# Patient Record
Sex: MALE | Race: WHITE | Employment: FULL TIME
[De-identification: names, ages, dates, MRNs, and addresses within clinical notes are randomized per-mention and may not be internally consistent; named-entity substitution may affect disease eponyms.]

---

## 2022-06-20 ENCOUNTER — NURSE TRIAGE (OUTPATIENT)
Dept: OTHER | Facility: CLINIC | Age: 57
End: 2022-06-20

## 2022-09-20 SDOH — HEALTH STABILITY: PHYSICAL HEALTH: ON AVERAGE, HOW MANY MINUTES DO YOU ENGAGE IN EXERCISE AT THIS LEVEL?: 60 MIN

## 2022-09-20 SDOH — HEALTH STABILITY: PHYSICAL HEALTH: ON AVERAGE, HOW MANY DAYS PER WEEK DO YOU ENGAGE IN MODERATE TO STRENUOUS EXERCISE (LIKE A BRISK WALK)?: 4 DAYS

## 2022-09-21 ENCOUNTER — OFFICE VISIT (OUTPATIENT)
Dept: FAMILY MEDICINE CLINIC | Age: 57
End: 2022-09-21
Payer: COMMERCIAL

## 2022-09-21 VITALS
RESPIRATION RATE: 16 BRPM | TEMPERATURE: 97.9 F | OXYGEN SATURATION: 99 % | HEIGHT: 68 IN | DIASTOLIC BLOOD PRESSURE: 88 MMHG | BODY MASS INDEX: 25.85 KG/M2 | WEIGHT: 170.6 LBS | HEART RATE: 63 BPM | SYSTOLIC BLOOD PRESSURE: 136 MMHG

## 2022-09-21 DIAGNOSIS — Z12.5 SCREENING PSA (PROSTATE SPECIFIC ANTIGEN): ICD-10-CM

## 2022-09-21 DIAGNOSIS — Z13.228 SCREENING FOR ENDOCRINE, METABOLIC AND IMMUNITY DISORDER: ICD-10-CM

## 2022-09-21 DIAGNOSIS — Z13.29 SCREENING FOR ENDOCRINE, METABOLIC AND IMMUNITY DISORDER: ICD-10-CM

## 2022-09-21 DIAGNOSIS — Z13.1 SCREENING FOR DIABETES MELLITUS: ICD-10-CM

## 2022-09-21 DIAGNOSIS — M54.50 ACUTE EXACERBATION OF CHRONIC LOW BACK PAIN: Primary | ICD-10-CM

## 2022-09-21 DIAGNOSIS — Z13.220 ENCOUNTER FOR SCREENING FOR LIPOID DISORDERS: ICD-10-CM

## 2022-09-21 DIAGNOSIS — Z13.0 SCREENING FOR ENDOCRINE, METABOLIC AND IMMUNITY DISORDER: ICD-10-CM

## 2022-09-21 DIAGNOSIS — Z71.82 EXERCISE COUNSELING: ICD-10-CM

## 2022-09-21 DIAGNOSIS — Z76.89 ESTABLISHING CARE WITH NEW DOCTOR, ENCOUNTER FOR: ICD-10-CM

## 2022-09-21 DIAGNOSIS — Z12.11 SCREEN FOR COLON CANCER: ICD-10-CM

## 2022-09-21 DIAGNOSIS — S39.012S STRAIN OF MUSCLE, FASCIA AND TENDON OF LOWER BACK, SEQUELA: ICD-10-CM

## 2022-09-21 DIAGNOSIS — Z71.3 DIETARY COUNSELING: ICD-10-CM

## 2022-09-21 DIAGNOSIS — M62.830 LUMBAR PARASPINAL MUSCLE SPASM: ICD-10-CM

## 2022-09-21 DIAGNOSIS — Z23 NEED FOR SHINGLES VACCINE: ICD-10-CM

## 2022-09-21 DIAGNOSIS — G89.29 ACUTE EXACERBATION OF CHRONIC LOW BACK PAIN: Primary | ICD-10-CM

## 2022-09-21 LAB
ALBUMIN SERPL-MCNC: 4.7 G/DL (ref 3.5–5.2)
ALP BLD-CCNC: 69 U/L (ref 40–129)
ALT SERPL-CCNC: 18 U/L (ref 0–40)
ANION GAP SERPL CALCULATED.3IONS-SCNC: 10 MMOL/L (ref 7–16)
AST SERPL-CCNC: 19 U/L (ref 0–39)
BASOPHILS ABSOLUTE: 0.06 E9/L (ref 0–0.2)
BASOPHILS RELATIVE PERCENT: 1.2 % (ref 0–2)
BILIRUB SERPL-MCNC: 0.5 MG/DL (ref 0–1.2)
BUN BLDV-MCNC: 15 MG/DL (ref 6–20)
CALCIUM SERPL-MCNC: 9.5 MG/DL (ref 8.6–10.2)
CHLORIDE BLD-SCNC: 104 MMOL/L (ref 98–107)
CHOLESTEROL, FASTING: 240 MG/DL (ref 0–199)
CO2: 26 MMOL/L (ref 22–29)
CREAT SERPL-MCNC: 1.1 MG/DL (ref 0.7–1.2)
EOSINOPHILS ABSOLUTE: 0.06 E9/L (ref 0.05–0.5)
EOSINOPHILS RELATIVE PERCENT: 1.2 % (ref 0–6)
GFR AFRICAN AMERICAN: >60
GFR NON-AFRICAN AMERICAN: >60 ML/MIN/1.73
GLUCOSE BLD-MCNC: 95 MG/DL (ref 74–99)
HBA1C MFR BLD: 5.3 % (ref 4–5.6)
HCT VFR BLD CALC: 42 % (ref 37–54)
HDLC SERPL-MCNC: 39 MG/DL
HEMOGLOBIN: 14 G/DL (ref 12.5–16.5)
IMMATURE GRANULOCYTES #: 0.02 E9/L
IMMATURE GRANULOCYTES %: 0.4 % (ref 0–5)
LDL CHOLESTEROL CALCULATED: 176 MG/DL (ref 0–99)
LYMPHOCYTES ABSOLUTE: 1.7 E9/L (ref 1.5–4)
LYMPHOCYTES RELATIVE PERCENT: 33.3 % (ref 20–42)
MCH RBC QN AUTO: 29.1 PG (ref 26–35)
MCHC RBC AUTO-ENTMCNC: 33.3 % (ref 32–34.5)
MCV RBC AUTO: 87.3 FL (ref 80–99.9)
MONOCYTES ABSOLUTE: 0.34 E9/L (ref 0.1–0.95)
MONOCYTES RELATIVE PERCENT: 6.7 % (ref 2–12)
NEUTROPHILS ABSOLUTE: 2.93 E9/L (ref 1.8–7.3)
NEUTROPHILS RELATIVE PERCENT: 57.2 % (ref 43–80)
PDW BLD-RTO: 12.8 FL (ref 11.5–15)
PLATELET # BLD: 190 E9/L (ref 130–450)
PMV BLD AUTO: 12.5 FL (ref 7–12)
POTASSIUM SERPL-SCNC: 4.2 MMOL/L (ref 3.5–5)
PROSTATE SPECIFIC ANTIGEN: 0.71 NG/ML (ref 0–4)
RBC # BLD: 4.81 E12/L (ref 3.8–5.8)
SODIUM BLD-SCNC: 140 MMOL/L (ref 132–146)
TOTAL PROTEIN: 6.9 G/DL (ref 6.4–8.3)
TRIGLYCERIDE, FASTING: 125 MG/DL (ref 0–149)
VLDLC SERPL CALC-MCNC: 25 MG/DL
WBC # BLD: 5.1 E9/L (ref 4.5–11.5)

## 2022-09-21 PROCEDURE — 99204 OFFICE O/P NEW MOD 45 MIN: CPT | Performed by: SURGERY

## 2022-09-21 PROCEDURE — 90715 TDAP VACCINE 7 YRS/> IM: CPT | Performed by: SURGERY

## 2022-09-21 PROCEDURE — 90471 IMMUNIZATION ADMIN: CPT | Performed by: SURGERY

## 2022-09-21 RX ORDER — ZOSTER VACCINE RECOMBINANT, ADJUVANTED 50 MCG/0.5
0.5 KIT INTRAMUSCULAR SEE ADMIN INSTRUCTIONS
Qty: 0.5 ML | Refills: 1 | Status: SHIPPED | OUTPATIENT
Start: 2022-09-21 | End: 2023-03-20

## 2022-09-21 RX ORDER — METHOCARBAMOL 500 MG/1
500 TABLET, FILM COATED ORAL 4 TIMES DAILY
Qty: 40 TABLET | Refills: 0 | Status: SHIPPED | OUTPATIENT
Start: 2022-09-21 | End: 2022-10-01

## 2022-09-21 RX ORDER — FINASTERIDE 1 MG/1
TABLET, FILM COATED ORAL
COMMUNITY
Start: 2022-07-29

## 2022-09-21 RX ORDER — IBUPROFEN 800 MG/1
800 TABLET ORAL EVERY 12 HOURS PRN
Qty: 90 TABLET | Refills: 0 | Status: SHIPPED | OUTPATIENT
Start: 2022-09-21

## 2022-09-21 RX ORDER — ACETAMINOPHEN 500 MG
500 TABLET ORAL 3 TIMES DAILY PRN
Qty: 180 TABLET | Refills: 0 | Status: SHIPPED | OUTPATIENT
Start: 2022-09-21

## 2022-09-21 SDOH — ECONOMIC STABILITY: FOOD INSECURITY: WITHIN THE PAST 12 MONTHS, THE FOOD YOU BOUGHT JUST DIDN'T LAST AND YOU DIDN'T HAVE MONEY TO GET MORE.: NEVER TRUE

## 2022-09-21 SDOH — ECONOMIC STABILITY: FOOD INSECURITY: WITHIN THE PAST 12 MONTHS, YOU WORRIED THAT YOUR FOOD WOULD RUN OUT BEFORE YOU GOT MONEY TO BUY MORE.: NEVER TRUE

## 2022-09-21 ASSESSMENT — PATIENT HEALTH QUESTIONNAIRE - PHQ9
SUM OF ALL RESPONSES TO PHQ QUESTIONS 1-9: 0
SUM OF ALL RESPONSES TO PHQ9 QUESTIONS 1 & 2: 0
SUM OF ALL RESPONSES TO PHQ QUESTIONS 1-9: 0
1. LITTLE INTEREST OR PLEASURE IN DOING THINGS: 0
SUM OF ALL RESPONSES TO PHQ QUESTIONS 1-9: 0
2. FEELING DOWN, DEPRESSED OR HOPELESS: 0
SUM OF ALL RESPONSES TO PHQ QUESTIONS 1-9: 0

## 2022-09-21 ASSESSMENT — SOCIAL DETERMINANTS OF HEALTH (SDOH): HOW HARD IS IT FOR YOU TO PAY FOR THE VERY BASICS LIKE FOOD, HOUSING, MEDICAL CARE, AND HEATING?: NOT HARD AT ALL

## 2022-09-21 NOTE — PATIENT INSTRUCTIONS
-Carbohydrates limit to 40 to 50g per meal (120g to 150g per day)  -Fats limit to 60g per day (total fat intake should be 30% to 35% of your total daily calories, so restrict to whichever number is lower)   -Of the fats you do eat, never eat trans fats, never eat unsaturated fats, limit your saturated fat intake to less than 20g per day (or 7% of your total daily calories, so restrict to whichever number is lower)  -Cholesterol limit to no more than 300mg per day (200mg per day if you have elevated triglycerides or total cholesterol)  -Sodium less than 2000mg per day    Up My GamePal or similar application (or track by journal) to monitor calories and macronutrients. This is the most critical component to a heart healthy, balanced diet: honesty, keeping oneself accountable, ensure you are tracking daily goals and meeting them. Food is medicine! Counseled the patient on importance of cardiovascular and resistance training. Make every attempt to engage in a level of activity, sustained for at least 30 minutes, where you saturate your undergarments with sweat. This should be done, at a minimum, three times per week. Target HR 130bpm 15 min straight 5 times per week at a minimum.

## 2022-09-21 NOTE — PROGRESS NOTES
Nikkie Mares (:  1965) is a 64 y.o. male,Established patient, here for evaluation of the following chief complaint(s):  Establish Care (Former  patient) and Health Maintenance (Due for HIV Screen, Hep C, Tdap, Colonoscopy, Lipids, Shingles, Covid Vaccine, Flu Vaccine)         ASSESSMENT/PLAN:  1. Acute exacerbation of chronic low back pain  -     [facility] - Physical Therapy, [address] (2-3 VIEWS); Future  -     methocarbamol (ROBAXIN) 500 MG tablet; Take 1 tablet by mouth 4 times daily for 10 days, Disp-40 tablet, R-0Normal  -     acetaminophen (TYLENOL) 500 MG tablet; Take 1 tablet by mouth 3 times daily as needed for Pain, Disp-180 tablet, R-0Normal  -     ibuprofen (ADVIL;MOTRIN) 800 MG tablet; Take 1 tablet by mouth every 12 hours as needed for Pain (pain, must be taken with meal), Disp-90 tablet, R-0Normal    OLD XR     Five nonrib-bearing lumbar vertebral bodies are present. Vertebral   bodies are anatomically aligned. Mild probable remote vertebral body   height loss involves T12 and L1. Slight hypertrophy of the posterior   osseous elements involves L5-S1.     2. Strain of muscle, fascia and tendon of lower back, sequela  -     [facility] - Physical Therapy, [address]  -     methocarbamol (ROBAXIN) 500 MG tablet; Take 1 tablet by mouth 4 times daily for 10 days, Disp-40 tablet, R-0Normal  -     acetaminophen (TYLENOL) 500 MG tablet; Take 1 tablet by mouth 3 times daily as needed for Pain, Disp-180 tablet, R-0Normal  -     ibuprofen (ADVIL;MOTRIN) 800 MG tablet; Take 1 tablet by mouth every 12 hours as needed for Pain (pain, must be taken with meal), Disp-90 tablet, R-0Normal  3. Lumbar paraspinal muscle spasm  -     [facility] - Physical Therapy, [address]  -     methocarbamol (ROBAXIN) 500 MG tablet; Take 1 tablet by mouth 4 times daily for 10 days, Disp-40 tablet, R-0Normal  -     acetaminophen (TYLENOL) 500 MG tablet;  Take 1 tablet by mouth 3 times daily as needed for Pain, Disp-180 tablet, R-0Normal  -     ibuprofen (ADVIL;MOTRIN) 800 MG tablet; Take 1 tablet by mouth every 12 hours as needed for Pain (pain, must be taken with meal), Disp-90 tablet, R-0Normal  4. Encounter for screening for lipoid disorders  -     Lipid, Fasting; Future  5. Screening PSA (prostate specific antigen)  -     PSA Screening; Future  - Shared decision making. GRACY is less effective than the PSA blood test in finding prostate cancer, but it can sometimes find cancers in men with normal PSA levels. Patient is understanding of this and risks versus benefits. GRACY deferred. 6. Screening for endocrine, metabolic and immunity disorder  -     CBC with Auto Differential; Future  -     Comprehensive Metabolic Panel; Future  7. Need for shingles vaccine  -     zoster recombinant adjuvanted vaccine [facility]) 50 MCG/0.5ML SUSR injection; Inject 0.5 mLs into the muscle See Admin Instructions 1 dose now and repeat in 2-6 months, Disp-0.5 mL, R-1Print  8. Screen for colon cancer  -     Fecal DNA Colorectal cancer screening (Cologuard)  9. Screening for diabetes mellitus  -     Hemoglobin A1C; Future  10. Dietary counseling  Counseled the patient on diet, continue to make positive choices. It is important to focus on meeting food group needs with nutrient dense foods and stay within caloric limits. Nutrient dense foods will provide you with vitamins, minerals, and other health promoting nutrients. You should avoid added sugars, saturated fats, hydrogenated oils, and limit sodium intake (this isn't just table salt. .. turn the package over, read the label, stay under 2000 mg or 2g of total sodium daily). Track your calories, this will help you get an understanding of what a proper portion size is.       Every day you should eat these:  -Veggies of all types  -Fruits  -Grains (strive for at least half of these to be whole-grain)  -Lean protein (poultry, fish, legumes, nuts)    Stick to the plate diet.    -01% of your dinner plate should be leafy green vegetables, dark green, red and orange vegetables. Do not use high-calorie dressing is a ranch or dressings that are filled with added sugars. 25% of your dinner plate should be whole grains. The remaining 25% of your dinner plate should be a lean protein. Limit your red meat intake to once per week and no more than 4 ounces in any serving.  -No need for second helpings. Limit or avoid these foods:  -Added sugars (less than 10% of your total calories in any given day should be from sugars)  -Saturated fats and hydrogenated oils (less than 10% of your total calories in any given day should be from these)  -Sodium (less than 2000 mg/day)  -Alcoholic beverages (even in moderation, alcohol can cause long-term health issues involving hypertension, electrolyte abnormalities, liver disease and even cancers of the mouth and throat)    Stay hydrated. Unless instructed otherwise by your physician, you should strive to drink at least eight 8 ounce glasses of water daily, some of these being fortified with electrolytes (such as a Pedialyte, or low calorie sports drink). Again, avoid added sugars. 11. Exercise counseling  Counseled the patient on importance of cardiovascular and resistance training. Make every attempt to engage in a level of activity, sustained for at least 30 minutes, where you saturate your undergarments with sweat. This should be done, at a minimum, three times per week. 12. Establishing care with new doctor, encounter for  All personal, family, social, surgical, medical history is reviewed and updated with patient. Allergies, medications updated. List of specialists follows with updated. DM/HM updated. Come back sooner if no improvement. Scheduled multimodal analgesia.    Return in about 1 year (around 9/21/2023) for annual exam .         Subjective   SUBJECTIVE/OBJECTIVE:  HPI:    Engineering Manager  (Electrical)      Androgenic Alopecia:  -finasteride  -no prostate symptoms      Tinnitus  -recently has been taking ibuprofen qod  -no associated dizziness, hearing loss   -Left ear  -like a ringing bell 2 to [facility]      Back pain:  -broke back (4 vertebrae) in 2013 snowboarding  -5 - 6 years later was putting air in tire and had severe back pain  -about once every two years the same pain occurs  -lifting heavy furniture, bent over, out of best lifting practices and felt like something gave  (Original injury in March, 9/10 pain at worst)    -worse with sitting/standing antigravity activity  -severity now 6/10 at worst, 3/10 on average  -has trouble with defecation at times due to the pain         Preventative:  Health Maintenance   Topic Date Due    Depression Screen  Never done    Lipids  Never done    Colorectal Cancer Screen  Never done    Shingles vaccine (1 of 2) Never done    COVID-19 Vaccine (4 - Booster for [facility] series) 04/27/2022    Flu vaccine (1) 09/21/2023 (Originally 9/1/2022)    Diabetes screen  09/21/2025    DTaP/Tdap/Td vaccine (2 - Td or Tdap) 09/21/2032    Hepatitis A vaccine  Aged Out    Hepatitis B vaccine  Aged Out    Hib vaccine  Aged Out    Meningococcal (ACWY) vaccine  Aged Out    Pneumococcal 0-64 years Vaccine  Aged Out    Hepatitis C screen  Discontinued    HIV screen  Discontinued           ROS:    Denies 10pt ROS other than noted in HPI. Objective       PHYSICAL EXAM:    /88   Pulse 63   Temp 97.9 °F (36.6 °C) (Temporal)   Resp 16   Ht 5' 8\" (1.727 m)   Wt 170 lb 9.6 oz (77.4 kg)   SpO2 99%   BMI 25.94 kg/m²     AVSS    GA: Well-groomed, appears well, no acute distress. HEENT: Atraumatic normocephalic. Extraocular muscles are grossly intact. Pupils are equal round reactive to light. Conjunctiva pink and moist.  Hearing is grossly intact. Nares patent without external drainage.   Buccal mucosa pink and moist.  Posterior oropharynx clear without lesion or exudate. NECK: Trachea is midline, supple, nontender, no lymphadenopathy. CARDIO: Regular rate and rhythm without murmur rub or gallop. Cap refill 2+. Radial pulses 2+ bilaterally. RESPIRATORY: Clear to auscultation bilaterally without wheezes rales or rhonchi. Normal inspiratory and expiratory effort. Normoxic on room air    ABD: Rounded, normoactive bowel sounds. Soft, nontender, no organomegaly. MSK: Structurally appropriate for age. Left paravertebral musculature is ropey, fibrotic, boggy, TART. Quadratus lumborum is spasmed and sidebending away is limited to pain. Otherwise AROM is normal at thoracolumbar spine. Straight leg well leg normal.  No midline tenderness. Neurovascular intact in bilateral lower extremities. NEURO: Alert, no gross deficit. PSYCH:  Mood is normal and congruent with affect. No signs of psychomotor retardation or agitation. Thought content seems normal, speech is fluent and non-pressured. SKIN: Generally warm pink and dry. An electronic signature was used to authenticate this note.     --Emmanuel Lights, DO

## 2022-09-22 ENCOUNTER — TELEPHONE (OUTPATIENT)
Dept: FAMILY MEDICINE CLINIC | Age: 57
End: 2022-09-22

## 2022-09-22 NOTE — TELEPHONE ENCOUNTER
----- Message from Anselmo Bennett DO sent at 9/21/2022  6:32 PM EDT -----  We need a 15 min talk on  lipid panel and options moving forward. VV is preferred    _____________________________________      Called patient to schedule appointment to discuss lab results. No answer, left voicemail for patient to call back and schedule.

## 2022-09-26 ENCOUNTER — TELEMEDICINE (OUTPATIENT)
Dept: FAMILY MEDICINE CLINIC | Age: 57
End: 2022-09-26
Payer: COMMERCIAL

## 2022-09-26 DIAGNOSIS — M47.9 SPONDYLOSIS: ICD-10-CM

## 2022-09-26 DIAGNOSIS — M47.819 FACET ARTHROPATHY: ICD-10-CM

## 2022-09-26 DIAGNOSIS — E78.00 PURE HYPERCHOLESTEROLEMIA: Primary | ICD-10-CM

## 2022-09-26 DIAGNOSIS — M47.819 FACET SCLEROSIS: ICD-10-CM

## 2022-09-26 PROCEDURE — 99214 OFFICE O/P EST MOD 30 MIN: CPT | Performed by: SURGERY

## 2022-09-26 RX ORDER — ATORVASTATIN CALCIUM 10 MG/1
10 TABLET, FILM COATED ORAL DAILY
Qty: 30 TABLET | Refills: 3 | Status: SHIPPED | OUTPATIENT
Start: 2022-09-26

## 2022-09-26 RX ORDER — VITAMIN B COMPLEX
100 TABLET ORAL DAILY
Qty: 30 CAPSULE | Refills: 3 | Status: SHIPPED | OUTPATIENT
Start: 2022-09-26

## 2022-09-26 NOTE — PATIENT INSTRUCTIONS
-Carbohydrates limit to 40 to 50g per meal (120g to 150g per day)  -Fats limit to 60g per day (total fat intake should be 30% to 35% of your total daily calories, so restrict to whichever number is lower)   -Of the fats you do eat, never eat trans fats, never eat unsaturated fats, limit your saturated fat intake to less than 20g per day (or 7% of your total daily calories, so restrict to whichever number is lower)  -Cholesterol limit to no more than 300mg per day (200mg per day if you have elevated triglycerides or total cholesterol)  -Sodium less than 2000mg per day    NewstagPal or similar application (or track by journal) to monitor calories and macronutrients. This is the most critical component to a heart healthy, balanced diet: honesty, keeping oneself accountable, ensure you are tracking daily goals and meeting them. Food is medicine! Counseled the patient on importance of cardiovascular and resistance training. Make every attempt to engage in a level of activity, sustained for at least 30 minutes, where you saturate your undergarments with sweat. This should be done, at a minimum, three times per week. Target HR 130bpm sustained for 15 min straight 5 times per week at a minimum (ideally 110bpm to 130bpm sustained 30 min straight 5 times per week). ________________________________________________________________________________    With arthritic findings on back xray, these are likely related to prior injury. Movement is important, get back into the gym for routine exercise and stretching program (Core strengthening exercises (eg, abdominals) and low back stretching are key).   Recommend daily NSAID such as Mobic to help with controlling pain but also as a prophylactic medication that can help slow the progression of the arthritic changes (of course, this is related to the progression of arthritis due to disuse/not moving, so if you ever find yourself avoiding activity due to pain, NSAIDs like Mobic can help keep you mobile, which will help prevent the arthritis from getting worse, Tylenol can work in this fashion as well). There is also an entire specialty dedicated to pain management in individuals with findings on their back xray like you. If efforts to control pain without prescriptive therapy are not working, call back and we can refer you to a specialist that may be able to help with targeted injections, physical therapy to help with pain rather than pills to take.

## 2022-09-26 NOTE — PROGRESS NOTES
Claude Haas (:  1965) is a Established patient, here for evaluation of the following:    Assessment & Plan   Below is the assessment and plan developed based on review of pertinent history, physical exam, labs, studies, and medications. 1. Pure hypercholesterolemia  -     atorvastatin (LIPITOR) 10 MG tablet; Take 1 tablet by mouth daily, Disp-30 tablet, R-3Normal  -     Coenzyme Q10 (COQ10) 100 MG CAPS; Take 100 mg by mouth daily, Disp-30 capsule, R-3Normal  2. Facet arthropathy  3. Facet sclerosis  4. Spondylosis    XR reviewed with patient, arthritic changes likely secondary to prior fracture in this area. The facet sclerosis and hypertrophy could potentially be debilitating in some patients. Lengthy discussion about role of NSAIDs and Tylenol, not that they slow disease progression themselves, but they keep the patient moving (which can slow progression). He will continue with prn OTC Tylenol for now. May consider 200mg Ibuprofen or 250mg naproxen OTC prn with meals as well. Encouraged to reach back out if there is daily or qod use of these as there may be more robust control through Mobic or similar. Pain management is also not unreasonable as facet syndrome is a possibility here and could benefit from interventional pain. Exercise program prescribed. With respect to cholesterol, the patient understands the ASCVD risk calculation and my desire to start a statin drug. We had lengthy risk and benefit discussion of the drugs and he elected to start moderate intensity statin therapy along with preventative COQ10. He will strictly adhere to the dietary recommendations given. At the one year interval we will reassess LDL reduction (>= 30%) and possibly consider removal of statin drug at that time and if patient continues aggressive lifestyle optimization. All other labs reviewed with patient. All questions answered. Follow up for annual exam with labs or sooner if needed.   No follow-ups on file. Subjective   HPI  Review of Systems         HLD:  8.9%  Intermediate  Current 10-Year  ASCVD Risk**  Lifetime ASCVD Risk:    50%Optimal ASCVD Risk:    4.3%      Lumbar pain:  XR: Mild to moderate osteo-degenerative changes are noted, with anterior and lateral spondylitic ridging present throughout the lumbar spine. Multilevel bilateral facet hypertrophy and sclerosis is identified. Improved since last visit   Starting PT 10/3      Objective   Patient-Reported Vitals  No data recorded     Physical Exam         On this date 9/26/2022 I have spent 36 minutes reviewing previous notes, test results and face to face (virtual) with the patient discussing the diagnosis and importance of compliance with the treatment plan as well as documenting on the day of the visit. Tom Alma was evaluated through a synchronous (real-time) audio-video encounter. The patient (or guardian if applicable) is aware that this is a billable service, which includes applicable co-pays. This Virtual Visit was conducted with patient's (and/or legal guardian's) consent. The visit was conducted pursuant to the emergency declaration under the [facility], [address] [place] [address] [facility] authority and the [employer] General Act. Patient identification was verified, and a caregiver was present when appropriate. The patient was located at Home: [facility] [place] [address].    [facility] [address] [place] [address]

## 2022-09-30 ENCOUNTER — TELEPHONE (OUTPATIENT)
Dept: PHYSICAL THERAPY | Age: 57
End: 2022-09-30

## 2022-10-03 ENCOUNTER — EVALUATION (OUTPATIENT)
Dept: PHYSICAL THERAPY | Age: 57
End: 2022-10-03
Payer: COMMERCIAL

## 2022-10-03 DIAGNOSIS — M54.50 ACUTE EXACERBATION OF CHRONIC LOW BACK PAIN: Primary | ICD-10-CM

## 2022-10-03 DIAGNOSIS — M62.830 LUMBAR PARASPINAL MUSCLE SPASM: ICD-10-CM

## 2022-10-03 DIAGNOSIS — S39.012S STRAIN OF MUSCLE, FASCIA AND TENDON OF LOWER BACK, SEQUELA: ICD-10-CM

## 2022-10-03 DIAGNOSIS — G89.29 ACUTE EXACERBATION OF CHRONIC LOW BACK PAIN: Primary | ICD-10-CM

## 2022-10-03 PROCEDURE — 97163 PT EVAL HIGH COMPLEX 45 MIN: CPT | Performed by: PHYSICAL THERAPIST

## 2022-10-03 NOTE — PROGRESS NOTES
[facility] OUTPATIENT REHABILITATION  PHYSICAL THERAPY INITIAL EVALUATION         Date:  10/3/2022   Patient: Ector Davis  : 1965  MRN: [id number]  Referring Provider: Xander Lerner DO  [address] [place] [address]     Medical Diagnosis:      Diagnosis Orders   1. Acute exacerbation of chronic low back pain        2. Strain of muscle, fascia and tendon of lower back, sequela        3. Lumbar paraspinal muscle spasm            Physician Order: Eval and Treat     SUBJECTIVE:     Onset date:     Onset: Sudden     History / Mechanism of Injury: Patient reports waxing and waning back pain problems since injuring his back in . At that time, he fell backwards onto his back while snowboarding and fractured 4 vertebrae. He recovered fine, but 5 years later pain began and persisted for some time. He went to PT and his pain went away. He reports he was doing well until March of this year when he aggravated his back pain when lifting furniture. After that, he continued to aggravate his back with lifting, awkward postures, getting off of low surfaces (toilet). Overall, he has waxing and waning back pain since his original injury. Imaging notes OA as noted below. Chief complaint: pain, decreased mobility, limited management of problem    Pain:   Pain 3-5/10    Symptom Type / Quality: aching  Location[de-identified] Back:  L low back    LB/LE Ratio: does not radiate        Provoking Activities/Positions:  rising to standing (eases after 3-4 minutes), sit ups, recumbent bike (seat position)                 Relieving Activitie/Positions:  sitting, lying down, standing       Imaging results: XR LUMBAR SPINE (2-3 VIEWS)    Result Date: 2022  EXAMINATION: THREE XRAY VIEWS OF THE LUMBAR SPINE 2022 11:38 am COMPARISON: The previous study performed 2013.  HISTORY: ORDERING SYSTEM PROVIDED HISTORY: Acute exacerbation of chronic low back pain FINDINGS: There is preservation of the normal lordotic curve. A mild lumbar scoliosis is seen, with convexity to the left. There is no evidence of fracture or subluxation. Mild to moderate osteo-degenerative changes are noted, with anterior and lateral spondylitic ridging present throughout the lumbar spine. Multilevel bilateral facet hypertrophy and sclerosis is identified. No other significant osseous abnormality is seen. The intervertebral disc spaces are preserved in height. No significant surrounding soft tissue abnormality is seen. No fracture or subluxation. Mild lumbar scoliosis, with convexity to the left. Mild-to-moderate osteo-degenerative changes, as described above. Past Medical History:  No past medical history on file. No past surgical history on file. Medications:   Current Outpatient Medications   Medication Sig Dispense Refill    atorvastatin (LIPITOR) 10 MG tablet Take 1 tablet by mouth daily 30 tablet 3    Coenzyme Q10 (COQ10) 100 MG CAPS Take 100 mg by mouth daily 30 capsule 3    finasteride (PROPECIA) 1 MG tablet TAKE 1 TABLET BY MOUTH ONCE DAILY      acetaminophen (TYLENOL) 500 MG tablet Take 1 tablet by mouth 3 times daily as needed for Pain 180 tablet 0    ibuprofen (ADVIL;MOTRIN) 800 MG tablet Take 1 tablet by mouth every 12 hours as needed for Pain (pain, must be taken with meal) 90 tablet 0    zoster recombinant adjuvanted vaccine (SHINGRIX) 50 MCG/0.5ML SUSR injection Inject 0.5 mLs into the muscle See Admin Instructions 1 dose now and repeat in 2-6 months 0.5 mL 1     No current facility-administered medications for this visit. Occupation: . Both sit and standing desk. Exercise regimen: Push-ups. Has dumbbells at home (up to 30 lbs); not currently using. Also likes to walk -- 4 times a week from 1.4 to 4 miles.       Hobbies: yard work, working around [employer], DIY projects    Patient Goals: pain relief, learn how to manage condition Contraindications/Precautions: none    OBJECTIVE:     Estimated body mass index is 25.94 kg/m² as calculated from the following:    Height as of 9/21/22: 5' 8\" (1.727 m). Weight as of 9/21/22: 170 lb 9.6 oz (77.4 kg). Observations: well nourished male, normal affect     Inspection: normal orthopedic exam         Gait: normal    Functional Strength:   Able to toe walk, heel walk, and squat. Range of Motion:    Trunk:    Flexion:  [] Normal   [x] Limited    Extension:  [] Normal   [x] Limited     Right Rotation: [] Normal   [x] Limited    Left Rotation:  [] Normal   [x] Limited    Right Side Bending: [] Normal   [x] Limited    Left Side Bending: [] Normal   [x] Limited     ROM limited 10-15% in all directions due to general stiffness. Lower Extremity:   Right:   [x] Normal   [] Limited    Left:   [x] Normal   [] Limited     Strength:     Trunk: 4/5   R LE: 5/5   L LE: 5/5    Palpation: Tender to palpation left lateral raphe., Non-tender to palpation axial spine, right lumbar paraspinal muscles and soft tissues, left lumbar paraspinal muscles and soft tissues. Sensation: intact to light touch and temperature. Special Tests:   [x] Nerve Root Compression           Right []+ / [x] -    Left []+ / [x] -  [] Slump           Right []+ / [] -    Left []+ / [] -  [] FADIR          Right []+ / [] -    Left []+ / [] -  [] S-I Distraction          Right []+ / [] -    Left []+ / [] -     [] SLR           Right []+ / [] -    Left []+ / [] -     [] RAGHU          Right []+ / [] -    Left []+ / [] -  [] S-I Compression          Right []+ / [] -    Left []+ / [] -   [] Other: []+ / [] -         ASSESSMENT     Brandon Barney has waxing and waning L back pain over the area of convexity of a mild scoliosis. Muscles soreness / reaction to this change may be contributory. He has good ROM, however movements are stiff. Treatment will begin with ROM and light stretching.   This will be followed up with a strengthening regimen to prevent recurrence. Education will be provided along the way. Problems:   Pain 5/10 waxing / waning  ROM decreased  Strength decreased   Limitations with bending, reaching, lifting, carrying      [x] There are no barriers affecting plan of care or recovery    [] Barriers to this patient's plan of care or recovery include:      Domestic Concerns:  [x] No  [] Yes:    Short Term goals (2 weeks)  Pain 2-3/10    Long Term goals (4-6 weeks)  Pain 0-1/10  ROM WFL  Strength WFL  Able to perform / complete the following functions / tasks: reach / lift / carry medium weighted items in performance of home or work demands  Independent with home exercise program (HEP)    Rehab Potential: [x] Good  [] Fair  [] Poor    PLAN       Treatment Plan:   instruction in home exercise program   therapeutic exercise   therapeutic activity   neuromuscular re-education     The following CPT codes are likely to be used in the care of this patient:   41825 PT Evaluation: High Complexity   87272 PT Re-Evaluation   49113 Therapeutic Exercise   74433 Neuromuscular Re-Education   52213 Therapeutic Activities     Suggested Professional Referral: [x] No  [] Yes:     Patient Education:  [x] Plans / Goals, Risks / Benefits discussed  [x] Home exercise program  Method of Education: [x] Verbal  [x] Demo  [x] Written  Comprehension of Education:  [x] Verbalizes understanding. [x] Demonstrates understanding. [] Needs Review. [] Demonstrates / verbalizes understanding of HEP / She Alvine previously given. Frequency:  1 day per week for 4-6 weeks    Patient understands diagnosis/prognosis and consents to treatment, plan and goals: [x] Yes    [] No     Thank you for the opportunity to work with your patient. If you have questions or comments, please contact me at [phone]; fax: [phone]. Electronically signed by: Radha Ya PT         Please sign Physician's Certification and return to:   [address] [place] [address]  Dept: [phone]  Dept Fax: [phone]: [phone] Certification / Comments     Frequency/Duration 1 day per week for 4-6 weeks. Certification period from 10/3/2022  to 1/3/2023. I have reviewed the Plan of Care established for skilled therapy services and certify that the services are required and that they will be provided while the patient is under my care.     Physician's Comments/Revisions:               Physician's Printed Name:                                           [de-identified] Signature:                                                               Date:

## 2022-10-04 NOTE — PROGRESS NOTES
Physical Therapy Treatment Note    Date: 10/4/2022  Patient Name: Ector Davis  : 1965   MRN: [id number]  DOInjury: 3/2022  DOSx: --  Referring Provider: Xander Lerner DO  [address] [place] [address]     Medical Diagnosis:      Diagnosis Orders   1. Acute exacerbation of chronic low back pain        2. Strain of muscle, fascia and tendon of lower back, sequela        3. Lumbar paraspinal muscle spasm            Cem Stallworth has waxing and waning L back pain over the area of convexity of a mild scoliosis. Muscles soreness / reaction to this change may be contributory. He has good ROM, however movements are stiff. Treatment will begin with ROM and light stretching. This will be followed up with a strengthening regimen to prevent recurrence. Education will be provided along the way. Access Code: [email or web address]  URL: [email or web address]  Date: 10/03/2022  Prepared by: Wilfredo Ramirez    Exercises  Seated Lumbar Flexion Stretch - 3-5 x daily - 10 reps    X = TO BE PERFORMED NEXT VISIT  > = PROGRESS TO THIS    S: See eval  O:   Time 6176-3365     Visit /-6  ~1/wk     Pain Pain 3-5/10     ROM Dec 10-15% (stiffness)     Modalities Use sparingly if at all. Prefer an active program.     MH + ES   MO      MO         Manual         MT   ROM      Hooklying PPT   NR   SKTC   TE   DKTC   TE   Seated flexion fwd, to L, to R All x 10  TE   Standing Trunk Extension    TE         Exercise      PPT Progression (supine > sitting > standing against wall > standing) ? ? NR   Crunches with PPT May explore. He gets pain with sit-ups. NR   Mat marches with PPT   NR                     ROWS: H X  TE   ROWS: M  Reach and Pull X  TE   ROWS: L   Reach and Pull X  TE   Tubing Pushes   TE   Marching   TA   Squats   TE   [place] deadlift 2-leg >  TE   Alternating dumbbell shoulder press  >  TE                     A:  Tolerated well.   Discussed anatomy, physiology, body mechanics, principles of loading, and progressive loading/activity. Reviewed home exercise program extensively; written copy provided.    P: Continue with rehab plan  Ananda Foy, PT    Treatment Charges: Mins Units   Initial Evaluation 40 1   Re-Evaluation     Ther Exercise         TE     Manual Therapy     MT     Ther Activities        TA     Gait Training          GT     Neuro Re-education NR     Modalities     Non-Billable Service Time     Other     Total Time/Units 40 1

## 2022-10-10 ENCOUNTER — TREATMENT (OUTPATIENT)
Dept: PHYSICAL THERAPY | Age: 57
End: 2022-10-10
Payer: COMMERCIAL

## 2022-10-10 DIAGNOSIS — S39.012S STRAIN OF MUSCLE, FASCIA AND TENDON OF LOWER BACK, SEQUELA: ICD-10-CM

## 2022-10-10 DIAGNOSIS — M54.50 ACUTE EXACERBATION OF CHRONIC LOW BACK PAIN: Primary | ICD-10-CM

## 2022-10-10 DIAGNOSIS — G89.29 ACUTE EXACERBATION OF CHRONIC LOW BACK PAIN: Primary | ICD-10-CM

## 2022-10-10 DIAGNOSIS — M62.830 LUMBAR PARASPINAL MUSCLE SPASM: ICD-10-CM

## 2022-10-10 PROCEDURE — 97110 THERAPEUTIC EXERCISES: CPT | Performed by: PHYSICAL THERAPIST

## 2022-10-10 NOTE — PROGRESS NOTES
Physical Therapy Treatment Note    Date: 10/10/2022  Patient Name: Quinn Dacosta  : 1965   MRN: [id number]  DOInjury: 3/2022  DOSx: --  Referring Provider: Alycia Bah DO  [address] [place] [address]     Medical Diagnosis:      Diagnosis Orders   1. Acute exacerbation of chronic low back pain        2. Strain of muscle, fascia and tendon of lower back, sequela        3. Lumbar paraspinal muscle spasm            Radha Jin has waxing and waning L back pain over the area of convexity of a mild scoliosis. Muscles soreness / reaction to this change may be contributory. He has good ROM, however movements are stiff. Treatment will begin with ROM and light stretching. This will be followed up with a strengthening regimen to prevent recurrence. Education will be provided along the way. Access Code: [email or web address]  URL: [email or web address]  Date: 10/10/2022  Prepared by: Gabriel Packer    Exercises  Seated Lumbar Flexion Stretch - 3-5 x daily - 10 reps  Supine Posterior Pelvic Tilt - 2 x daily - 20 reps  Seated Pelvic Tilt - 2 x daily - 20 reps  Standing with Back Flat Against Wall - 2 x daily - 20 reps  Standing Posterior Pelvic Tilt - 2 x daily - 20 reps  High Row - 2-3 x weekly - 3 sets - 15 reps  Split Stance Shoulder Row with Resistance - 2-3 x weekly - 3 sets - 15 reps  Standing Floor Level Row - 3 x weekly - 3 sets - 15 reps      X = TO BE PERFORMED NEXT VISIT  > = PROGRESS TO THIS    S: Feeling better. Has been active in the yard and feeling good with it. O:   Time [id number]     Visit 2/4-6  ~1/wk     Pain Pain 3-5/10     ROM Dec 10-15% (stiffness)     Modalities Use sparingly if at all.   Prefer an active program.     MH + ES   MO      MO         Manual         MT   ROM      Hooklying PPT   NR   SKTC   TE   DKTC   TE   Seated flexion fwd, to L, to R All x 10  TE   Standing Trunk Extension    TE         Exercise      PPT Progression (supine > sitting > standing against wall > standing) 2 x 20 all 4 phases   NR   Crunches with PPT 2 x 10 subthreshold (not full motion)  NR   Mat marches with PPT   NR                     ROWS: H Black 3 x 12  TE   ROWS: M  Reach and Pull Black 3 x 12  TE   ROWS: L   Reach and Pull Black 3 x 12  TE   Tubing Pushes   TE   Marching   TA   Squats X? TE   [place] deadlift 2-leg X? TE   Alternating dumbbell shoulder press  X? TE                     A:  Tolerated well. Written HEP updated.    P: Continue with rehab plan  Sheela Fernando PT    Treatment Charges: Mins Units   Initial Evaluation     Re-Evaluation     Ther Exercise         TE 40 3   Manual Therapy     MT     Ther Activities        TA     Gait Training          GT     Neuro Re-education NR     Modalities     Non-Billable Service Time     Other     Total Time/Units 40 3

## 2022-10-12 LAB — NONINV COLON CA DNA+OCC BLD SCRN STL QL: NEGATIVE

## 2022-11-02 ENCOUNTER — TREATMENT (OUTPATIENT)
Dept: PHYSICAL THERAPY | Age: 57
End: 2022-11-02
Payer: COMMERCIAL

## 2022-11-02 DIAGNOSIS — G89.29 ACUTE EXACERBATION OF CHRONIC LOW BACK PAIN: Primary | ICD-10-CM

## 2022-11-02 DIAGNOSIS — S39.012S STRAIN OF MUSCLE, FASCIA AND TENDON OF LOWER BACK, SEQUELA: ICD-10-CM

## 2022-11-02 DIAGNOSIS — M54.50 ACUTE EXACERBATION OF CHRONIC LOW BACK PAIN: Primary | ICD-10-CM

## 2022-11-02 DIAGNOSIS — M62.830 LUMBAR PARASPINAL MUSCLE SPASM: ICD-10-CM

## 2022-11-02 PROCEDURE — 97110 THERAPEUTIC EXERCISES: CPT | Performed by: PHYSICAL THERAPIST

## 2022-11-02 NOTE — PROGRESS NOTES
Progression (supine > sitting > standing against wall > standing) 2 x 20 all 4 phases   NR   Crunches with PPT 2 x 10   NR   Lower ab 6\" lift 2 x 10  NR                     ROWS: H Gray 3 x 12 [place] tubing given 11/2/22 TE   ROWS: M  Reach and Pull [place] 3 x 12  TE   ROWS: L   Reach and Pull [place] 3 x 12  TE   Tubing Pushes   TE   Marching   TA   Squats 0 lbs 3 sets  TE   [place] deadlift 2-leg 0 lbs 3 sets  TE      TE                     A:  Tolerated well. Written HEP updated. P: Patient will continue with home exercises. He is to call with questions / concerns.    Kari Agudelo, PT    Treatment Charges: Mins Units   Initial Evaluation     Re-Evaluation     Ther Exercise         TE 40 3   Manual Therapy     MT     Ther Activities        TA     Gait Training          GT     Neuro Re-education NR     Modalities     Non-Billable Service Time     Other     Total Time/Units 40 3

## 2022-11-28 DIAGNOSIS — E78.00 PURE HYPERCHOLESTEROLEMIA: ICD-10-CM

## 2022-11-28 RX ORDER — ATORVASTATIN CALCIUM 10 MG/1
10 TABLET, FILM COATED ORAL DAILY
Qty: 30 TABLET | Refills: 5 | Status: SHIPPED | OUTPATIENT
Start: 2022-11-28

## 2022-11-28 RX ORDER — VITAMIN B COMPLEX
100 TABLET ORAL DAILY
Qty: 30 CAPSULE | Refills: 5 | Status: SHIPPED | OUTPATIENT
Start: 2022-11-28

## 2023-10-25 ASSESSMENT — PATIENT HEALTH QUESTIONNAIRE - PHQ9
2. FEELING DOWN, DEPRESSED OR HOPELESS: 0
SUM OF ALL RESPONSES TO PHQ QUESTIONS 1-9: 0
2. FEELING DOWN, DEPRESSED OR HOPELESS: NOT AT ALL
1. LITTLE INTEREST OR PLEASURE IN DOING THINGS: NOT AT ALL
1. LITTLE INTEREST OR PLEASURE IN DOING THINGS: 0
SUM OF ALL RESPONSES TO PHQ9 QUESTIONS 1 & 2: 0
SUM OF ALL RESPONSES TO PHQ QUESTIONS 1-9: 0
SUM OF ALL RESPONSES TO PHQ QUESTIONS 1-9: 0
SUM OF ALL RESPONSES TO PHQ9 QUESTIONS 1 & 2: 0
SUM OF ALL RESPONSES TO PHQ QUESTIONS 1-9: 0

## 2023-10-27 ENCOUNTER — OFFICE VISIT (OUTPATIENT)
Dept: FAMILY MEDICINE CLINIC | Age: 58
End: 2023-10-27

## 2023-10-27 VITALS
SYSTOLIC BLOOD PRESSURE: 126 MMHG | HEIGHT: 68 IN | DIASTOLIC BLOOD PRESSURE: 72 MMHG | WEIGHT: 171.6 LBS | RESPIRATION RATE: 16 BRPM | OXYGEN SATURATION: 99 % | HEART RATE: 51 BPM | BODY MASS INDEX: 26.01 KG/M2 | TEMPERATURE: 97.2 F

## 2023-10-27 DIAGNOSIS — Z00.00 ROUTINE HEALTH MAINTENANCE: ICD-10-CM

## 2023-10-27 DIAGNOSIS — E78.00 PURE HYPERCHOLESTEROLEMIA: ICD-10-CM

## 2023-10-27 DIAGNOSIS — Z00.00 ROUTINE HEALTH MAINTENANCE: Primary | ICD-10-CM

## 2023-10-27 DIAGNOSIS — R59.0 ANTERIOR CERVICAL LYMPHADENOPATHY: ICD-10-CM

## 2023-10-27 LAB
ABSOLUTE IMMATURE GRANULOCYTE: 0.03 K/UL (ref 0–0.58)
ALBUMIN SERPL-MCNC: 4.6 G/DL (ref 3.5–5.2)
ALP BLD-CCNC: 77 U/L (ref 40–129)
ALT SERPL-CCNC: 17 U/L (ref 0–40)
ANION GAP SERPL CALCULATED.3IONS-SCNC: 12 MMOL/L (ref 7–16)
AST SERPL-CCNC: 22 U/L (ref 0–39)
BASOPHILS ABSOLUTE: 0.04 K/UL (ref 0–0.2)
BASOPHILS RELATIVE PERCENT: 1 % (ref 0–2)
BILIRUB SERPL-MCNC: 0.7 MG/DL (ref 0–1.2)
BUN BLDV-MCNC: 16 MG/DL (ref 6–20)
CALCIUM SERPL-MCNC: 9.4 MG/DL (ref 8.6–10.2)
CHLORIDE BLD-SCNC: 105 MMOL/L (ref 98–107)
CHOLESTEROL, FASTING: 155 MG/DL
CO2: 23 MMOL/L (ref 22–29)
CREAT SERPL-MCNC: 1 MG/DL (ref 0.7–1.2)
EOSINOPHILS ABSOLUTE: 0.07 K/UL (ref 0.05–0.5)
EOSINOPHILS RELATIVE PERCENT: 1 % (ref 0–6)
GFR SERPL CREATININE-BSD FRML MDRD: >60 ML/MIN/1.73M2
GLUCOSE BLD-MCNC: 78 MG/DL (ref 74–99)
HCT VFR BLD CALC: 43.4 % (ref 37–54)
HDLC SERPL-MCNC: 38 MG/DL
HEMOGLOBIN: 14.4 G/DL (ref 12.5–16.5)
IMMATURE GRANULOCYTES: 0 % (ref 0–5)
LDL CHOLESTEROL: 96 MG/DL
LYMPHOCYTES ABSOLUTE: 1.78 K/UL (ref 1.5–4)
LYMPHOCYTES RELATIVE PERCENT: 26 % (ref 20–42)
MCH RBC QN AUTO: 29.6 PG (ref 26–35)
MCHC RBC AUTO-ENTMCNC: 33.2 G/DL (ref 32–34.5)
MCV RBC AUTO: 89.3 FL (ref 80–99.9)
MONOCYTES ABSOLUTE: 0.44 K/UL (ref 0.1–0.95)
MONOCYTES RELATIVE PERCENT: 7 % (ref 2–12)
NEUTROPHILS ABSOLUTE: 4.46 K/UL (ref 1.8–7.3)
NEUTROPHILS RELATIVE PERCENT: 65 % (ref 43–80)
PDW BLD-RTO: 12.8 % (ref 11.5–15)
PLATELET # BLD: 185 K/UL (ref 130–450)
PMV BLD AUTO: 12.4 FL (ref 7–12)
POTASSIUM SERPL-SCNC: 4.6 MMOL/L (ref 3.5–5)
RBC # BLD: 4.86 M/UL (ref 3.8–5.8)
SODIUM BLD-SCNC: 140 MMOL/L (ref 132–146)
TOTAL PROTEIN: 7.3 G/DL (ref 6.4–8.3)
TRIGLYCERIDE, FASTING: 104 MG/DL
VLDLC SERPL CALC-MCNC: 21 MG/DL
WBC # BLD: 6.8 K/UL (ref 4.5–11.5)

## 2023-10-27 RX ORDER — MINOXIDIL 2.5 MG/1
TABLET ORAL
COMMUNITY
Start: 2023-09-29

## 2023-10-27 RX ORDER — ZOSTER VACCINE RECOMBINANT, ADJUVANTED 50 MCG/0.5
0.5 KIT INTRAMUSCULAR SEE ADMIN INSTRUCTIONS
Qty: 0.5 ML | Refills: 0 | Status: SHIPPED | OUTPATIENT
Start: 2023-10-27 | End: 2024-04-24

## 2023-10-27 SDOH — ECONOMIC STABILITY: INCOME INSECURITY: HOW HARD IS IT FOR YOU TO PAY FOR THE VERY BASICS LIKE FOOD, HOUSING, MEDICAL CARE, AND HEATING?: NOT HARD AT ALL

## 2023-10-27 SDOH — ECONOMIC STABILITY: HOUSING INSECURITY
IN THE LAST 12 MONTHS, WAS THERE A TIME WHEN YOU DID NOT HAVE A STEADY PLACE TO SLEEP OR SLEPT IN A SHELTER (INCLUDING NOW)?: NO

## 2023-10-27 SDOH — ECONOMIC STABILITY: FOOD INSECURITY: WITHIN THE PAST 12 MONTHS, YOU WORRIED THAT YOUR FOOD WOULD RUN OUT BEFORE YOU GOT MONEY TO BUY MORE.: NEVER TRUE

## 2023-10-27 SDOH — ECONOMIC STABILITY: FOOD INSECURITY: WITHIN THE PAST 12 MONTHS, THE FOOD YOU BOUGHT JUST DIDN'T LAST AND YOU DIDN'T HAVE MONEY TO GET MORE.: NEVER TRUE

## 2023-10-27 NOTE — PROGRESS NOTES
10/27/2023    Jorge Lund (:  1965) is a 62 y.o. male, here for a preventive medicine evaluation. Patient Active Problem List   Diagnosis    Acute exacerbation of chronic low back pain    Strain of muscle, fascia and tendon of lower back, sequela    Lumbar paraspinal muscle spasm       Hyperlipidemia:  Had repeat cholesterol screening months ago at work and his LDL had come down. Patient did self decrease his atorvastatin to 5 mg. Watching diet. Denies any chest pain, shortness of breath, edema, headache or vision changes. Denies fevers, chills, night sweats or weight change. Lymphadenopathy:   Has noticed shortly after getting his last COVID-vaccine about 1 year ago he developed a lymph node in the right side of his neck under his ear. Lymph node has not enlarged but has also not decreased in size. This is not tender. Patient denies any other lymph nodes that have been swollen per his notice. Patient denies any fevers, chills, weight changes or any recent URI symptoms. No change in bowel or bladder habits. Review of Systems Negative unless otherwise stated in HPI. Prior to Visit Medications    Medication Sig Taking?  Authorizing Provider   zoster recombinant adjuvanted vaccine (SHINGRIX) 50 MCG/0.5ML SUSR injection Inject 0.5 mLs into the muscle See Admin Instructions 1 dose now and repeat in 2-6 months Yes Brittany Reddy MD   minoxidil (LONITEN) 2.5 MG tablet TAKE 1/4 (ONE-FOURTH) TABLET BY MOUTH ONCE DAILY Yes Olga Ocampo MD   atorvastatin (LIPITOR) 10 MG tablet Take 1 tablet by mouth daily Yes Derek MONROE DO   finasteride (PROPECIA) 1 MG tablet TAKE 1 TABLET BY MOUTH ONCE DAILY Yes Olga Ocampo MD   Coenzyme Q10 (COQ10) 100 MG CAPS Take 100 mg by mouth daily  Patient not taking: Reported on 10/27/2023  Josephine Espana DO   acetaminophen (TYLENOL) 500 MG tablet Take 1 tablet by mouth 3 times daily as needed for Pain  Josephine Espana DO

## 2023-10-31 DIAGNOSIS — R59.0 ANTERIOR CERVICAL LYMPHADENOPATHY: Primary | ICD-10-CM

## 2023-10-31 DIAGNOSIS — E78.00 PURE HYPERCHOLESTEROLEMIA: ICD-10-CM

## 2023-10-31 RX ORDER — VITAMIN B COMPLEX
100 TABLET ORAL DAILY
Qty: 90 CAPSULE | Refills: 3 | Status: SHIPPED | OUTPATIENT
Start: 2023-10-31

## 2023-10-31 RX ORDER — ATORVASTATIN CALCIUM 10 MG/1
10 TABLET, FILM COATED ORAL DAILY
Qty: 90 TABLET | Refills: 3 | Status: SHIPPED | OUTPATIENT
Start: 2023-10-31 | End: 2024-01-29

## 2024-01-03 DIAGNOSIS — E78.00 PURE HYPERCHOLESTEROLEMIA: ICD-10-CM

## 2024-01-03 RX ORDER — ATORVASTATIN CALCIUM 10 MG/1
10 TABLET, FILM COATED ORAL DAILY
Qty: 90 TABLET | Refills: 3 | Status: SHIPPED | OUTPATIENT
Start: 2024-01-03 | End: 2025-01-03

## 2024-01-03 NOTE — TELEPHONE ENCOUNTER
Atorvastatin 10 mg   [facility]     Last Appointment   10/27/2023  Next Appointment  10/28/2024

## 2024-02-05 DIAGNOSIS — M79.89 SOFT TISSUE MASS: Primary | ICD-10-CM

## 2024-02-06 ENCOUNTER — TELEPHONE (OUTPATIENT)
Dept: FAMILY MEDICINE CLINIC | Age: 59
End: 2024-02-06

## 2024-02-06 DIAGNOSIS — M79.89 SOFT TISSUE MASS: ICD-10-CM

## 2024-02-06 DIAGNOSIS — R59.0 ANTERIOR CERVICAL LYMPHADENOPATHY: Primary | ICD-10-CM

## 2024-02-06 NOTE — TELEPHONE ENCOUNTER
IR called saying they received the order for US fine needle aspiration but there is no location listed on the order. They will need the order addended to specify where the soft tissue mass is located.

## 2024-02-06 NOTE — TELEPHONE ENCOUNTER
RECOMMENDATIONS:  Given the persistence of this lesion and the suspected cystic nature, suggest  ultrasound-guided cyst aspiration to confirm cystic nature.  If the lesion  turns out to be solid, tissue sampling could be considered at that time.     Setting up IR referral.  Please inform patient.  If he has further questions we can attempt to answer them by phone.  If truly a simple cyst, we will get recommendations from the radiologist at that time as to how to proceed (watching with repeat ultrasound, how often, etc).      Left message for patient to call back

## 2024-02-09 NOTE — TELEPHONE ENCOUNTER
Left detailed message to notify patient.   Results and recommendations sent to patient in letter to my chart.

## 2024-03-19 ENCOUNTER — HOSPITAL ENCOUNTER (OUTPATIENT)
Dept: ULTRASOUND IMAGING | Age: 59
Discharge: HOME | End: 2024-03-19
Payer: COMMERCIAL

## 2024-03-19 DIAGNOSIS — M79.89 SOFT TISSUE MASS: ICD-10-CM

## 2024-03-19 DIAGNOSIS — R59.0 ANTERIOR CERVICAL LYMPHADENOPATHY: ICD-10-CM

## 2024-03-19 PROCEDURE — 88305 TISSUE EXAM BY PATHOLOGIST: CPT

## 2024-03-19 PROCEDURE — 88173 CYTOPATH EVAL FNA REPORT: CPT

## 2024-03-19 PROCEDURE — 10005 FNA BX W/US GDN 1ST LES: CPT

## 2024-03-20 LAB — NON-GYN CYTOLOGY REPORT: NORMAL

## 2024-03-21 ENCOUNTER — TELEPHONE (OUTPATIENT)
Dept: FAMILY MEDICINE CLINIC | Age: 59
End: 2024-03-21

## 2024-03-21 DIAGNOSIS — D11.0 PAROTID PLEOMORPHIC ADENOMA: Primary | ICD-10-CM

## 2024-03-21 DIAGNOSIS — D11.0 PLEOMORPHIC ADENOMA OF PAROTID GLAND: Primary | ICD-10-CM

## 2024-03-21 NOTE — TELEPHONE ENCOUNTER
Patient notified.   He is going to look up reviews on both doctors and then give the office a call back with his choice    Referral not ordered. Awaiting call back from patient

## 2024-03-21 NOTE — TELEPHONE ENCOUNTER
Patient notified   He would like to know who your first preferred pick is for ENT?    Depending on who you choose, make the referral out to that location.   He will also look up information on the specialist listed.

## 2024-03-21 NOTE — TELEPHONE ENCOUNTER
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    Pleomorphic adenomas harbor a small risk of malignant transformation. The malignant potential is proportional to the time the lesion is in situ (1.5% in the first five years, 9.5% after 15 years). Therefore, excision is warranted in almost all cases but defer to ENT surgeon regarding this.     See if he would like to come to [facility], or to [facility] in [place]

## 2024-03-21 NOTE — TELEPHONE ENCOUNTER
----- Message from Maricarmen Jaramillo MA sent at 3/21/2024  2:01 PM EDT -----  Subject: Referral Request    Reason for referral request? Patient is calling back to let Dr. Echeverria   know that he prefers Dr. Hayes. Please advise   Provider patient wants to be referred to(if known):     Provider Phone Number(if known):    Additional Information for Provider?   ---------------------------------------------------------------------------  --------------  CALL BACK INFO    [phone]; OK to leave message on voicemail  ---------------------------------------------------------------------------  --------------

## 2024-05-22 ENCOUNTER — OFFICE VISIT (OUTPATIENT)
Dept: ENT CLINIC | Age: 59
End: 2024-05-22
Payer: COMMERCIAL

## 2024-05-22 VITALS — BODY MASS INDEX: 25.01 KG/M2 | WEIGHT: 165 LBS | HEIGHT: 68 IN

## 2024-05-22 DIAGNOSIS — K11.8 PAROTID MASS: Primary | ICD-10-CM

## 2024-05-22 DIAGNOSIS — D11.0 PLEOMORPHIC ADENOMA OF PAROTID GLAND: ICD-10-CM

## 2024-05-22 PROCEDURE — 99204 OFFICE O/P NEW MOD 45 MIN: CPT | Performed by: OTOLARYNGOLOGY

## 2024-05-22 NOTE — PROGRESS NOTES
Subjective:      Patient ID:  Walker Edwards is a 58 y.o. male.    HPI:    Patient presents today for new neck mass.  It is on the right side.  This was 1st noticed 1 year(s) ago by patient after he had COVID.     Previous history of Cancer - No   Type? none  Pain? No  Is mass enlarging? Yes  Recent infections? No    Social History     Tobacco Use   Smoking Status Never   Smokeless Tobacco Never       Patient's medications,allergies, past medical, surgical, social and family histories were reviewed and updated as appropriate.        Review of Systems   Constitutional:  Negative for chills, fever and unexpected weight change.   HENT:  Negative for sore throat and trouble swallowing.    Respiratory:  Negative for shortness of breath.    Musculoskeletal:  Negative for neck pain and neck stiffness.   All other systems reviewed and are negative.              Objective:   Physical Exam  Constitutional:       General: He is not in acute distress.     Appearance: Normal appearance.   HENT:      Head: Normocephalic and atraumatic.      Right Ear: Tympanic membrane and ear canal normal.      Left Ear: Tympanic membrane and ear canal normal.      Nose: Nose normal.      Mouth/Throat:      Mouth: Mucous membranes are moist.      Pharynx: No oropharyngeal exudate.   Eyes:      Extraocular Movements: Extraocular movements intact.      Pupils: Pupils are equal, round, and reactive to light.   Neck:      Comments: ~ 1 cm mobile, right tail of parotid mass  Cardiovascular:      Rate and Rhythm: Normal rate.   Pulmonary:      Effort: Pulmonary effort is normal.   Musculoskeletal:         General: Normal range of motion.      Cervical back: Normal range of motion and neck supple.   Lymphadenopathy:      Cervical: No cervical adenopathy.   Skin:     General: Skin is warm and dry.   Neurological:      General: No focal deficit present.      Mental Status: He is alert and oriented to person, place, and time.   Psychiatric:         Mood

## 2024-05-22 NOTE — PATIENT INSTRUCTIONS
Thank you for choosing our [facility]. We are committed to your medical treatment and  care. If you need to reschedule or cancel your surgery or follow up  appointment, please call the surgery scheduler at [phone].    INSTRUCTIONS FOR SURGERY Right Superficial Parotidectomy    Nothing to eat or drink after midnight the night before surgery unless surgery is at [facility] or otherwise instructed by the hospital.    DO NOT TAKE ANY ASPIRIN PRODUCTS 7 days prior to surgery-unless required by your cardiologist or primary care physician. Tylenol only. No Advil, Motrin, Aleve, or Ibuprofen    Any illegal drugs in your system (including Marijuana even if legally prescribed) will result in your surgery being cancelled. Please be sure to check with our office or the hospital on time frame for the drugs to be out of your system.    Should your insurance change at any time you must contact our office. Failure to do so may result in your surgery being rescheduled.    If you need paperwork filled out for work, you must give the office 2 weeks to complete and submit the forms.      O [facility], [address] [place] [address] will call you a couple days prior to your surgery and give you further instructions, if any questions call them at [phone]

## 2024-05-23 ENCOUNTER — TELEPHONE (OUTPATIENT)
Dept: ENT CLINIC | Age: 59
End: 2024-05-23

## 2024-05-23 ENCOUNTER — PREP FOR PROCEDURE (OUTPATIENT)
Dept: ENT CLINIC | Age: 59
End: 2024-05-23

## 2024-05-23 DIAGNOSIS — K11.8 PAROTID MASS: ICD-10-CM

## 2024-05-23 NOTE — TELEPHONE ENCOUNTER
Prior Authorization Form:      DEMOGRAPHICS:                     Patient Name:  Walker Edwards  Patient :  1965            Insurance:  Payor: [facility] / Plan: [facility] PPO / Product Type: *No Product type* /   Insurance ID Number:    Payer/Plan Subscr  Sex Relation Sub. Ins. ID Effective Group Num   1. [employer] - ANTHEM* WALKER EDWARDS 1965 Male Self [id number]                                   PO          DIAGNOSIS & PROCEDURE:                       Procedure/Operation: Right Superficial Parotidectomy            CPT Code: 23819    Diagnosis:  right parotid mass    ICD10 Code: K11.8    Location:  [facility]    Surgeon:  uri    SCHEDULING INFORMATION:                          Date: 24    Time: n/a              Anesthesia:  General                                                       Status:  Outpatient        Special Comments:  include all chart notes        Electronically signed by Jose Calvo MA on 2024 at 10:11 AM

## 2024-07-09 ENCOUNTER — TELEPHONE (OUTPATIENT)
Dept: FAMILY MEDICINE CLINIC | Age: 59
End: 2024-07-09

## 2024-07-09 ENCOUNTER — OFFICE VISIT (OUTPATIENT)
Dept: FAMILY MEDICINE CLINIC | Age: 59
End: 2024-07-09
Payer: COMMERCIAL

## 2024-07-09 VITALS
DIASTOLIC BLOOD PRESSURE: 72 MMHG | RESPIRATION RATE: 18 BRPM | SYSTOLIC BLOOD PRESSURE: 111 MMHG | TEMPERATURE: 97.4 F | HEIGHT: 68 IN | OXYGEN SATURATION: 98 % | WEIGHT: 165 LBS | HEART RATE: 62 BPM | BODY MASS INDEX: 25.01 KG/M2

## 2024-07-09 DIAGNOSIS — L53.9 ERYTHEMA OF TOE: Primary | ICD-10-CM

## 2024-07-09 PROCEDURE — 99212 OFFICE O/P EST SF 10 MIN: CPT

## 2024-07-09 ASSESSMENT — PATIENT HEALTH QUESTIONNAIRE - PHQ9
SUM OF ALL RESPONSES TO PHQ9 QUESTIONS 1 & 2: 0
SUM OF ALL RESPONSES TO PHQ QUESTIONS 1-9: 0
DEPRESSION UNABLE TO ASSESS: FUNCTIONAL CAPACITY MOTIVATION LIMITS ACCURACY
1. LITTLE INTEREST OR PLEASURE IN DOING THINGS: NOT AT ALL
SUM OF ALL RESPONSES TO PHQ QUESTIONS 1-9: 0
2. FEELING DOWN, DEPRESSED OR HOPELESS: NOT AT ALL

## 2024-07-09 NOTE — PROGRESS NOTES
Chief Complaint   Insect Bite (On right big toe, went to urgent care they thought was a tick put him on doxy, but toe is discolored now)      History of Present Illness   Source of history provided by:  patient.      Walker Edwards is a 58 y.o. old male presenting to the walk in clinic for evaluation of insect bite to the right big toe, which began 4 days ago. Patient is uncertain what bit him but he has spent some time in the woods and has seen ticks. Reports the area is warm to touch, moderately painful, and swollen. Denies lymphangitic streaking. Denies any bleeding or active drainage. Since onset the symptoms have progressed. Patient reports fever and chills yesterday and right groin lymph node swelling. Denies any HA, recent illness, myalgias, nausea, vomiting, or lethargy. Pt has tried Tylenol OTC without relief. Pt has not been stung by insect in the past. Pt denies throat swelling or additional symptoms.     ROS    Unless otherwise stated in this report or unable to obtain because of the patient's clinical or mental status as evidenced by the medical record, this patients's positive and negative responses for Review of Systems, constitutional, psych, eyes, ENT, cardiovascular, respiratory, gastrointestinal, neurological, genitourinary, musculoskeletal, integument systems and systems related to the presenting problem are either stated in the preceding or were not pertinent or were negative for the symptoms and/or complaints related to the medical problem.    Physical Exam         VS:  /72   Pulse 62   Temp 97.4 °F (36.3 °C) (Temporal)   Resp 18   Ht 1.727 m (5' 7.99\")   Wt 74.8 kg (165 lb)   SpO2 98%   BMI 25.09 kg/m²    Oxygen Saturation Interpretation: Normal.    General Appearance/Constitutional:  Alert, development consistent with age.  HEENT:  NC/NT. PERRLA.  Airway patent.  Neck:  Normal ROM.  Supple.  Respiratory:  Clear to auscultation and breath sounds equal.  CV:  Regular rate and

## 2024-07-09 NOTE — TELEPHONE ENCOUNTER
Patient called stating he had Flu like symptoms and fever beginning Friday, 7/5/24 and was taking Tylenol.  He informed me that he went to [facility] on Sunday since he wasn't feeling better.  Patient informed me that the lymph nodes in his Rt groin were enlarged.  He stated a urine lab was done which showed protein in it.  Patient informed me that he was put on Doxycycline 100 mg, 2 times daily and advised to take Tylenol for pain.  Patient informed me that later on Sunday he noticed that his Rt big toe was red and had a bulls eye jesus manuel on it.  He stated he called [facility] to inform them of this.  He stated he began taking abx Monday, since pharmacy was closed on Sunday and has had a total of 3 tablets.  He informed me that he took Tylenol PM last night and was running a fever.  He stated that today, his toe is looking worse and he's concerned.  He stated that he will try to upload photos to show the difference in how it looks from Sunday to today.  I informed patient that I will send a msg to Dr. Echeverria.    Last seen 10/27/2023  Next appt 10/28/2024    Requested Prescriptions      No prescriptions requested or ordered in this encounter      [facility]

## 2024-07-09 NOTE — TELEPHONE ENCOUNTER
Per Dr. Echeverria -    Come to walk in or urgent care  If related to tick bite, the doxycycline will help but could be something unrelated or a paronychia that needs drained.      I called patient and advised him, as noted by Dr. Echeverria.  Patient verbalized understanding and was agreeable.

## 2024-07-22 RX ORDER — VIT B COMP NO.3/FOLIC/C/BIOTIN 1 MG-60 MG
1 TABLET ORAL
COMMUNITY

## 2024-07-22 NOTE — PROGRESS NOTES
[facility] PRE-ADMISSION TESTING INSTRUCTIONS    The Preadmission Testing patient is instructed accordingly using the following criteria (check applicable):    ARRIVAL INSTRUCTIONS:  [x] Parking the day of Surgery is located in the Main Entrance lot.  Upon entering the door, make an immediate right to the surgery reception desk    [x] Bring photo ID and insurance card    [] Bring in a copy of Living will or Durable Power of  papers.    [x] Please be sure to arrange for responsible adult to provide transportation to and from the hospital    [x] Please arrange for responsible adult to be with you for the 24 hour period post procedure due to having anesthesia    [x] If you awake am of surgery not feeling well or have temperature >100 please call [phone]    GENERAL INSTRUCTIONS:    [x] May have clear liquids until 4 hours prior to surgery. Examples include water, fruit juices (no pulp), jello, popsicles, black coffee or tea, beef or chicken broth.         No gum, candy or mints.    [x] You may brush your teeth, but do not swallow any water    [] Take medications as instructed with 1-2 oz of water    [x] Stop herbal supplements and vitamins 5 days prior to procedure    [] Follow preop dosing of blood thinners per physician instructions    [] Take 1/2 dose of evening insulin, but no insulin after midnight    [] No oral diabetic medications after midnight    [] If diabetic and have low blood sugar or feel symptomatic, take 1-2oz apple juice only    [] Bring inhalers day of surgery    [] Bring C-PAP/ Bi-Pap day of surgery    [] Bring urine specimen day of surgery    [x] Shower or bath with soap, lather and rinse well, AM of Surgery, no lotion, powders or creams to surgical site    [] Follow bowel prep as instructed per surgeon    [x] No tobacco products within 24 hours of surgery     [x] No alcohol or illegal drug use within 24 hours of surgery.    [x] Jewelry, body piercing's,

## 2024-07-23 NOTE — H&P
Subjective:      Patient ID:  Walker Edwards is a 58 y.o. male.     HPI:     Patient presents today for new neck mass.  It is on the right side.  This was 1st noticed 1 year(s) ago by patient after he had COVID.      Previous history of Cancer - No               Type? none  Pain? No  Is mass enlarging? Yes  Recent infections? No     Social History          Tobacco Use   Smoking Status Never   Smokeless Tobacco Never         Patient's medications,allergies, past medical, surgical, social and family histories were reviewed and updated as appropriate.           Review of Systems   Constitutional:  Negative for chills, fever and unexpected weight change.   HENT:  Negative for sore throat and trouble swallowing.    Respiratory:  Negative for shortness of breath.    Musculoskeletal:  Negative for neck pain and neck stiffness.   All other systems reviewed and are negative.                    Objective:   Physical Exam  Constitutional:       General: He is not in acute distress.     Appearance: Normal appearance.   HENT:      Head: Normocephalic and atraumatic.      Right Ear: Tympanic membrane and ear canal normal.      Left Ear: Tympanic membrane and ear canal normal.      Nose: Nose normal.      Mouth/Throat:      Mouth: Mucous membranes are moist.      Pharynx: No oropharyngeal exudate.   Eyes:      Extraocular Movements: Extraocular movements intact.      Pupils: Pupils are equal, round, and reactive to light.   Neck:      Comments: ~ 1 cm mobile, right tail of parotid mass  Cardiovascular:      Rate and Rhythm: Normal rate.   Pulmonary:      Effort: Pulmonary effort is normal.   Musculoskeletal:         General: Normal range of motion.      Cervical back: Normal range of motion and neck supple.   Lymphadenopathy:      Cervical: No cervical adenopathy.   Skin:     General: Skin is warm and dry.   Neurological:      General: No focal deficit present.      Mental Status: He is alert and oriented to person, place, and  time.   Psychiatric:         Mood and Affect: Mood normal.         Behavior: Behavior normal.               FNA:   NECK MASS ASPIRATE, RIGHT:   Satisfactory for evaluation.   NEGATIVE FOR MALIGNANCY.   Pleomorphic adenoma.   Cellblock is similar.         Assessment:        Diagnosis Orders   1. Parotid mass          2. Pleomorphic adenoma of parotid gland                                 Plan:      - I recommend right superficial parotidectomy with nerve integrity monitor     Parotid Surgery     Surgical risks include:    -- Facial paralysis. Can cause a dry eye and weakened facial movements. This can be temporary and may take up to a year to resolve.  -- The greater auricular nerve is sacrificed during surgery. This will cause numbness in the lower part of the auricle.  -- A sialocele or a collection of saliva under the skin can form.  This is treated by drainage and anticholinergic agents.       Follow up one week after surgery     Electronically signed by Davi Joe DO on 5/22/2024 at 9:43 AM

## 2024-07-24 ENCOUNTER — ANESTHESIA EVENT (OUTPATIENT)
Dept: OPERATING ROOM | Age: 59
End: 2024-07-24
Payer: COMMERCIAL

## 2024-07-25 ENCOUNTER — HOSPITAL ENCOUNTER (OUTPATIENT)
Age: 59
Setting detail: OUTPATIENT SURGERY
Discharge: HOME | End: 2024-07-25
Attending: OTOLARYNGOLOGY | Admitting: OTOLARYNGOLOGY
Payer: COMMERCIAL

## 2024-07-25 ENCOUNTER — ANESTHESIA (OUTPATIENT)
Dept: OPERATING ROOM | Age: 59
End: 2024-07-25
Payer: COMMERCIAL

## 2024-07-25 VITALS
DIASTOLIC BLOOD PRESSURE: 77 MMHG | BODY MASS INDEX: 24.86 KG/M2 | HEART RATE: 61 BPM | SYSTOLIC BLOOD PRESSURE: 129 MMHG | RESPIRATION RATE: 17 BRPM | TEMPERATURE: 97 F | HEIGHT: 68 IN | OXYGEN SATURATION: 96 % | WEIGHT: 164 LBS

## 2024-07-25 DIAGNOSIS — G89.18 POST-OP PAIN: Primary | ICD-10-CM

## 2024-07-25 DIAGNOSIS — K11.8 PAROTID MASS: ICD-10-CM

## 2024-07-25 LAB
EKG ATRIAL RATE: 56 BPM
EKG P AXIS: 41 DEGREES
EKG P-R INTERVAL: 146 MS
EKG Q-T INTERVAL: 444 MS
EKG QRS DURATION: 82 MS
EKG QTC CALCULATION (BAZETT): 428 MS
EKG R AXIS: 53 DEGREES
EKG T AXIS: 39 DEGREES
EKG VENTRICULAR RATE: 56 BPM

## 2024-07-25 PROCEDURE — 6360000002 HC RX W HCPCS: Performed by: ANESTHESIOLOGY

## 2024-07-25 PROCEDURE — 2580000003 HC RX 258

## 2024-07-25 PROCEDURE — 3700000000 HC ANESTHESIA ATTENDED CARE: Performed by: OTOLARYNGOLOGY

## 2024-07-25 PROCEDURE — 2500000003 HC RX 250 WO HCPCS: Performed by: OTOLARYNGOLOGY

## 2024-07-25 PROCEDURE — 2720000010 HC SURG SUPPLY STERILE: Performed by: OTOLARYNGOLOGY

## 2024-07-25 PROCEDURE — L0150 CERV SEMI-RIG ADJ MOLDED CHN: HCPCS | Performed by: OTOLARYNGOLOGY

## 2024-07-25 PROCEDURE — 7100000010 HC PHASE II RECOVERY - FIRST 15 MIN: Performed by: OTOLARYNGOLOGY

## 2024-07-25 PROCEDURE — 6370000000 HC RX 637 (ALT 250 FOR IP): Performed by: OTOLARYNGOLOGY

## 2024-07-25 PROCEDURE — 6360000002 HC RX W HCPCS

## 2024-07-25 PROCEDURE — 2580000003 HC RX 258: Performed by: ANESTHESIOLOGY

## 2024-07-25 PROCEDURE — 7100000011 HC PHASE II RECOVERY - ADDTL 15 MIN: Performed by: OTOLARYNGOLOGY

## 2024-07-25 PROCEDURE — 2709999900 HC NON-CHARGEABLE SUPPLY: Performed by: OTOLARYNGOLOGY

## 2024-07-25 PROCEDURE — 93005 ELECTROCARDIOGRAM TRACING: CPT

## 2024-07-25 PROCEDURE — 2500000003 HC RX 250 WO HCPCS

## 2024-07-25 PROCEDURE — 3700000001 HC ADD 15 MINUTES (ANESTHESIA): Performed by: OTOLARYNGOLOGY

## 2024-07-25 PROCEDURE — 7100000001 HC PACU RECOVERY - ADDTL 15 MIN: Performed by: OTOLARYNGOLOGY

## 2024-07-25 PROCEDURE — 2500000003 HC RX 250 WO HCPCS: Performed by: ANESTHESIOLOGY

## 2024-07-25 PROCEDURE — 88307 TISSUE EXAM BY PATHOLOGIST: CPT

## 2024-07-25 PROCEDURE — 3600000013 HC SURGERY LEVEL 3 ADDTL 15MIN: Performed by: OTOLARYNGOLOGY

## 2024-07-25 PROCEDURE — 42410 EXCISE PAROTID GLAND/LESION: CPT | Performed by: OTOLARYNGOLOGY

## 2024-07-25 PROCEDURE — 6370000000 HC RX 637 (ALT 250 FOR IP)

## 2024-07-25 PROCEDURE — 7100000000 HC PACU RECOVERY - FIRST 15 MIN: Performed by: OTOLARYNGOLOGY

## 2024-07-25 PROCEDURE — 3600000003 HC SURGERY LEVEL 3 BASE: Performed by: OTOLARYNGOLOGY

## 2024-07-25 PROCEDURE — 6370000000 HC RX 637 (ALT 250 FOR IP): Performed by: ANESTHESIOLOGY

## 2024-07-25 RX ORDER — DIPHENHYDRAMINE HYDROCHLORIDE 50 MG/ML
12.5 INJECTION INTRAMUSCULAR; INTRAVENOUS
Status: DISCONTINUED | OUTPATIENT
Start: 2024-07-25 | End: 2024-07-25 | Stop reason: HOSPADM

## 2024-07-25 RX ORDER — MEPERIDINE HYDROCHLORIDE 25 MG/ML
12.5 INJECTION INTRAMUSCULAR; INTRAVENOUS; SUBCUTANEOUS ONCE
Status: DISCONTINUED | OUTPATIENT
Start: 2024-07-25 | End: 2024-07-25 | Stop reason: HOSPADM

## 2024-07-25 RX ORDER — SODIUM CHLORIDE 0.9 % (FLUSH) 0.9 %
5-40 SYRINGE (ML) INJECTION PRN
Status: DISCONTINUED | OUTPATIENT
Start: 2024-07-25 | End: 2024-07-25 | Stop reason: HOSPADM

## 2024-07-25 RX ORDER — FENTANYL CITRATE 50 UG/ML
INJECTION, SOLUTION INTRAMUSCULAR; INTRAVENOUS PRN
Status: DISCONTINUED | OUTPATIENT
Start: 2024-07-25 | End: 2024-07-25 | Stop reason: SDUPTHER

## 2024-07-25 RX ORDER — ROCURONIUM BROMIDE 10 MG/ML
INJECTION, SOLUTION INTRAVENOUS PRN
Status: DISCONTINUED | OUTPATIENT
Start: 2024-07-25 | End: 2024-07-25 | Stop reason: SDUPTHER

## 2024-07-25 RX ORDER — NALOXONE HYDROCHLORIDE 0.4 MG/ML
INJECTION, SOLUTION INTRAMUSCULAR; INTRAVENOUS; SUBCUTANEOUS PRN
Status: DISCONTINUED | OUTPATIENT
Start: 2024-07-25 | End: 2024-07-25 | Stop reason: HOSPADM

## 2024-07-25 RX ORDER — SODIUM CHLORIDE 9 MG/ML
INJECTION, SOLUTION INTRAVENOUS PRN
Status: DISCONTINUED | OUTPATIENT
Start: 2024-07-25 | End: 2024-07-25 | Stop reason: HOSPADM

## 2024-07-25 RX ORDER — ONDANSETRON 2 MG/ML
INJECTION INTRAMUSCULAR; INTRAVENOUS PRN
Status: DISCONTINUED | OUTPATIENT
Start: 2024-07-25 | End: 2024-07-25 | Stop reason: SDUPTHER

## 2024-07-25 RX ORDER — KETAMINE HYDROCHLORIDE 10 MG/ML
INJECTION, SOLUTION INTRAMUSCULAR; INTRAVENOUS PRN
Status: DISCONTINUED | OUTPATIENT
Start: 2024-07-25 | End: 2024-07-25 | Stop reason: SDUPTHER

## 2024-07-25 RX ORDER — SODIUM CHLORIDE 0.9 % (FLUSH) 0.9 %
5-40 SYRINGE (ML) INJECTION EVERY 12 HOURS SCHEDULED
Status: DISCONTINUED | OUTPATIENT
Start: 2024-07-25 | End: 2024-07-25 | Stop reason: HOSPADM

## 2024-07-25 RX ORDER — METOCLOPRAMIDE HYDROCHLORIDE 5 MG/ML
10 INJECTION INTRAMUSCULAR; INTRAVENOUS ONCE
Status: COMPLETED | OUTPATIENT
Start: 2024-07-25 | End: 2024-07-25

## 2024-07-25 RX ORDER — CEPHALEXIN 500 MG/1
500 CAPSULE ORAL 2 TIMES DAILY
Qty: 10 CAPSULE | Refills: 0 | Status: SHIPPED | OUTPATIENT
Start: 2024-07-25 | End: 2024-07-30

## 2024-07-25 RX ORDER — FENTANYL CITRATE 50 UG/ML
25 INJECTION, SOLUTION INTRAMUSCULAR; INTRAVENOUS EVERY 5 MIN PRN
Status: DISCONTINUED | OUTPATIENT
Start: 2024-07-25 | End: 2024-07-25 | Stop reason: HOSPADM

## 2024-07-25 RX ORDER — MIDAZOLAM HYDROCHLORIDE 1 MG/ML
INJECTION INTRAMUSCULAR; INTRAVENOUS PRN
Status: DISCONTINUED | OUTPATIENT
Start: 2024-07-25 | End: 2024-07-25 | Stop reason: SDUPTHER

## 2024-07-25 RX ORDER — PHENYLEPHRINE HCL IN 0.9% NACL 1 MG/10 ML
SYRINGE (ML) INTRAVENOUS PRN
Status: DISCONTINUED | OUTPATIENT
Start: 2024-07-25 | End: 2024-07-25 | Stop reason: SDUPTHER

## 2024-07-25 RX ORDER — LIDOCAINE HYDROCHLORIDE 20 MG/ML
INJECTION, SOLUTION EPIDURAL; INFILTRATION; INTRACAUDAL; PERINEURAL PRN
Status: DISCONTINUED | OUTPATIENT
Start: 2024-07-25 | End: 2024-07-25 | Stop reason: SDUPTHER

## 2024-07-25 RX ORDER — HYDROCODONE BITARTRATE AND ACETAMINOPHEN 5; 325 MG/1; MG/1
1 TABLET ORAL EVERY 6 HOURS PRN
Qty: 12 TABLET | Refills: 0 | Status: SHIPPED | OUTPATIENT
Start: 2024-07-25 | End: 2024-07-28

## 2024-07-25 RX ORDER — ONDANSETRON 4 MG/1
4 TABLET, FILM COATED ORAL EVERY 8 HOURS PRN
Qty: 6 TABLET | Refills: 0 | Status: SHIPPED | OUTPATIENT
Start: 2024-07-25

## 2024-07-25 RX ORDER — LABETALOL HYDROCHLORIDE 5 MG/ML
5 INJECTION, SOLUTION INTRAVENOUS
Status: DISCONTINUED | OUTPATIENT
Start: 2024-07-25 | End: 2024-07-25 | Stop reason: HOSPADM

## 2024-07-25 RX ORDER — SUCCINYLCHOLINE/SOD CL,ISO/PF 200MG/10ML
SYRINGE (ML) INTRAVENOUS PRN
Status: DISCONTINUED | OUTPATIENT
Start: 2024-07-25 | End: 2024-07-25 | Stop reason: SDUPTHER

## 2024-07-25 RX ORDER — HYDRALAZINE HYDROCHLORIDE 20 MG/ML
5 INJECTION INTRAMUSCULAR; INTRAVENOUS
Status: DISCONTINUED | OUTPATIENT
Start: 2024-07-25 | End: 2024-07-25 | Stop reason: HOSPADM

## 2024-07-25 RX ORDER — ACETAMINOPHEN 500 MG
1000 TABLET ORAL ONCE
Status: COMPLETED | OUTPATIENT
Start: 2024-07-25 | End: 2024-07-25

## 2024-07-25 RX ORDER — LIDOCAINE HYDROCHLORIDE AND EPINEPHRINE 10; 10 MG/ML; UG/ML
INJECTION, SOLUTION INFILTRATION; PERINEURAL PRN
Status: DISCONTINUED | OUTPATIENT
Start: 2024-07-25 | End: 2024-07-25 | Stop reason: ALTCHOICE

## 2024-07-25 RX ORDER — DEXAMETHASONE SODIUM PHOSPHATE 4 MG/ML
INJECTION, SOLUTION INTRA-ARTICULAR; INTRALESIONAL; INTRAMUSCULAR; INTRAVENOUS; SOFT TISSUE PRN
Status: DISCONTINUED | OUTPATIENT
Start: 2024-07-25 | End: 2024-07-25 | Stop reason: SDUPTHER

## 2024-07-25 RX ORDER — PROCHLORPERAZINE EDISYLATE 5 MG/ML
5 INJECTION INTRAMUSCULAR; INTRAVENOUS
Status: DISCONTINUED | OUTPATIENT
Start: 2024-07-25 | End: 2024-07-25 | Stop reason: HOSPADM

## 2024-07-25 RX ORDER — SODIUM CHLORIDE, SODIUM LACTATE, POTASSIUM CHLORIDE, CALCIUM CHLORIDE 600; 310; 30; 20 MG/100ML; MG/100ML; MG/100ML; MG/100ML
INJECTION, SOLUTION INTRAVENOUS CONTINUOUS PRN
Status: DISCONTINUED | OUTPATIENT
Start: 2024-07-25 | End: 2024-07-25 | Stop reason: SDUPTHER

## 2024-07-25 RX ORDER — PROPOFOL 10 MG/ML
INJECTION, EMULSION INTRAVENOUS PRN
Status: DISCONTINUED | OUTPATIENT
Start: 2024-07-25 | End: 2024-07-25 | Stop reason: SDUPTHER

## 2024-07-25 RX ADMIN — ROCURONIUM BROMIDE 5 MG: 10 INJECTION, SOLUTION INTRAVENOUS at 12:54

## 2024-07-25 RX ADMIN — SODIUM CHLORIDE: 9 INJECTION, SOLUTION INTRAVENOUS at 12:50

## 2024-07-25 RX ADMIN — FAMOTIDINE 20 MG: 10 INJECTION INTRAVENOUS at 11:16

## 2024-07-25 RX ADMIN — SODIUM CHLORIDE, POTASSIUM CHLORIDE, SODIUM LACTATE AND CALCIUM CHLORIDE: 600; 310; 30; 20 INJECTION, SOLUTION INTRAVENOUS at 13:06

## 2024-07-25 RX ADMIN — ONDANSETRON 4 MG: 2 INJECTION INTRAMUSCULAR; INTRAVENOUS at 14:38

## 2024-07-25 RX ADMIN — DEXAMETHASONE SODIUM PHOSPHATE 10 MG: 4 INJECTION, SOLUTION INTRAMUSCULAR; INTRAVENOUS at 12:58

## 2024-07-25 RX ADMIN — FENTANYL CITRATE 50 MCG: 50 INJECTION, SOLUTION INTRAMUSCULAR; INTRAVENOUS at 14:05

## 2024-07-25 RX ADMIN — Medication 100 MCG: at 13:09

## 2024-07-25 RX ADMIN — Medication 120 MG: at 12:54

## 2024-07-25 RX ADMIN — LIDOCAINE HYDROCHLORIDE 100 MG: 20 INJECTION, SOLUTION EPIDURAL; INFILTRATION; INTRACAUDAL; PERINEURAL at 12:54

## 2024-07-25 RX ADMIN — FENTANYL CITRATE 100 MCG: 50 INJECTION, SOLUTION INTRAMUSCULAR; INTRAVENOUS at 12:54

## 2024-07-25 RX ADMIN — METOCLOPRAMIDE 10 MG: 5 INJECTION, SOLUTION INTRAMUSCULAR; INTRAVENOUS at 11:16

## 2024-07-25 RX ADMIN — WATER 2000 MG: 1 INJECTION INTRAMUSCULAR; INTRAVENOUS; SUBCUTANEOUS at 12:58

## 2024-07-25 RX ADMIN — KETAMINE HYDROCHLORIDE 20 MG: 10 INJECTION INTRAMUSCULAR; INTRAVENOUS at 14:00

## 2024-07-25 RX ADMIN — ACETAMINOPHEN 1000 MG: 500 TABLET ORAL at 11:16

## 2024-07-25 RX ADMIN — KETAMINE HYDROCHLORIDE 20 MG: 10 INJECTION INTRAMUSCULAR; INTRAVENOUS at 12:54

## 2024-07-25 RX ADMIN — PROPOFOL 180 MG: 10 INJECTION, EMULSION INTRAVENOUS at 12:54

## 2024-07-25 RX ADMIN — Medication 100 MCG: at 13:35

## 2024-07-25 RX ADMIN — MIDAZOLAM 2 MG: 1 INJECTION INTRAMUSCULAR; INTRAVENOUS at 12:52

## 2024-07-25 RX ADMIN — FENTANYL CITRATE 50 MCG: 50 INJECTION, SOLUTION INTRAMUSCULAR; INTRAVENOUS at 13:46

## 2024-07-25 ASSESSMENT — PAIN - FUNCTIONAL ASSESSMENT: PAIN_FUNCTIONAL_ASSESSMENT: 0-10

## 2024-07-25 ASSESSMENT — PAIN SCALES - GENERAL
PAINLEVEL_OUTOF10: 0

## 2024-07-25 ASSESSMENT — LIFESTYLE VARIABLES: SMOKING_STATUS: 0

## 2024-07-25 NOTE — PROGRESS NOTES
Patient re-assessed in PACU. FN intact bilaterally (HB I/VI). Pain controlled. Jobst in place.     Raúl Jones DO,  Resident Physician, PGY-2  Department of Otolaryngology, [facility]

## 2024-07-25 NOTE — ANESTHESIA POSTPROCEDURE EVALUATION
Department of Anesthesiology  Postprocedure Note    Patient: Walker Edwards  MRN: [id number]  YOB: 1965  Date of evaluation: 7/25/2024    Procedure Summary       Date: 07/25/24 Room / Location: [address]    Anesthesia Start: 1244 Anesthesia Stop: 1450    Procedure: RIGHT SUPERFICIAL PAROTIDECTOMY (Right: Face) Diagnosis:       Parotid mass      (Parotid mass [K11.8])    Surgeons: Brock Hayes DO Responsible Provider: Garett Villegas DO    Anesthesia Type: General ASA Status: 2            Anesthesia Type: General    Angely Phase I: Angely Score: 7    Angely Phase II:      Anesthesia Post Evaluation    Patient location during evaluation: bedside  Patient participation: complete - patient participated  Level of consciousness: responsive to light touch  Pain score: 3  Airway patency: patent  Nausea & Vomiting: no vomiting and no nausea  Cardiovascular status: hemodynamically stable  Respiratory status: acceptable  Hydration status: stable  Comments: Patient seen and examined.  Progressing as expected.   Multimodal analgesia pain management approach  Pain management: adequate    No notable events documented.

## 2024-07-25 NOTE — DISCHARGE INSTRUCTIONS
ENT Post-Op Instructions    Follow up with Dr. Hayes in 1 week(s). CALL OFFICE TO SCHEDULE/CONFIRM APPOINTMENT    Please follow the instructions below:    DIET INSTRUCTIONS:  Regular diet. Start with light meals today and increase as tolerated.    ACTIVITY INSTRUCTIONS:  Increase activity as tolerated    Elevate Head of bed   No heavy lifting or strenuous activity until your cleared at your post-operative appointment   No driving while taking pain medication    WOUND/DRESSING INSTRUCTIONS:  May shower normally in 24 hours from time of surgery. May shower from the neck down tonight.      Ice to areas of pain.     You have sutures that dissolve and do not need to be removed.  You have steri-trip bandages (white bandages) over your incision. Leave these in place. Do not remove them. They will fall off in about 1 week. They will curl and peel at the edges, this is normal. They are OK to get wet, but do not soak. Blot dry, do not scrub.   Once the steri strips have fallen off or been removed in the office, place antibiotic ointment on the incision twice a day for 1-2 weeks.   OK to use scar cream after 2 weeks. Use sunscreen when going out in the sun for the first 6 months. Be careful to not extend your head backwards for a few weeks, this puts tension on the incision line and can cause more scarring.      MEDICATION INSTRUCTIONS:  Take medication as prescribed.  When taking pain medications, you may experience dizziness or drowsiness.  Do not drink alcohol or drive when taking these medications.  You may take Ibuprofen (over the counter) as per directions for mild pain.  Do not take any other acetaminophen (Tylenol) products while taking your pain medication.  You may experience constipation while taking narcotic pain medication - You may take over the counter stool softeners: docuscate (Colace) or sennosides S (Senokot - S).     Call physician for any of the following or for questions/concerns:   Fever over 101°

## 2024-07-25 NOTE — ANESTHESIA PRE PROCEDURE
Department of Anesthesiology  Preprocedure Note       Name:  Walker Edwards   Age:  58 y.o.  :  1965                                          MRN:  [id number]         Date:  2024      Surgeon: Surgeon(s):  Brock Hayes DO    Procedure: Procedure(s):  RIGHT SUPERFICIAL PAROTIDECTOMY                +++NERVE INTEGRITY MONITOR+++    Medications prior to admission:   Prior to Admission medications    Medication Sig Start Date End Date Taking? Authorizing Provider   B complex-vitamin C-folic acid (NEPHRO-LISA) 1 MG tablet Take 1 tablet by mouth daily (with breakfast)   Yes ProviderOlga MD   atorvastatin (LIPITOR) 10 MG tablet Take 1 tablet by mouth daily  Patient taking differently: Take 0.5 tablets by mouth daily 1/3/24 1/3/25  Cristian Echeverria DO   Coenzyme Q10 (COQ10) 100 MG CAPS Take 100 mg by mouth daily 10/31/23   Cristain Echeverria DO   minoxidil (LONITEN) 2.5 MG tablet TAKE 1/4 (ONE-FOURTH) TABLET BY MOUTH ONCE DAILY 23   ProviderOlga MD   finasteride (PROPECIA) 1 MG tablet TAKE 1 TABLET BY MOUTH ONCE DAILY 22   Olga Ocampo MD   acetaminophen (TYLENOL) 500 MG tablet Take 1 tablet by mouth 3 times daily as needed for Pain 22   Cristian Echeverria DO       Current medications:    No current facility-administered medications for this encounter.     Current Outpatient Medications   Medication Sig Dispense Refill    B complex-vitamin C-folic acid (NEPHRO-LISA) 1 MG tablet Take 1 tablet by mouth daily (with breakfast)      atorvastatin (LIPITOR) 10 MG tablet Take 1 tablet by mouth daily (Patient taking differently: Take 0.5 tablets by mouth daily) 90 tablet 3    Coenzyme Q10 (COQ10) 100 MG CAPS Take 100 mg by mouth daily 90 capsule 3    minoxidil (LONITEN) 2.5 MG tablet TAKE 1/4 (ONE-FOURTH) TABLET BY MOUTH ONCE DAILY      finasteride (PROPECIA) 1 MG tablet TAKE 1 TABLET BY MOUTH ONCE DAILY      acetaminophen (TYLENOL) 500 MG tablet Take 1 tablet by

## 2024-07-25 NOTE — H&P
Walker Edwards was seen and re-examined preoperatively today, July 25, 2024.  There was no substantial change in his physical and medical status.  Patient is fit for the proposed surgical procedure.  All questions were appropriately addressed and had no further questions regarding the risks, benefits, and alternatives of the procedure.  Walker Edwards and family wished to proceed.    Raúl Jones DO  Resident Physician  [facility]  Otolaryngology Residency  7/25/2024  10:49 AM

## 2024-07-25 NOTE — OP NOTE
Operative Note      Patient: Walker Edwards  YOB: 1965  MRN: [id number]    Date of Procedure: 7/25/2024    Pre-Op Diagnosis Codes:     * Parotid mass [K11.8]    Post-Op Diagnosis: Same       Procedure(s):  RIGHT SUPERFICIAL PAROTIDECTOMY    Surgeon(s):  Brock Hayes DO    Assistant:   Resident: Raúl Jones DO    Anesthesia: General    Estimated Blood Loss (mL): less than 20 cc    Complications: None    Specimens:   ID Type Source Tests Collected by Time Destination   A : right paroid mass Tissue Tissue SURGICAL PATHOLOGY Brock Hayes DO 7/25/2024 1406        Implants:  * No implants in log *      Drains: * No LDAs found *    Findings:  Infection Present At Time Of Surgery (PATOS) (choose all levels that have infection present):  No infection present  Other Findings: ~1.5 cm right tail of parotid mass    Detailed Description of Procedure:     Specimens: was Obtained, right tail of parotid mass    Indication: PT presented with a history of right parotid mass.  FNA was performed and was suspicious for pleomorphic adenoma.  The risks and benefits of the procedure were discussed including the risk of facial nerve paralysis.  Patient understands the risks and wishes to proceed.  All questions were answered.    Procedure:  The patient was consented preoperatively, taken back to the operating room, identified appropriately, placed in a supine position, given anesthesia for general intubation. Once he was intubated, the neck was turned to the left and a shoulder roll was placed under the patient's neck. Nerve integrity monitors were placed on the patient's nasolabial fold, supraorbital muscle, and then orbicularis oris muscles for monitoring of the facial nerve.   The patient was then prepped and draped in a sterile fashion with clear visualization of the patient's right lower lip and right eye. Once the patient was prepped and draped, injection of 1% lidocaine with epinephrine,  about 10 mL were made along the incision line which was on the right side of the neck. A marker was used to draw out the Modified Brody incision line starting from the inferior portion of the ear, anterior to the inferior portion of the neck, down to the area of the sternocleidomastoid and following the sternocleidomastoid anteriorly and inferiorly towards the lower half of the neck.     A 15 blade was then used to make a the incision in this area, concentrating on   the lower half of the neck because this is where the mass was actually felt.   The incision was taken down through the subcuticular layer, down through the   small areas of the platysma into the area of the parotid and  the sternocleidomastoid. The sternocleidomastoid muscle was found and the anterior border of it was found and dissected away from the parotid gland itself. A skin flap  the parotid fascia from the subcutaneous tissue was then dissected out to expose the area where the mass was palpated to be. Great auricular nerve was found and dissected out.   All attempts were made to try to save it and it was not sacrificed during the procedure.     Going in the lower half of the parotid tail inferiorly, the mass started to present itself. The mass was found to be in its own plane, in its own capsule. This capsule was dissected out and taken for an inferior to superior-posterior direction leaving a cuff of normal parotid tissue around the mass. The entire mass seemed to be solid, did not   have any fluctuance to it. Using the harmonic scalpel and blunt dissection as well as with peanuts, the mass was totally dissected from the parotid itself. Minimal bleeding was seen and this was controlled with bipolar cautery. The patient's nerve integrity monitor noted any signs that the surgical area was near the facial nerve, especially the marginal mandibular area and this should have been left intact and was not encountered. The mass was sent off for  pathology.     The wound was irrigated out.  There was no bleeding seen. The subcutaneous wound was closed with  3-0 Vicryl  and the skin was closed with   4-0 Monocryl .  Wound was dressed with Mastisol and Steri-Strips.  A Jobst dressing was placed. The patient was returned to Anesthesia for appropriate awakening.    Dr. Hayes  was present and participated in all critical portions of the procedure.    Electronically signed by Raúl Jones DO on 7/25/2024 at 3:10 PM

## 2024-08-02 LAB — SURGICAL PATHOLOGY REPORT: NORMAL

## 2024-08-08 ENCOUNTER — TELEPHONE (OUTPATIENT)
Dept: ENT CLINIC | Age: 59
End: 2024-08-08

## 2024-08-08 NOTE — TELEPHONE ENCOUNTER
Patient [facility] requesting return call to [phone]. Patient had right parotidectomy with Dr. Hayes 7/25/24 and is concerned about his ear pain/swelling. Patient scheduled for his post op 8/14/24.    Electronically signed by Lia Brooke MA on 8/8/24 at 3:52 PM EDT

## 2024-08-14 ENCOUNTER — OFFICE VISIT (OUTPATIENT)
Dept: ENT CLINIC | Age: 59
End: 2024-08-14

## 2024-08-14 VITALS
HEART RATE: 71 BPM | DIASTOLIC BLOOD PRESSURE: 75 MMHG | TEMPERATURE: 99.3 F | HEIGHT: 68 IN | WEIGHT: 168 LBS | OXYGEN SATURATION: 98 % | BODY MASS INDEX: 25.46 KG/M2 | SYSTOLIC BLOOD PRESSURE: 121 MMHG

## 2024-08-14 DIAGNOSIS — D11.0 PLEOMORPHIC ADENOMA OF PAROTID GLAND: ICD-10-CM

## 2024-08-14 DIAGNOSIS — K11.8 PAROTID MASS: Primary | ICD-10-CM

## 2024-08-14 PROCEDURE — 99024 POSTOP FOLLOW-UP VISIT: CPT | Performed by: OTOLARYNGOLOGY

## 2024-08-14 NOTE — PROGRESS NOTES
Subjective:      Patient ID:  Walker Edwards is a 58 y.o. male.    HPI:  here for post op parotid surgery 2 week(s) ago.  There are not changes since last visit.     Past Medical History:   Diagnosis Date    Enlarged parotid gland     right     Past Surgical History:   Procedure Laterality Date    COLONOSCOPY      PAROTIDECTOMY Right 7/25/2024    RIGHT SUPERFICIAL PAROTIDECTOMY performed by Brock Hayes DO at [facility] OR     History reviewed. No pertinent family history.  Social History     Socioeconomic History    Marital status:      Spouse name: None    Number of children: None    Years of education: None    Highest education level: None   Tobacco Use    Smoking status: Never    Smokeless tobacco: Never   Vaping Use    Vaping status: Never Used   Substance and Sexual Activity    Alcohol use: Yes     Comment: social    Drug use: Never     Social Determinants of Health     Financial Resource Strain: Low Risk  (10/27/2023)    Overall Financial Resource Strain (CARDIA)     Difficulty of Paying Living Expenses: Not hard at all   Transportation Needs: Unknown (10/27/2023)    PRAPARE - Transportation     Lack of Transportation (Non-Medical): No   Physical Activity: Sufficiently Active (9/20/2022)    Exercise Vital Sign     Days of Exercise per Week: 4 days     Minutes of Exercise per Session: 60 min   Intimate Partner Violence: Not At Risk (9/20/2022)    Humiliation, Afraid, Rape, and Kick questionnaire     Fear of Current or Ex-Partner: No     Emotionally Abused: No     Physically Abused: No     Sexually Abused: No   Housing Stability: Unknown (10/27/2023)    Housing Stability Vital Sign     Unstable Housing in the Last Year: No     No Known Allergies        Review of Systems   Constitutional: Negative.  Negative for appetite change, chills, fever and unexpected weight change.   HENT:  Negative for sore throat and trouble swallowing.    Eyes: Negative.  Negative for pain, discharge and visual

## 2024-10-17 DIAGNOSIS — E78.00 PURE HYPERCHOLESTEROLEMIA: ICD-10-CM

## 2024-10-17 RX ORDER — VITAMIN B COMPLEX
TABLET ORAL
Qty: 90 CAPSULE | Refills: 0 | Status: SHIPPED | OUTPATIENT
Start: 2024-10-17

## 2024-10-17 NOTE — TELEPHONE ENCOUNTER
Name of Medication(s) Requested:  Requested Prescriptions     Pending Prescriptions Disp Refills    Coenzyme Q10 (COQ10) 100 MG CAPS [Pharmacy Med Name: Co Q-10 100 MG Oral Capsule] 90 capsule 0     Sig: TAKE 1 (100MG) CAPSULE BY MOUTH ONCE DAILY       Medication is on current medication list Yes    Dosage and directions were verified? Yes    Quantity verified: 90 day supply     Pharmacy Verified?  Yes    Last Appointment:  9/26/2022    Future appts:  Future Appointments   Date Time Provider Department Center   10/28/2024  8:00 AM Cristian Echeverria DO [facility] DEP        (If no appt send self scheduling link. .REFILLAPPT)  Scheduling request sent?     [] Yes  [x] No    Does patient need updated?  [] Yes  [x] No

## 2024-10-28 ENCOUNTER — OFFICE VISIT (OUTPATIENT)
Dept: FAMILY MEDICINE CLINIC | Age: 59
End: 2024-10-28
Payer: COMMERCIAL

## 2024-10-28 VITALS
TEMPERATURE: 97.5 F | DIASTOLIC BLOOD PRESSURE: 68 MMHG | SYSTOLIC BLOOD PRESSURE: 128 MMHG | WEIGHT: 171 LBS | HEART RATE: 61 BPM | BODY MASS INDEX: 26 KG/M2 | OXYGEN SATURATION: 99 %

## 2024-10-28 DIAGNOSIS — E78.00 PURE HYPERCHOLESTEROLEMIA: ICD-10-CM

## 2024-10-28 DIAGNOSIS — Z00.00 ENCOUNTER FOR WELL ADULT EXAM WITHOUT ABNORMAL FINDINGS: Primary | ICD-10-CM

## 2024-10-28 DIAGNOSIS — Z79.899 ENCOUNTER FOR MONITORING STATIN THERAPY: ICD-10-CM

## 2024-10-28 DIAGNOSIS — Z12.5 SCREENING FOR PROSTATE CANCER: ICD-10-CM

## 2024-10-28 DIAGNOSIS — L64.9 ANDROGENIC ALOPECIA: ICD-10-CM

## 2024-10-28 DIAGNOSIS — Z13.6 SCREENING FOR HEART DISEASE: ICD-10-CM

## 2024-10-28 DIAGNOSIS — Z82.49 FAMILY HISTORY OF HEART DISEASE: ICD-10-CM

## 2024-10-28 DIAGNOSIS — Z86.018 HISTORY OF PLEOMORPHIC ADENOMA OF SALIVARY GLAND: ICD-10-CM

## 2024-10-28 DIAGNOSIS — Z23 NEED FOR IMMUNIZATION AGAINST INFLUENZA: ICD-10-CM

## 2024-10-28 DIAGNOSIS — Z51.81 ENCOUNTER FOR MONITORING STATIN THERAPY: ICD-10-CM

## 2024-10-28 LAB
ALBUMIN: 4.8 G/DL (ref 3.5–5.2)
ALP BLD-CCNC: 76 U/L (ref 40–129)
ALT SERPL-CCNC: 21 U/L (ref 0–40)
ANION GAP SERPL CALCULATED.3IONS-SCNC: 11 MMOL/L (ref 7–16)
AST SERPL-CCNC: 29 U/L (ref 0–39)
BASOPHILS ABSOLUTE: 0.06 K/UL (ref 0–0.2)
BASOPHILS RELATIVE PERCENT: 1 % (ref 0–2)
BILIRUB SERPL-MCNC: 0.9 MG/DL (ref 0–1.2)
BUN BLDV-MCNC: 18 MG/DL (ref 6–20)
CALCIUM SERPL-MCNC: 9.6 MG/DL (ref 8.6–10.2)
CHLORIDE BLD-SCNC: 103 MMOL/L (ref 98–107)
CHOLESTEROL, FASTING: 168 MG/DL
CO2: 27 MMOL/L (ref 22–29)
CREAT SERPL-MCNC: 1.2 MG/DL (ref 0.7–1.2)
EOSINOPHILS ABSOLUTE: 0.09 K/UL (ref 0.05–0.5)
EOSINOPHILS RELATIVE PERCENT: 2 % (ref 0–6)
GFR, ESTIMATED: 72 ML/MIN/1.73M2
GLUCOSE BLD-MCNC: 99 MG/DL (ref 74–99)
HCT VFR BLD CALC: 46 % (ref 37–54)
HDLC SERPL-MCNC: 41 MG/DL
HEMOGLOBIN: 14.7 G/DL (ref 12.5–16.5)
IMMATURE GRANULOCYTES %: 0 % (ref 0–5)
IMMATURE GRANULOCYTES ABSOLUTE: <0.03 K/UL (ref 0–0.58)
LDL CHOLESTEROL: 109 MG/DL
LYMPHOCYTES ABSOLUTE: 1.9 K/UL (ref 1.5–4)
LYMPHOCYTES RELATIVE PERCENT: 37 % (ref 20–42)
MCH RBC QN AUTO: 28.5 PG (ref 26–35)
MCHC RBC AUTO-ENTMCNC: 32 G/DL (ref 32–34.5)
MCV RBC AUTO: 89.1 FL (ref 80–99.9)
MONOCYTES ABSOLUTE: 0.45 K/UL (ref 0.1–0.95)
MONOCYTES RELATIVE PERCENT: 9 % (ref 2–12)
NEUTROPHILS ABSOLUTE: 2.57 K/UL (ref 1.8–7.3)
NEUTROPHILS RELATIVE PERCENT: 51 % (ref 43–80)
PDW BLD-RTO: 12.4 % (ref 11.5–15)
PLATELET # BLD: 215 K/UL (ref 130–450)
PMV BLD AUTO: 12.5 FL (ref 7–12)
POTASSIUM SERPL-SCNC: 4.6 MMOL/L (ref 3.5–5)
PROSTATE SPECIFIC ANTIGEN: 1.01 NG/ML (ref 0–4)
RBC # BLD: 5.16 M/UL (ref 3.8–5.8)
SODIUM BLD-SCNC: 141 MMOL/L (ref 132–146)
TOTAL PROTEIN: 7.1 G/DL (ref 6.4–8.3)
TRIGLYCERIDE, FASTING: 91 MG/DL
VLDLC SERPL CALC-MCNC: 18 MG/DL
WBC # BLD: 5.1 K/UL (ref 4.5–11.5)

## 2024-10-28 PROCEDURE — 99396 PREV VISIT EST AGE 40-64: CPT | Performed by: SURGERY

## 2024-10-28 PROCEDURE — 90471 IMMUNIZATION ADMIN: CPT | Performed by: SURGERY

## 2024-10-28 PROCEDURE — 90661 CCIIV3 VAC ABX FR 0.5 ML IM: CPT | Performed by: SURGERY

## 2024-10-28 SDOH — ECONOMIC STABILITY: FOOD INSECURITY: WITHIN THE PAST 12 MONTHS, THE FOOD YOU BOUGHT JUST DIDN'T LAST AND YOU DIDN'T HAVE MONEY TO GET MORE.: NEVER TRUE

## 2024-10-28 SDOH — ECONOMIC STABILITY: INCOME INSECURITY: HOW HARD IS IT FOR YOU TO PAY FOR THE VERY BASICS LIKE FOOD, HOUSING, MEDICAL CARE, AND HEATING?: NOT HARD AT ALL

## 2024-10-28 SDOH — ECONOMIC STABILITY: FOOD INSECURITY: WITHIN THE PAST 12 MONTHS, YOU WORRIED THAT YOUR FOOD WOULD RUN OUT BEFORE YOU GOT MONEY TO BUY MORE.: NEVER TRUE

## 2024-10-28 ASSESSMENT — ANXIETY QUESTIONNAIRES
6. BECOMING EASILY ANNOYED OR IRRITABLE: NOT AT ALL
4. TROUBLE RELAXING: NOT AT ALL
5. BEING SO RESTLESS THAT IT IS HARD TO SIT STILL: NOT AT ALL
IF YOU CHECKED OFF ANY PROBLEMS ON THIS QUESTIONNAIRE, HOW DIFFICULT HAVE THESE PROBLEMS MADE IT FOR YOU TO DO YOUR WORK, TAKE CARE OF THINGS AT HOME, OR GET ALONG WITH OTHER PEOPLE: NOT DIFFICULT AT ALL
3. WORRYING TOO MUCH ABOUT DIFFERENT THINGS: NOT AT ALL
1. FEELING NERVOUS, ANXIOUS, OR ON EDGE: NOT AT ALL
2. NOT BEING ABLE TO STOP OR CONTROL WORRYING: NOT AT ALL
GAD7 TOTAL SCORE: 0
7. FEELING AFRAID AS IF SOMETHING AWFUL MIGHT HAPPEN: NOT AT ALL

## 2024-10-28 ASSESSMENT — PATIENT HEALTH QUESTIONNAIRE - PHQ9
2. FEELING DOWN, DEPRESSED OR HOPELESS: NOT AT ALL
SUM OF ALL RESPONSES TO PHQ9 QUESTIONS 1 & 2: 0
SUM OF ALL RESPONSES TO PHQ QUESTIONS 1-9: 0
1. LITTLE INTEREST OR PLEASURE IN DOING THINGS: NOT AT ALL
SUM OF ALL RESPONSES TO PHQ QUESTIONS 1-9: 0

## 2024-10-28 NOTE — PROGRESS NOTES
mL 12/28/2022    COVID-19, PFIZER PURPLE top, DILUTE for use, (age 12 y+), 30mcg/0.3mL 12/27/2021    COVID-19, [place] Vaccine, Vaccine Unspecified 04/01/2021, 04/29/2021    Influenza, FLUARIX, FLULAVAL, FLUZONE (age 6 mo+) and AFLURIA, (age 3 y+), Quadv PF, 0.5mL 12/28/2022    Influenza, FLUCELVAX, (age 6 mo+) IM, Trivalent PF, 0.5mL 10/28/2024    Influenza, FLUCELVAX, (age 6 mo+), MDCK, Quadv PF, 0.5mL 10/27/2023    TDaP, ADACEL (age 10y-64y), BOOSTRIX (age 10y+), IM, 0.5mL 09/21/2022        Health Maintenance Due   Topic Date Due    Hepatitis B vaccine (1 of 3 - 19+ 3-dose series) Never done    Shingles vaccine (1 of 2) Never done    COVID-19 Vaccine (7 - 2023-24 season) 09/01/2024     Recommendations for Preventive Services Due: see orders and patient instructions/AVS.

## 2024-10-28 NOTE — PATIENT INSTRUCTIONS
IPSS (International Prostate Symptom Score)  [email or web address]  Use the above calculator to determine prostate score, if moving from mild to moderate/severe or moderate to severe  We will continue to monitor the prostate number in the blood stream  Of course, we always want to do prostate examination and will defer to your preference on this.       Coronary Calcium Scoring is a convenient and non-invasive way of evaluating whether you may be at an increased risk for a heart attack. Using the CT, information is obtained about the presence, location and extent of calcified plaque in the coronary arteries - the vessels that supply oxygen containing blood to the heart muscle. This disease of the vessel wall is also known as Coronary Artery Disease (CAD). The goal is to determine if CAD is present and to what extent, even if there are no symptoms. The entire procedure including the actual CT scanning is completed in about 15 minutes and requires no injection of contrast material.       Who should get screened?    Family history of heart disease  High cholesterol  High blood pressure  Smoking  Lack of physical activity  Older than age 55  Diabetic  Overweight  Postmenopausal women  Reduced Price $79  Fee must be paid at the time of the exam. * Screening is not covered by insurance.    THE CORONARY ARTERY CALCIUM SCORING IS OFFERED AT:    [place] [address] [facility] [place]  Phone: [phone]  Fax: [phone]    Once you have your physician order, call to schedule your appointment at [phone].       Well Visit, Ages 18 to 65: Care Instructions  Well visits can help you stay healthy. Your doctor has checked your overall health and may have suggested ways to take good care of yourself. Your doctor also may have recommended tests. You can help prevent illness with healthy eating, good sleep, vaccinations, regular

## 2024-11-26 ENCOUNTER — APPOINTMENT (OUTPATIENT)
Dept: GENERAL RADIOLOGY | Age: 59
DRG: 958 | End: 2024-11-26
Payer: COMMERCIAL

## 2024-11-26 ENCOUNTER — APPOINTMENT (OUTPATIENT)
Dept: CT IMAGING | Age: 59
DRG: 958 | End: 2024-11-26
Payer: COMMERCIAL

## 2024-11-26 ENCOUNTER — HOSPITAL ENCOUNTER (INPATIENT)
Age: 59
LOS: 15 days | Discharge: NURSING, REHAB OR LONG-TERM CARE | DRG: 958 | End: 2024-12-11
Attending: STUDENT IN AN ORGANIZED HEALTH CARE EDUCATION/TRAINING PROGRAM | Admitting: SURGERY
Payer: COMMERCIAL

## 2024-11-26 DIAGNOSIS — S52.609B: ICD-10-CM

## 2024-11-26 DIAGNOSIS — T07.XXXA MULTIPLE TRAUMA: ICD-10-CM

## 2024-11-26 DIAGNOSIS — S32.402A CLOSED DISPLACED FRACTURE OF LEFT ACETABULUM, UNSPECIFIED PORTION OF ACETABULUM, INITIAL ENCOUNTER (HCC): Primary | ICD-10-CM
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
## 2024-11-26 LAB
AADO2: 326.6 MMHG
ALBUMIN SERPL-MCNC: 4.3 G/DL (ref 3.5–5.2)
ALP SERPL-CCNC: 84 U/L (ref 40–129)
ALT SERPL-CCNC: 29 U/L (ref 0–40)
AMPHET UR QL SCN: NEGATIVE
ANION GAP SERPL CALCULATED.3IONS-SCNC: 16 MMOL/L (ref 7–16)
APAP SERPL-MCNC: <5 UG/ML (ref 10–30)
AST SERPL-CCNC: 37 U/L (ref 0–39)
B.E.: -0.6 MMOL/L (ref -3–3)
BARBITURATES UR QL SCN: NEGATIVE
BENZODIAZ UR QL: NEGATIVE
BILIRUB SERPL-MCNC: 0.6 MG/DL (ref 0–1.2)
BUN SERPL-MCNC: 19 MG/DL (ref 6–20)
BUPRENORPHINE UR QL: NEGATIVE
CALCIUM SERPL-MCNC: 9.7 MG/DL (ref 8.6–10.2)
CANNABINOIDS UR QL SCN: NEGATIVE
CHLORIDE SERPL-SCNC: 104 MMOL/L (ref 98–107)
CLOT ANGLE.KAOLIN INDUCED BLD RES TEG: 77.4 DEG (ref 53–70)
CO2 SERPL-SCNC: 19 MMOL/L (ref 22–29)
COCAINE UR QL SCN: NEGATIVE
COHB: 0.3 % (ref 0–1.5)
CREAT SERPL-MCNC: 1.1 MG/DL (ref 0.7–1.2)
CRITICAL: ABNORMAL
DATE ANALYZED: ABNORMAL
DATE OF COLLECTION: ABNORMAL
EPL-TEG: 1.4 % (ref 0–15)
ERYTHROCYTE [DISTWIDTH] IN BLOOD BY AUTOMATED COUNT: 12.1 % (ref 11.5–15)
ETHANOLAMINE SERPL-MCNC: <10 MG/DL (ref 0–0.08)
ETHANOLAMINE SERPL-MCNC: <10 MG/DL (ref 0–0.08)
FENTANYL UR QL: NEGATIVE
FIO2: 100 %
G-TEG: 11.9 KDYN/CM2 (ref 4.5–11)
GFR, ESTIMATED: 81 ML/MIN/1.73M2
GLUCOSE SERPL-MCNC: 151 MG/DL (ref 74–99)
HCO3: 18.5 MMOL/L (ref 22–26)
HCT VFR BLD AUTO: 40.8 % (ref 37–54)
HGB BLD-MCNC: 13.9 G/DL (ref 12.5–16.5)
HHB: 0.6 % (ref 0–5)
INR PPP: 0.9
KINETICS TEG: 0.8 MIN (ref 1–3)
LAB: ABNORMAL
LACTATE BLDV-SCNC: 1.4 MMOL/L (ref 0.5–2.2)
LACTATE BLDV-SCNC: 4.1 MMOL/L (ref 0.5–2.2)
LACTATE BLDV-SCNC: 4.2 MMOL/L (ref 0.5–2.2)
LY30 (LYSIS) TEG: 1.4 % (ref 0–8)
Lab: 1144
MA (MAX CLOT) TEG: 70.4 MM (ref 50–70)
MCH RBC QN AUTO: 29.1 PG (ref 26–35)
MCHC RBC AUTO-ENTMCNC: 34.1 G/DL (ref 32–34.5)
MCV RBC AUTO: 85.5 FL (ref 80–99.9)
METHADONE UR QL: NEGATIVE
METHB: 0.2 % (ref 0–1.5)
MODE: ABNORMAL
O2 SATURATION: 99.4 % (ref 92–98.5)
O2HB: 98.9 % (ref 94–97)
OPERATOR ID: 8214
OPIATES UR QL SCN: NEGATIVE
OXYCODONE UR QL SCN: NEGATIVE
PARTIAL THROMBOPLASTIN TIME: 21.3 SEC (ref 24.5–35.1)
PATIENT TEMP: 37 C
PCO2: 19.7 MMHG (ref 35–45)
PCP UR QL SCN: NEGATIVE
PFO2: 3.42 MMHG/%
PH BLOOD GAS: 7.59 (ref 7.35–7.45)
PLATELET # BLD AUTO: 229 K/UL (ref 130–450)
PMV BLD AUTO: 12.1 FL (ref 7–12)
PO2: 341.7 MMHG (ref 75–100)
POTASSIUM SERPL-SCNC: 3.51 MMOL/L (ref 3.5–5)
POTASSIUM SERPL-SCNC: 3.6 MMOL/L (ref 3.5–5)
PROT SERPL-MCNC: 6.5 G/DL (ref 6.4–8.3)
PROTHROMBIN TIME: 10.2 SEC (ref 9.3–12.4)
RBC # BLD AUTO: 4.77 M/UL (ref 3.8–5.8)
REACTION TIME TEG: 2.1 MIN (ref 5–10)
RI(T): 0.96
SALICYLATES SERPL-MCNC: <0.3 MG/DL (ref 0–30)
SODIUM SERPL-SCNC: 139 MMOL/L (ref 132–146)
SOURCE, BLOOD GAS: ABNORMAL
TEST INFORMATION: NORMAL
THB: 14.9 G/DL (ref 11.5–16.5)
TIME ANALYZED: 1145
TOXIC TRICYCLIC SC,BLOOD: NEGATIVE
WBC OTHER # BLD: 12.5 K/UL (ref 4.5–11.5)

## 2024-11-26 PROCEDURE — 80053 COMPREHEN METABOLIC PANEL: CPT

## 2024-11-26 PROCEDURE — 70450 CT HEAD/BRAIN W/O DYE: CPT

## 2024-11-26 PROCEDURE — 85576 BLOOD PLATELET AGGREGATION: CPT

## 2024-11-26 PROCEDURE — 85730 THROMBOPLASTIN TIME PARTIAL: CPT

## 2024-11-26 PROCEDURE — 71045 X-RAY EXAM CHEST 1 VIEW: CPT

## 2024-11-26 PROCEDURE — 99285 EMERGENCY DEPT VISIT HI MDM: CPT

## 2024-11-26 PROCEDURE — 73200 CT UPPER EXTREMITY W/O DYE: CPT

## 2024-11-26 PROCEDURE — 73100 X-RAY EXAM OF WRIST: CPT

## 2024-11-26 PROCEDURE — 85390 FIBRINOLYSINS SCREEN I&R: CPT

## 2024-11-26 PROCEDURE — 83605 ASSAY OF LACTIC ACID: CPT

## 2024-11-26 PROCEDURE — 86850 RBC ANTIBODY SCREEN: CPT

## 2024-11-26 PROCEDURE — 36415 COLL VENOUS BLD VENIPUNCTURE: CPT

## 2024-11-26 PROCEDURE — 85027 COMPLETE CBC AUTOMATED: CPT

## 2024-11-26 PROCEDURE — 74177 CT ABD & PELVIS W/CONTRAST: CPT

## 2024-11-26 PROCEDURE — 80143 DRUG ASSAY ACETAMINOPHEN: CPT

## 2024-11-26 PROCEDURE — 73090 X-RAY EXAM OF FOREARM: CPT

## 2024-11-26 PROCEDURE — 76376 3D RENDER W/INTRP POSTPROCES: CPT

## 2024-11-26 PROCEDURE — 84132 ASSAY OF SERUM POTASSIUM: CPT

## 2024-11-26 PROCEDURE — 80307 DRUG TEST PRSMV CHEM ANLYZR: CPT

## 2024-11-26 PROCEDURE — 6360000004 HC RX CONTRAST MEDICATION: Performed by: RADIOLOGY

## 2024-11-26 PROCEDURE — 6370000000 HC RX 637 (ALT 250 FOR IP)

## 2024-11-26 PROCEDURE — 73070 X-RAY EXAM OF ELBOW: CPT

## 2024-11-26 PROCEDURE — 72125 CT NECK SPINE W/O DYE: CPT

## 2024-11-26 PROCEDURE — 86901 BLOOD TYPING SEROLOGIC RH(D): CPT

## 2024-11-26 PROCEDURE — 6360000002 HC RX W HCPCS: Performed by: STUDENT IN AN ORGANIZED HEALTH CARE EDUCATION/TRAINING PROGRAM

## 2024-11-26 PROCEDURE — 2500000003 HC RX 250 WO HCPCS

## 2024-11-26 PROCEDURE — 85384 FIBRINOGEN ACTIVITY: CPT

## 2024-11-26 PROCEDURE — 86900 BLOOD TYPING SEROLOGIC ABO: CPT

## 2024-11-26 PROCEDURE — 2500000003 HC RX 250 WO HCPCS: Performed by: STUDENT IN AN ORGANIZED HEALTH CARE EDUCATION/TRAINING PROGRAM

## 2024-11-26 PROCEDURE — 99223 1ST HOSP IP/OBS HIGH 75: CPT | Performed by: SURGERY

## 2024-11-26 PROCEDURE — 71260 CT THORAX DX C+: CPT

## 2024-11-26 PROCEDURE — 72170 X-RAY EXAM OF PELVIS: CPT

## 2024-11-26 PROCEDURE — 96374 THER/PROPH/DIAG INJ IV PUSH: CPT

## 2024-11-26 PROCEDURE — 6360000002 HC RX W HCPCS

## 2024-11-26 PROCEDURE — 72190 X-RAY EXAM OF PELVIS: CPT

## 2024-11-26 PROCEDURE — 36600 WITHDRAWAL OF ARTERIAL BLOOD: CPT | Performed by: SURGERY

## 2024-11-26 PROCEDURE — 6810039001 HC L1 TRAUMA PRIORITY

## 2024-11-26 PROCEDURE — 1200000000 HC SEMI PRIVATE

## 2024-11-26 PROCEDURE — 25605 CLTX DST RDL FX/EPHYS SEP W/: CPT

## 2024-11-26 PROCEDURE — 80179 DRUG ASSAY SALICYLATE: CPT

## 2024-11-26 PROCEDURE — 99152 MOD SED SAME PHYS/QHP 5/>YRS: CPT

## 2024-11-26 PROCEDURE — 2580000003 HC RX 258: Performed by: STUDENT IN AN ORGANIZED HEALTH CARE EDUCATION/TRAINING PROGRAM

## 2024-11-26 PROCEDURE — 85610 PROTHROMBIN TIME: CPT

## 2024-11-26 PROCEDURE — 86923 COMPATIBILITY TEST ELECTRIC: CPT

## 2024-11-26 PROCEDURE — 2580000003 HC RX 258

## 2024-11-26 PROCEDURE — 85347 COAGULATION TIME ACTIVATED: CPT

## 2024-11-26 PROCEDURE — 82805 BLOOD GASES W/O2 SATURATION: CPT

## 2024-11-26 PROCEDURE — 99223 1ST HOSP IP/OBS HIGH 75: CPT | Performed by: ORTHOPAEDIC SURGERY

## 2024-11-26 PROCEDURE — G0480 DRUG TEST DEF 1-7 CLASSES: HCPCS

## 2024-11-26 RX ORDER — HYDROMORPHONE HYDROCHLORIDE 1 MG/ML
0.5 INJECTION, SOLUTION INTRAMUSCULAR; INTRAVENOUS; SUBCUTANEOUS
Status: DISCONTINUED | OUTPATIENT
Start: 2024-11-26 | End: 2024-12-09

## 2024-11-26 RX ORDER — ACETAMINOPHEN 325 MG/1
650 TABLET ORAL EVERY 6 HOURS
Status: DISCONTINUED | OUTPATIENT
Start: 2024-11-26 | End: 2024-12-11 | Stop reason: HOSPADM

## 2024-11-26 RX ORDER — ONDANSETRON 4 MG/1
4 TABLET, ORALLY DISINTEGRATING ORAL EVERY 8 HOURS PRN
Status: DISCONTINUED | OUTPATIENT
Start: 2024-11-26 | End: 2024-12-11 | Stop reason: HOSPADM

## 2024-11-26 RX ORDER — SODIUM CHLORIDE 0.9 % (FLUSH) 0.9 %
10 SYRINGE (ML) INJECTION PRN
Status: DISCONTINUED | OUTPATIENT
Start: 2024-11-26 | End: 2024-12-11 | Stop reason: HOSPADM

## 2024-11-26 RX ORDER — PROPOFOL 10 MG/ML
INJECTION, EMULSION INTRAVENOUS CONTINUOUS PRN
Status: COMPLETED | OUTPATIENT
Start: 2024-11-26 | End: 2024-11-26

## 2024-11-26 RX ORDER — SODIUM CHLORIDE, SODIUM LACTATE, POTASSIUM CHLORIDE, AND CALCIUM CHLORIDE .6; .31; .03; .02 G/100ML; G/100ML; G/100ML; G/100ML
1000 INJECTION, SOLUTION INTRAVENOUS ONCE
Status: DISCONTINUED | OUTPATIENT
Start: 2024-11-26 | End: 2024-12-11 | Stop reason: HOSPADM

## 2024-11-26 RX ORDER — FINASTERIDE 1 MG/1
1 TABLET, FILM COATED ORAL DAILY
COMMUNITY

## 2024-11-26 RX ORDER — MINOXIDIL 2.5 MG/1
0.62 TABLET ORAL DAILY
COMMUNITY

## 2024-11-26 RX ORDER — OXYCODONE HYDROCHLORIDE 10 MG/1
10 TABLET ORAL EVERY 4 HOURS PRN
Status: DISCONTINUED | OUTPATIENT
Start: 2024-11-26 | End: 2024-12-11 | Stop reason: HOSPADM

## 2024-11-26 RX ORDER — KETAMINE HYDROCHLORIDE 10 MG/ML
INJECTION, SOLUTION INTRAMUSCULAR; INTRAVENOUS DAILY PRN
Status: COMPLETED | OUTPATIENT
Start: 2024-11-26 | End: 2024-11-26

## 2024-11-26 RX ORDER — IOPAMIDOL 755 MG/ML
75 INJECTION, SOLUTION INTRAVASCULAR
Status: COMPLETED | OUTPATIENT
Start: 2024-11-26 | End: 2024-11-26

## 2024-11-26 RX ORDER — POLYETHYLENE GLYCOL 3350 17 G/17G
17 POWDER, FOR SOLUTION ORAL DAILY PRN
Status: DISCONTINUED | OUTPATIENT
Start: 2024-11-26 | End: 2024-12-01

## 2024-11-26 RX ORDER — SODIUM CHLORIDE, SODIUM LACTATE, POTASSIUM CHLORIDE, CALCIUM CHLORIDE 600; 310; 30; 20 MG/100ML; MG/100ML; MG/100ML; MG/100ML
INJECTION, SOLUTION INTRAVENOUS CONTINUOUS
Status: DISCONTINUED | OUTPATIENT
Start: 2024-11-26 | End: 2024-11-28

## 2024-11-26 RX ORDER — SODIUM CHLORIDE 9 MG/ML
INJECTION, SOLUTION INTRAVENOUS PRN
Status: DISCONTINUED | OUTPATIENT
Start: 2024-11-26 | End: 2024-12-11 | Stop reason: HOSPADM

## 2024-11-26 RX ORDER — HYDROMORPHONE HYDROCHLORIDE 1 MG/ML
0.25 INJECTION, SOLUTION INTRAMUSCULAR; INTRAVENOUS; SUBCUTANEOUS
Status: DISCONTINUED | OUTPATIENT
Start: 2024-11-26 | End: 2024-12-09

## 2024-11-26 RX ORDER — SODIUM CHLORIDE 0.9 % (FLUSH) 0.9 %
10 SYRINGE (ML) INJECTION EVERY 12 HOURS SCHEDULED
Status: DISCONTINUED | OUTPATIENT
Start: 2024-11-26 | End: 2024-12-11 | Stop reason: HOSPADM

## 2024-11-26 RX ORDER — ONDANSETRON 2 MG/ML
4 INJECTION INTRAMUSCULAR; INTRAVENOUS EVERY 6 HOURS PRN
Status: DISCONTINUED | OUTPATIENT
Start: 2024-11-26 | End: 2024-12-11 | Stop reason: HOSPADM

## 2024-11-26 RX ORDER — MECOBALAMIN 5000 MCG
5 TABLET,DISINTEGRATING ORAL NIGHTLY
Status: DISCONTINUED | OUTPATIENT
Start: 2024-11-26 | End: 2024-12-11 | Stop reason: HOSPADM

## 2024-11-26 RX ORDER — METHOCARBAMOL 500 MG/1
1000 TABLET, FILM COATED ORAL 4 TIMES DAILY
Status: DISCONTINUED | OUTPATIENT
Start: 2024-11-26 | End: 2024-12-09

## 2024-11-26 RX ORDER — OXYCODONE HYDROCHLORIDE 5 MG/1
5 TABLET ORAL EVERY 4 HOURS PRN
Status: DISCONTINUED | OUTPATIENT
Start: 2024-11-26 | End: 2024-12-11 | Stop reason: HOSPADM

## 2024-11-26 RX ADMIN — IOPAMIDOL 75 ML: 755 INJECTION, SOLUTION INTRAVENOUS at 12:45

## 2024-11-26 RX ADMIN — METHOCARBAMOL TABLETS 1000 MG: 500 TABLET, COATED ORAL at 20:48

## 2024-11-26 RX ADMIN — Medication 5 MG: at 22:55

## 2024-11-26 RX ADMIN — SODIUM CHLORIDE, PRESERVATIVE FREE 10 ML: 5 INJECTION INTRAVENOUS at 15:47

## 2024-11-26 RX ADMIN — PROPOFOL 30 MG: 10 INJECTION, EMULSION INTRAVENOUS at 11:52

## 2024-11-26 RX ADMIN — ACETAMINOPHEN 650 MG: 325 TABLET ORAL at 15:51

## 2024-11-26 RX ADMIN — ONDANSETRON 4 MG: 2 INJECTION INTRAMUSCULAR; INTRAVENOUS at 18:12

## 2024-11-26 RX ADMIN — CEFAZOLIN 2000 MG: 2 INJECTION, POWDER, FOR SOLUTION INTRAMUSCULAR; INTRAVENOUS at 11:47

## 2024-11-26 RX ADMIN — SODIUM CHLORIDE, POTASSIUM CHLORIDE, SODIUM LACTATE AND CALCIUM CHLORIDE: 600; 310; 30; 20 INJECTION, SOLUTION INTRAVENOUS at 15:49

## 2024-11-26 RX ADMIN — OXYCODONE HYDROCHLORIDE 10 MG: 10 TABLET ORAL at 20:48

## 2024-11-26 RX ADMIN — ACETAMINOPHEN 650 MG: 325 TABLET ORAL at 20:48

## 2024-11-26 RX ADMIN — PROPOFOL 10 MG: 10 INJECTION, EMULSION INTRAVENOUS at 11:54

## 2024-11-26 RX ADMIN — HYDROMORPHONE HYDROCHLORIDE 0.5 MG: 1 INJECTION, SOLUTION INTRAMUSCULAR; INTRAVENOUS; SUBCUTANEOUS at 18:10

## 2024-11-26 RX ADMIN — METHOCARBAMOL TABLETS 1000 MG: 500 TABLET, COATED ORAL at 15:52

## 2024-11-26 RX ADMIN — PROPOFOL 10 MG: 10 INJECTION, EMULSION INTRAVENOUS at 11:55

## 2024-11-26 RX ADMIN — Medication 25 MG: at 11:51

## 2024-11-26 ASSESSMENT — PAIN DESCRIPTION - ORIENTATION
ORIENTATION: LEFT

## 2024-11-26 ASSESSMENT — PAIN SCALES - GENERAL
PAINLEVEL_OUTOF10: 7
PAINLEVEL_OUTOF10: 4

## 2024-11-26 ASSESSMENT — PAIN DESCRIPTION - DESCRIPTORS: DESCRIPTORS: ACHING;DISCOMFORT;SORE

## 2024-11-26 ASSESSMENT — PAIN DESCRIPTION - ONSET: ONSET: ON-GOING

## 2024-11-26 ASSESSMENT — PAIN DESCRIPTION - LOCATION
LOCATION: ARM
LOCATION: ARM
LOCATION: ARM;HIP

## 2024-11-26 ASSESSMENT — PAIN - FUNCTIONAL ASSESSMENT: PAIN_FUNCTIONAL_ASSESSMENT: PREVENTS OR INTERFERES SOME ACTIVE ACTIVITIES AND ADLS

## 2024-11-26 ASSESSMENT — PAIN DESCRIPTION - FREQUENCY: FREQUENCY: CONTINUOUS

## 2024-11-26 ASSESSMENT — PAIN DESCRIPTION - PAIN TYPE: TYPE: ACUTE PAIN

## 2024-11-26 NOTE — CONSULTS
off a ladder at home.  Patient suffered a left anterior column posterior hemitransverse acetabulum fracture with comminution of his quadrilateral plate.  He has a large hematoma in his pelvis.  His pushes bladder to the opposite side.  There was also blurring of his large pelvic veins of the left.  We did go over this with vascular surgery and they think that we should wait a few days to allow more coagulation in the area which is reasonable.  He also has a grade 3A left distal radius and associated ulnar shaft fracture.  His distal radius fracture is severely comminuted and intra-articular nature.  His ulnar shaft fractures of the distal third.  Talked in detail with the fact that would like to take him today for urgent irrigation debridement and fixation.  I explained he could develop significant posttraumatic arthritis, nonunion, hardware failure etc. from this.  Also talked about the general risks of surgery including death of anesthesia, infection, wound healing issues, need for further operations, neurovascular damage, and any other unforeseen complications.  Patient was to decided proceed with operative intervention for his left upper extremity and delayed operative intervention for his left acetabulum in the next few days.    No past medical history on file.  No past surgical history on file.  No family history on file.  Vaping Use    Vaping status: Never Used     No Known Allergies      Physical Examination:   General appearance: alert, well appearing, and in no distress,  normal appearing weight. No visible signs of trauma   Mental status: alert, oriented to person, place, and time, normal mood, behavior, speech, dress, motor activity, and thought processes  Abdomen: soft, nondistended  Resp:   resp easy and unlabored, no audible wheezes note, normal symmetrical expansion of both hemithoraces  Cardiac: distal pulses palpable, skin and extremities well perfused  Neurological: alert, oriented X3, normal

## 2024-11-26 NOTE — ED NOTES
Pt logrolled to right side C-Spine maintained in neutral position  No step offs no deformities no signs of trauma, no tenderness upon palpation

## 2024-11-26 NOTE — H&P
follow up labs  - IVF  - pain control prn  - maintain cervical collar  - tertiary exam     Plan discussed with Dr. PLACIDO Romo at 11/26/2024 on 11:34 AM    Electronically signed by Maddie Lewis DO on 11/26/2024 at 11:34 AM

## 2024-11-27 ENCOUNTER — APPOINTMENT (OUTPATIENT)
Dept: GENERAL RADIOLOGY | Age: 59
DRG: 958 | End: 2024-11-27
Payer: COMMERCIAL

## 2024-11-27 ENCOUNTER — ANESTHESIA (OUTPATIENT)
Dept: OPERATING ROOM | Age: 59
End: 2024-11-27
Payer: COMMERCIAL

## 2024-11-27 ENCOUNTER — ANESTHESIA EVENT (OUTPATIENT)
Dept: OPERATING ROOM | Age: 59
End: 2024-11-27
Payer: COMMERCIAL

## 2024-11-27 ENCOUNTER — APPOINTMENT (OUTPATIENT)
Dept: MRI IMAGING | Age: 59
DRG: 958 | End: 2024-11-27
Payer: COMMERCIAL

## 2024-11-27 PROBLEM — Z12.5 SCREENING FOR PROSTATE CANCER: Status: RESOLVED | Noted: 2024-10-28 | Resolved: 2024-11-27

## 2024-11-27 LAB
ANION GAP SERPL CALCULATED.3IONS-SCNC: 9 MMOL/L (ref 7–16)
BASOPHILS # BLD: 0.03 K/UL (ref 0–0.2)
BASOPHILS NFR BLD: 0 % (ref 0–2)
BUN SERPL-MCNC: 22 MG/DL (ref 6–20)
CALCIUM SERPL-MCNC: 8.5 MG/DL (ref 8.6–10.2)
CHLORIDE SERPL-SCNC: 102 MMOL/L (ref 98–107)
CO2 SERPL-SCNC: 25 MMOL/L (ref 22–29)
CREAT SERPL-MCNC: 1.2 MG/DL (ref 0.7–1.2)
EOSINOPHIL # BLD: 0.01 K/UL (ref 0.05–0.5)
EOSINOPHILS RELATIVE PERCENT: 0 % (ref 0–6)
ERYTHROCYTE [DISTWIDTH] IN BLOOD BY AUTOMATED COUNT: 12.4 % (ref 11.5–15)
GFR, ESTIMATED: 68 ML/MIN/1.73M2
GLUCOSE SERPL-MCNC: 126 MG/DL (ref 74–99)
HCT VFR BLD AUTO: 31.9 % (ref 37–54)
HGB BLD-MCNC: 10.8 G/DL (ref 12.5–16.5)
IMM GRANULOCYTES # BLD AUTO: 0.03 K/UL (ref 0–0.58)
IMM GRANULOCYTES NFR BLD: 0 % (ref 0–5)
LYMPHOCYTES NFR BLD: 1.28 K/UL (ref 1.5–4)
LYMPHOCYTES RELATIVE PERCENT: 16 % (ref 20–42)
MCH RBC QN AUTO: 29.7 PG (ref 26–35)
MCHC RBC AUTO-ENTMCNC: 33.9 G/DL (ref 32–34.5)
MCV RBC AUTO: 87.6 FL (ref 80–99.9)
MONOCYTES NFR BLD: 0.66 K/UL (ref 0.1–0.95)
MONOCYTES NFR BLD: 8 % (ref 2–12)
NEUTROPHILS NFR BLD: 75 % (ref 43–80)
NEUTS SEG NFR BLD: 5.94 K/UL (ref 1.8–7.3)
PLATELET # BLD AUTO: 169 K/UL (ref 130–450)
PMV BLD AUTO: 12.2 FL (ref 7–12)
POTASSIUM SERPL-SCNC: 4.1 MMOL/L (ref 3.5–5)
RBC # BLD AUTO: 3.64 M/UL (ref 3.8–5.8)
SODIUM SERPL-SCNC: 136 MMOL/L (ref 132–146)
WBC OTHER # BLD: 8 K/UL (ref 4.5–11.5)

## 2024-11-27 PROCEDURE — 25609 OPTX DST RD XART FX/EP SEP3+: CPT | Performed by: ORTHOPAEDIC SURGERY

## 2024-11-27 PROCEDURE — C1729 CATH, DRAINAGE: HCPCS | Performed by: ORTHOPAEDIC SURGERY

## 2024-11-27 PROCEDURE — 6370000000 HC RX 637 (ALT 250 FOR IP)

## 2024-11-27 PROCEDURE — 11012 DEB SKIN BONE AT FX SITE: CPT | Performed by: ORTHOPAEDIC SURGERY

## 2024-11-27 PROCEDURE — 3600000007 HC SURGERY HYBRID BASE: Performed by: ORTHOPAEDIC SURGERY

## 2024-11-27 PROCEDURE — 3700000000 HC ANESTHESIA ATTENDED CARE: Performed by: ORTHOPAEDIC SURGERY

## 2024-11-27 PROCEDURE — 73221 MRI JOINT UPR EXTREM W/O DYE: CPT

## 2024-11-27 PROCEDURE — 7100000000 HC PACU RECOVERY - FIRST 15 MIN: Performed by: ORTHOPAEDIC SURGERY

## 2024-11-27 PROCEDURE — 80048 BASIC METABOLIC PNL TOTAL CA: CPT

## 2024-11-27 PROCEDURE — C1713 ANCHOR/SCREW BN/BN,TIS/BN: HCPCS | Performed by: ORTHOPAEDIC SURGERY

## 2024-11-27 PROCEDURE — P9016 RBC LEUKOCYTES REDUCED: HCPCS

## 2024-11-27 PROCEDURE — 2720000010 HC SURG SUPPLY STERILE: Performed by: ORTHOPAEDIC SURGERY

## 2024-11-27 PROCEDURE — 1200000000 HC SEMI PRIVATE

## 2024-11-27 PROCEDURE — 73110 X-RAY EXAM OF WRIST: CPT

## 2024-11-27 PROCEDURE — 2580000003 HC RX 258

## 2024-11-27 PROCEDURE — 24605 TX CLSD ELBOW DISLC REQ ANES: CPT | Performed by: ORTHOPAEDIC SURGERY

## 2024-11-27 PROCEDURE — 85025 COMPLETE CBC W/AUTO DIFF WBC: CPT

## 2024-11-27 PROCEDURE — 2500000003 HC RX 250 WO HCPCS: Performed by: NURSE ANESTHETIST, CERTIFIED REGISTERED

## 2024-11-27 PROCEDURE — 99233 SBSQ HOSP IP/OBS HIGH 50: CPT | Performed by: SURGERY

## 2024-11-27 PROCEDURE — 3600000017 HC SURGERY HYBRID ADDL 15MIN: Performed by: ORTHOPAEDIC SURGERY

## 2024-11-27 PROCEDURE — 25545 OPTX ULNAR SHFT FX INT FIXJ: CPT | Performed by: ORTHOPAEDIC SURGERY

## 2024-11-27 PROCEDURE — 7100000001 HC PACU RECOVERY - ADDTL 15 MIN: Performed by: ORTHOPAEDIC SURGERY

## 2024-11-27 PROCEDURE — 0PSL04Z REPOSITION LEFT ULNA WITH INTERNAL FIXATION DEVICE, OPEN APPROACH: ICD-10-PCS | Performed by: ORTHOPAEDIC SURGERY

## 2024-11-27 PROCEDURE — 6360000002 HC RX W HCPCS

## 2024-11-27 PROCEDURE — 36415 COLL VENOUS BLD VENIPUNCTURE: CPT

## 2024-11-27 PROCEDURE — 36430 TRANSFUSION BLD/BLD COMPNT: CPT

## 2024-11-27 PROCEDURE — 0PSJ04Z REPOSITION LEFT RADIUS WITH INTERNAL FIXATION DEVICE, OPEN APPROACH: ICD-10-PCS | Performed by: ORTHOPAEDIC SURGERY

## 2024-11-27 PROCEDURE — 3700000001 HC ADD 15 MINUTES (ANESTHESIA): Performed by: ORTHOPAEDIC SURGERY

## 2024-11-27 PROCEDURE — 30233N1 TRANSFUSION OF NONAUTOLOGOUS RED BLOOD CELLS INTO PERIPHERAL VEIN, PERCUTANEOUS APPROACH: ICD-10-PCS | Performed by: ORTHOPAEDIC SURGERY

## 2024-11-27 PROCEDURE — 2709999900 HC NON-CHARGEABLE SUPPLY: Performed by: ORTHOPAEDIC SURGERY

## 2024-11-27 PROCEDURE — 6360000002 HC RX W HCPCS: Performed by: NURSE ANESTHETIST, CERTIFIED REGISTERED

## 2024-11-27 DEVICE — SCREW BNE L20MM DIA2.4MM DST RAD VOLAR S STL ST VAR ANG LOK: Type: IMPLANTABLE DEVICE | Site: ARM | Status: FUNCTIONAL

## 2024-11-27 DEVICE — SCREW BNE L16MM DIA2.7MM CORT S STL ST T8 STARDRV RECESS: Type: IMPLANTABLE DEVICE | Site: ARM | Status: FUNCTIONAL

## 2024-11-27 DEVICE — SCREW BNE L14MM DIA2.7MM CORT S STL ST T8 STARDRV RECESS: Type: IMPLANTABLE DEVICE | Site: ARM | Status: FUNCTIONAL

## 2024-11-27 DEVICE — IMPLANTABLE DEVICE: Type: IMPLANTABLE DEVICE | Site: ARM | Status: FUNCTIONAL

## 2024-11-27 RX ORDER — LIDOCAINE HYDROCHLORIDE 20 MG/ML
INJECTION, SOLUTION INTRAVENOUS
Status: DISCONTINUED | OUTPATIENT
Start: 2024-11-27 | End: 2024-11-27 | Stop reason: SDUPTHER

## 2024-11-27 RX ORDER — FENTANYL CITRATE 50 UG/ML
INJECTION, SOLUTION INTRAMUSCULAR; INTRAVENOUS
Status: DISCONTINUED | OUTPATIENT
Start: 2024-11-27 | End: 2024-11-27 | Stop reason: SDUPTHER

## 2024-11-27 RX ORDER — SODIUM CHLORIDE 0.9 % (FLUSH) 0.9 %
5-40 SYRINGE (ML) INJECTION EVERY 12 HOURS SCHEDULED
Status: DISCONTINUED | OUTPATIENT
Start: 2024-11-27 | End: 2024-11-27 | Stop reason: HOSPADM

## 2024-11-27 RX ORDER — DIPHENHYDRAMINE HYDROCHLORIDE 50 MG/ML
12.5 INJECTION INTRAMUSCULAR; INTRAVENOUS
Status: DISCONTINUED | OUTPATIENT
Start: 2024-11-27 | End: 2024-11-27 | Stop reason: HOSPADM

## 2024-11-27 RX ORDER — DEXAMETHASONE SODIUM PHOSPHATE 10 MG/ML
INJECTION INTRAMUSCULAR; INTRAVENOUS
Status: DISCONTINUED | OUTPATIENT
Start: 2024-11-27 | End: 2024-11-27 | Stop reason: SDUPTHER

## 2024-11-27 RX ORDER — LABETALOL HYDROCHLORIDE 5 MG/ML
5 INJECTION, SOLUTION INTRAVENOUS
Status: DISCONTINUED | OUTPATIENT
Start: 2024-11-27 | End: 2024-11-27 | Stop reason: HOSPADM

## 2024-11-27 RX ORDER — MIDAZOLAM HYDROCHLORIDE 1 MG/ML
INJECTION, SOLUTION INTRAMUSCULAR; INTRAVENOUS
Status: DISCONTINUED | OUTPATIENT
Start: 2024-11-27 | End: 2024-11-27 | Stop reason: SDUPTHER

## 2024-11-27 RX ORDER — HYDROMORPHONE HYDROCHLORIDE 1 MG/ML
0.5 INJECTION, SOLUTION INTRAMUSCULAR; INTRAVENOUS; SUBCUTANEOUS EVERY 5 MIN PRN
Status: DISCONTINUED | OUTPATIENT
Start: 2024-11-27 | End: 2024-11-27 | Stop reason: HOSPADM

## 2024-11-27 RX ORDER — NALOXONE HYDROCHLORIDE 0.4 MG/ML
INJECTION, SOLUTION INTRAMUSCULAR; INTRAVENOUS; SUBCUTANEOUS PRN
Status: DISCONTINUED | OUTPATIENT
Start: 2024-11-27 | End: 2024-11-27 | Stop reason: HOSPADM

## 2024-11-27 RX ORDER — ONDANSETRON 2 MG/ML
INJECTION INTRAMUSCULAR; INTRAVENOUS
Status: DISCONTINUED | OUTPATIENT
Start: 2024-11-27 | End: 2024-11-27 | Stop reason: SDUPTHER

## 2024-11-27 RX ORDER — PROCHLORPERAZINE EDISYLATE 5 MG/ML
5 INJECTION INTRAMUSCULAR; INTRAVENOUS
Status: DISCONTINUED | OUTPATIENT
Start: 2024-11-27 | End: 2024-11-27 | Stop reason: HOSPADM

## 2024-11-27 RX ORDER — SODIUM CHLORIDE 0.9 % (FLUSH) 0.9 %
5-40 SYRINGE (ML) INJECTION PRN
Status: DISCONTINUED | OUTPATIENT
Start: 2024-11-27 | End: 2024-11-27 | Stop reason: HOSPADM

## 2024-11-27 RX ORDER — IPRATROPIUM BROMIDE AND ALBUTEROL SULFATE 2.5; .5 MG/3ML; MG/3ML
1 SOLUTION RESPIRATORY (INHALATION)
Status: DISCONTINUED | OUTPATIENT
Start: 2024-11-27 | End: 2024-11-27 | Stop reason: HOSPADM

## 2024-11-27 RX ORDER — ENOXAPARIN SODIUM 100 MG/ML
40 INJECTION SUBCUTANEOUS 2 TIMES DAILY
Qty: 24 ML | Refills: 0 | Status: SHIPPED | OUTPATIENT
Start: 2024-11-27 | End: 2024-12-27

## 2024-11-27 RX ORDER — SODIUM CHLORIDE 9 MG/ML
INJECTION, SOLUTION INTRAVENOUS PRN
Status: DISCONTINUED | OUTPATIENT
Start: 2024-11-27 | End: 2024-11-27 | Stop reason: HOSPADM

## 2024-11-27 RX ORDER — SODIUM CHLORIDE 9 MG/ML
INJECTION, SOLUTION INTRAVENOUS PRN
Status: DISCONTINUED | OUTPATIENT
Start: 2024-11-27 | End: 2024-11-28

## 2024-11-27 RX ORDER — OXYCODONE AND ACETAMINOPHEN 5; 325 MG/1; MG/1
1 TABLET ORAL EVERY 6 HOURS PRN
Qty: 28 TABLET | Refills: 0 | Status: SHIPPED | OUTPATIENT
Start: 2024-11-27 | End: 2024-12-04

## 2024-11-27 RX ORDER — VANCOMYCIN HYDROCHLORIDE 1 G/20ML
INJECTION, POWDER, LYOPHILIZED, FOR SOLUTION INTRAVENOUS PRN
Status: DISCONTINUED | OUTPATIENT
Start: 2024-11-27 | End: 2024-11-27 | Stop reason: ALTCHOICE

## 2024-11-27 RX ORDER — PROPOFOL 10 MG/ML
INJECTION, EMULSION INTRAVENOUS
Status: DISCONTINUED | OUTPATIENT
Start: 2024-11-27 | End: 2024-11-27 | Stop reason: SDUPTHER

## 2024-11-27 RX ORDER — ONDANSETRON 2 MG/ML
4 INJECTION INTRAMUSCULAR; INTRAVENOUS
Status: DISCONTINUED | OUTPATIENT
Start: 2024-11-27 | End: 2024-11-27 | Stop reason: HOSPADM

## 2024-11-27 RX ORDER — HYDROMORPHONE HYDROCHLORIDE 1 MG/ML
0.25 INJECTION, SOLUTION INTRAMUSCULAR; INTRAVENOUS; SUBCUTANEOUS EVERY 5 MIN PRN
Status: DISCONTINUED | OUTPATIENT
Start: 2024-11-27 | End: 2024-11-27 | Stop reason: HOSPADM

## 2024-11-27 RX ORDER — CEFAZOLIN SODIUM 1 G/3ML
INJECTION, POWDER, FOR SOLUTION INTRAMUSCULAR; INTRAVENOUS
Status: DISCONTINUED | OUTPATIENT
Start: 2024-11-27 | End: 2024-11-27 | Stop reason: SDUPTHER

## 2024-11-27 RX ORDER — SODIUM CHLORIDE 9 MG/ML
INJECTION, SOLUTION INTRAVENOUS
Status: DISCONTINUED | OUTPATIENT
Start: 2024-11-27 | End: 2024-11-27 | Stop reason: SDUPTHER

## 2024-11-27 RX ORDER — MEPERIDINE HYDROCHLORIDE 25 MG/ML
12.5 INJECTION INTRAMUSCULAR; INTRAVENOUS; SUBCUTANEOUS
Status: DISCONTINUED | OUTPATIENT
Start: 2024-11-27 | End: 2024-11-27 | Stop reason: HOSPADM

## 2024-11-27 RX ORDER — HYDROMORPHONE HYDROCHLORIDE 2 MG/ML
INJECTION, SOLUTION INTRAMUSCULAR; INTRAVENOUS; SUBCUTANEOUS
Status: DISCONTINUED | OUTPATIENT
Start: 2024-11-27 | End: 2024-11-27 | Stop reason: SDUPTHER

## 2024-11-27 RX ADMIN — FENTANYL CITRATE 50 MCG: 50 INJECTION, SOLUTION INTRAMUSCULAR; INTRAVENOUS at 15:01

## 2024-11-27 RX ADMIN — ACETAMINOPHEN 650 MG: 325 TABLET ORAL at 03:12

## 2024-11-27 RX ADMIN — MIDAZOLAM 2 MG: 1 INJECTION INTRAMUSCULAR; INTRAVENOUS at 13:17

## 2024-11-27 RX ADMIN — LIDOCAINE HYDROCHLORIDE 100 MG: 20 INJECTION, SOLUTION INTRAVENOUS at 13:23

## 2024-11-27 RX ADMIN — OXYCODONE HYDROCHLORIDE 10 MG: 10 TABLET ORAL at 22:53

## 2024-11-27 RX ADMIN — Medication 10 MG: at 14:08

## 2024-11-27 RX ADMIN — ACETAMINOPHEN 650 MG: 325 TABLET ORAL at 22:52

## 2024-11-27 RX ADMIN — HYDROMORPHONE HYDROCHLORIDE 2 MG: 2 INJECTION, SOLUTION INTRAMUSCULAR; INTRAVENOUS; SUBCUTANEOUS at 15:13

## 2024-11-27 RX ADMIN — ACETAMINOPHEN 650 MG: 325 TABLET ORAL at 08:19

## 2024-11-27 RX ADMIN — DEXAMETHASONE SODIUM PHOSPHATE 10 MG: 10 INJECTION INTRAMUSCULAR; INTRAVENOUS at 13:27

## 2024-11-27 RX ADMIN — Medication 5 MG: at 22:53

## 2024-11-27 RX ADMIN — Medication 10 MG: at 14:39

## 2024-11-27 RX ADMIN — METHOCARBAMOL TABLETS 1000 MG: 500 TABLET, COATED ORAL at 22:52

## 2024-11-27 RX ADMIN — Medication 30 MG: at 13:49

## 2024-11-27 RX ADMIN — ONDANSETRON 4 MG: 2 INJECTION INTRAMUSCULAR; INTRAVENOUS at 13:27

## 2024-11-27 RX ADMIN — FENTANYL CITRATE 50 MCG: 50 INJECTION, SOLUTION INTRAMUSCULAR; INTRAVENOUS at 14:05

## 2024-11-27 RX ADMIN — FENTANYL CITRATE 100 MCG: 50 INJECTION, SOLUTION INTRAMUSCULAR; INTRAVENOUS at 13:23

## 2024-11-27 RX ADMIN — CEFAZOLIN 2 G: 1 INJECTION, POWDER, FOR SOLUTION INTRAMUSCULAR; INTRAVENOUS at 13:32

## 2024-11-27 RX ADMIN — SODIUM CHLORIDE, PRESERVATIVE FREE 10 ML: 5 INJECTION INTRAVENOUS at 22:53

## 2024-11-27 RX ADMIN — PROPOFOL 200 MG: 10 INJECTION, EMULSION INTRAVENOUS at 13:23

## 2024-11-27 RX ADMIN — METHOCARBAMOL TABLETS 1000 MG: 500 TABLET, COATED ORAL at 08:19

## 2024-11-27 RX ADMIN — SODIUM CHLORIDE: 9 INJECTION, SOLUTION INTRAVENOUS at 13:17

## 2024-11-27 ASSESSMENT — PAIN SCALES - GENERAL
PAINLEVEL_OUTOF10: 3
PAINLEVEL_OUTOF10: 5
PAINLEVEL_OUTOF10: 8
PAINLEVEL_OUTOF10: 8

## 2024-11-27 ASSESSMENT — ENCOUNTER SYMPTOMS: SHORTNESS OF BREATH: 0

## 2024-11-27 NOTE — DISCHARGE SUMMARY
degrees, lovenox, 24 h ancef post op, follow up in 1 week for repeat xrays and suture removal. Vascular duplex ultrasounds negative.   12/3: no issues overnight. Pain well controlled. Pending PT/OT evaluation and HORTENSIA placement.   12/4: no issues overnight. Pending placement to H[facility]ENSIA. AM-PAC 9/24.   12/5: overnight zarate removed and patient voided 100 cc clear yellow fluid. States he is having some congestion, runny nose and sore throat. Encouraged incentive spirometry use. Ok for throat lozenges. Still has not had a bowel movement, increased fiber in diet, added glycolax and lactulose to bowel regimen. Pending HORTENSIA placement.   12/6: No issues overnight. Pain well controlled. Still has not had a BM.  Awaiting HORTENSIA placement.  12/7: No acute issues overnight.  The patient's pain is well-controlled.  Less swelling in his left lower extremity.  Plan for placement.  12/8: No issues overnight. Pain well controlled. Having some numbness in his left thumb. Awaiting placement.  12/9: No issues overnight. Pain is controlled and a 4/10. Still has left thumb numbness and also has left lateral thigh numbness. Otherwise doing well and is pending placement. Last bowel movement on Friday (12/6).   12/10: Overnight patient complained of difficulty sleeping and felt as though he had night sweats, chills, increased thirst and decreased urine output overnight. He remains afebrile and hemodynamically stable. Labs remain within normal limits. Urine output adequate. Pending discharge to [facility].   12/11: no issues overnight. Patient feeling better today. WBC decreased to 6.7. Wife has decided to pursue [facility] [place] [facility] [place] but cannot accept due to patient acuity level. Doppler ultrasound bilateral lower extremities negative. Patient stable for discharge to  [facility].     Consults:   IP CONSULT TO DIETITIAN  INPATIENT CONSULT TO ORTHOTIST/PROSTHETIST    Significant Diagnostic Studies:   CT WRIST LEFT WO

## 2024-11-27 NOTE — CARE COORDINATION
RYAN transition of care.  Patient presented as a Trauma Team to [facility] ER secondary to a fall from a ladder.  Admitted inpatient with open fracture of ulna.  Orthopedics on consult.  Patient currently off unit in OR for Irrigation and debridement with open reduction internal fixation of left open both bone forearm fracture with open reduction internal fixation of left acetabular fracture.  Will need therapy notes post op.  Unable to meet with patient at this time.  CM will follow up with patient at later time.  Jan Damico RN -[phone].

## 2024-11-27 NOTE — ANESTHESIA PRE PROCEDURE
consumption: 11/26/24    BMI:   Wt Readings from Last 3 Encounters:   11/26/24 78 kg (172 lb)     Body mass index is 26.94 kg/m².    CBC:   Lab Results   Component Value Date/Time    WBC 8.0 11/27/2024 04:37 AM    RBC 3.64 11/27/2024 04:37 AM    HGB 10.8 11/27/2024 04:37 AM    HCT 31.9 11/27/2024 04:37 AM    MCV 87.6 11/27/2024 04:37 AM    RDW 12.4 11/27/2024 04:37 AM     11/27/2024 04:37 AM       CMP:   Lab Results   Component Value Date/Time     11/27/2024 04:37 AM    K 4.1 11/27/2024 04:37 AM     11/27/2024 04:37 AM    CO2 25 11/27/2024 04:37 AM    BUN 22 11/27/2024 04:37 AM    CREATININE 1.2 11/27/2024 04:37 AM    LABGLOM 68 11/27/2024 04:37 AM    GLUCOSE 126 11/27/2024 04:37 AM    CALCIUM 8.5 11/27/2024 04:37 AM    BILITOT 0.6 11/26/2024 11:44 AM    ALKPHOS 84 11/26/2024 11:44 AM    AST 37 11/26/2024 11:44 AM    ALT 29 11/26/2024 11:44 AM       POC Tests: No results for input(s): \"POCGLU\", \"POCNA\", \"POCK\", \"POCCL\", \"POCBUN\", \"POCHEMO\", \"POCHCT\" in the last 72 hours.    Coags:   Lab Results   Component Value Date/Time    PROTIME 10.2 11/26/2024 11:44 AM    INR 0.9 11/26/2024 11:44 AM    APTT 21.3 11/26/2024 11:44 AM       HCG (If Applicable): No results found for: \"PREGTESTUR\", \"PREGSERUM\", \"HCG\", \"HCGQUANT\"     ABGs: No results found for: \"PHART\", \"PO2ART\", \"GIF5GVH\", \"BAV4ZGD\", \"BEART\", \"V0EEQDYK\"     Type & Screen (If Applicable):  Lab Results   Component Value Date    ABORH O POSITIVE 11/26/2024    LABANTI NEGATIVE 11/26/2024       Drug/Infectious Status (If Applicable):  No results found for: \"HIV\", \"HEPCAB\"    COVID-19 Screening (If Applicable): No results found for: \"COVID19\"        Anesthesia Evaluation     no history of anesthetic complications:   Airway: Mallampati: II  TM distance: >3 FB   Neck ROM: full  Mouth opening: > = 3 FB   Dental:          Pulmonary: breath sounds clear to auscultation      (-) shortness of breath                           Cardiovascular:  Exercise

## 2024-11-27 NOTE — DISCHARGE INSTRUCTIONS
TRAUMA SERVICES DISCHARGE INSTRUCTIONS    Call [phone], option 2, for any questions/concerns.     Please follow the instructions checked below:    Please follow-up with your primary care provider.    ACTIVITY INSTRUCTIONS  Increase activity as tolerated  No heavy lifting or strenuous activity  Take your incentive spirometer home and use 4-6 times/day   [x]  No driving until cleared by orthopedic surgery    MEDICATION INSTRUCTIONS  Take medication as prescribed.  When taking pain medications, you may experience dizziness or drowsiness.  Do not drink alcohol or drive when taking these medications.  You may experience constipation while taking pain medication.  You may take over the counter stool softeners such as docusate (Colace), sennosides S (Senokot-S), or Miralax.   []  You may take Ibuprofen (over the counter) as directed for mild pain.     --You may take up to 800mg every 8 hours for pain, please take with food or milk.   [x]  You may take acetaminophen (Tylenol) products.  Do NOT take more than 4000mg of Tylenol in 24h.   [x]  Do not take any other acetaminophen (Tylenol) products if you are taking Percocet or Norco, as these contain Tylenol.   --Do NOT take more than 4000mg of Tylenol in 24h.    OPIOID MEDICATION INSTRUCTIONS  Read the medication guide that is included with your prescription.  Take your medication exactly as prescribed.  Store medication away from children and in a safe place.  Do NOT share your medication with others.  Do NOT take medication unless it is prescribed for you.  Do NOT drink alcohol while taking opioids (I.e., Norco, Percocet, Oxycodone, etc).  Discuss with the Trauma Clinic staff if the dose of medication you are taking does not control your pain and any side effects that you may be having.      CALL 911 OR YOUR LOCAL EMERGENCY SERVICE:  --If you take too much medication  --If you have trouble breathing or shortness of breath  --A child has taken this

## 2024-11-28 LAB
ANION GAP SERPL CALCULATED.3IONS-SCNC: 13 MMOL/L (ref 7–16)
BASOPHILS # BLD: 0.01 K/UL (ref 0–0.2)
BASOPHILS NFR BLD: 0 % (ref 0–2)
BUN SERPL-MCNC: 16 MG/DL (ref 6–20)
CALCIUM SERPL-MCNC: 8.5 MG/DL (ref 8.6–10.2)
CHLORIDE SERPL-SCNC: 105 MMOL/L (ref 98–107)
CO2 SERPL-SCNC: 22 MMOL/L (ref 22–29)
CREAT SERPL-MCNC: 1 MG/DL (ref 0.7–1.2)
EOSINOPHIL # BLD: 0 K/UL (ref 0.05–0.5)
EOSINOPHILS RELATIVE PERCENT: 0 % (ref 0–6)
ERYTHROCYTE [DISTWIDTH] IN BLOOD BY AUTOMATED COUNT: 12.4 % (ref 11.5–15)
GFR, ESTIMATED: >90 ML/MIN/1.73M2
GLUCOSE SERPL-MCNC: 134 MG/DL (ref 74–99)
HCT VFR BLD AUTO: 32.3 % (ref 37–54)
HGB BLD-MCNC: 10.6 G/DL (ref 12.5–16.5)
IMM GRANULOCYTES # BLD AUTO: 0.06 K/UL (ref 0–0.58)
IMM GRANULOCYTES NFR BLD: 1 % (ref 0–5)
LYMPHOCYTES NFR BLD: 0.72 K/UL (ref 1.5–4)
LYMPHOCYTES RELATIVE PERCENT: 7 % (ref 20–42)
MCH RBC QN AUTO: 29 PG (ref 26–35)
MCHC RBC AUTO-ENTMCNC: 32.8 G/DL (ref 32–34.5)
MCV RBC AUTO: 88.5 FL (ref 80–99.9)
MONOCYTES NFR BLD: 0.74 K/UL (ref 0.1–0.95)
MONOCYTES NFR BLD: 7 % (ref 2–12)
NEUTROPHILS NFR BLD: 86 % (ref 43–80)
NEUTS SEG NFR BLD: 9.44 K/UL (ref 1.8–7.3)
PLATELET # BLD AUTO: 133 K/UL (ref 130–450)
PMV BLD AUTO: 12.2 FL (ref 7–12)
POTASSIUM SERPL-SCNC: 4.3 MMOL/L (ref 3.5–5)
RBC # BLD AUTO: 3.65 M/UL (ref 3.8–5.8)
SODIUM SERPL-SCNC: 140 MMOL/L (ref 132–146)
WBC OTHER # BLD: 11 K/UL (ref 4.5–11.5)

## 2024-11-28 PROCEDURE — 99232 SBSQ HOSP IP/OBS MODERATE 35: CPT | Performed by: SURGERY

## 2024-11-28 PROCEDURE — 36415 COLL VENOUS BLD VENIPUNCTURE: CPT

## 2024-11-28 PROCEDURE — 2580000003 HC RX 258

## 2024-11-28 PROCEDURE — 80048 BASIC METABOLIC PNL TOTAL CA: CPT

## 2024-11-28 PROCEDURE — 6360000002 HC RX W HCPCS: Performed by: STUDENT IN AN ORGANIZED HEALTH CARE EDUCATION/TRAINING PROGRAM

## 2024-11-28 PROCEDURE — 2500000003 HC RX 250 WO HCPCS

## 2024-11-28 PROCEDURE — 6370000000 HC RX 637 (ALT 250 FOR IP)

## 2024-11-28 PROCEDURE — 85025 COMPLETE CBC W/AUTO DIFF WBC: CPT

## 2024-11-28 PROCEDURE — 1200000000 HC SEMI PRIVATE

## 2024-11-28 RX ORDER — ENOXAPARIN SODIUM 100 MG/ML
30 INJECTION SUBCUTANEOUS 2 TIMES DAILY
Status: DISCONTINUED | OUTPATIENT
Start: 2024-11-28 | End: 2024-12-11 | Stop reason: HOSPADM

## 2024-11-28 RX ADMIN — ENOXAPARIN SODIUM 30 MG: 100 INJECTION SUBCUTANEOUS at 20:42

## 2024-11-28 RX ADMIN — Medication 5 MG: at 20:42

## 2024-11-28 RX ADMIN — METHOCARBAMOL TABLETS 1000 MG: 500 TABLET, COATED ORAL at 16:02

## 2024-11-28 RX ADMIN — METHOCARBAMOL TABLETS 1000 MG: 500 TABLET, COATED ORAL at 08:27

## 2024-11-28 RX ADMIN — HYDROMORPHONE HYDROCHLORIDE 0.5 MG: 1 INJECTION, SOLUTION INTRAMUSCULAR; INTRAVENOUS; SUBCUTANEOUS at 19:24

## 2024-11-28 RX ADMIN — METHOCARBAMOL TABLETS 1000 MG: 500 TABLET, COATED ORAL at 20:42

## 2024-11-28 RX ADMIN — METHOCARBAMOL TABLETS 1000 MG: 500 TABLET, COATED ORAL at 12:02

## 2024-11-28 RX ADMIN — HYDROMORPHONE HYDROCHLORIDE 0.5 MG: 1 INJECTION, SOLUTION INTRAMUSCULAR; INTRAVENOUS; SUBCUTANEOUS at 06:44

## 2024-11-28 RX ADMIN — SODIUM CHLORIDE, PRESERVATIVE FREE 10 ML: 5 INJECTION INTRAVENOUS at 08:28

## 2024-11-28 RX ADMIN — SODIUM CHLORIDE, POTASSIUM CHLORIDE, SODIUM LACTATE AND CALCIUM CHLORIDE: 600; 310; 30; 20 INJECTION, SOLUTION INTRAVENOUS at 06:44

## 2024-11-28 RX ADMIN — ACETAMINOPHEN 650 MG: 325 TABLET ORAL at 03:15

## 2024-11-28 RX ADMIN — ACETAMINOPHEN 650 MG: 325 TABLET ORAL at 16:02

## 2024-11-28 RX ADMIN — ACETAMINOPHEN 650 MG: 325 TABLET ORAL at 20:42

## 2024-11-28 RX ADMIN — ENOXAPARIN SODIUM 30 MG: 100 INJECTION SUBCUTANEOUS at 08:27

## 2024-11-28 RX ADMIN — SODIUM CHLORIDE, PRESERVATIVE FREE 10 ML: 5 INJECTION INTRAVENOUS at 20:42

## 2024-11-28 RX ADMIN — HYDROMORPHONE HYDROCHLORIDE 0.5 MG: 1 INJECTION, SOLUTION INTRAMUSCULAR; INTRAVENOUS; SUBCUTANEOUS at 11:02

## 2024-11-28 RX ADMIN — ACETAMINOPHEN 650 MG: 325 TABLET ORAL at 08:28

## 2024-11-28 ASSESSMENT — PAIN SCALES - GENERAL
PAINLEVEL_OUTOF10: 8
PAINLEVEL_OUTOF10: 5
PAINLEVEL_OUTOF10: 1
PAINLEVEL_OUTOF10: 8
PAINLEVEL_OUTOF10: 7
PAINLEVEL_OUTOF10: 8
PAINLEVEL_OUTOF10: 3
PAINLEVEL_OUTOF10: 10
PAINLEVEL_OUTOF10: 4
PAINLEVEL_OUTOF10: 7
PAINLEVEL_OUTOF10: 8
PAINLEVEL_OUTOF10: 9

## 2024-11-28 ASSESSMENT — PAIN DESCRIPTION - DESCRIPTORS
DESCRIPTORS: ACHING;DULL;DISCOMFORT
DESCRIPTORS: ACHING;DISCOMFORT;DULL

## 2024-11-28 ASSESSMENT — PAIN - FUNCTIONAL ASSESSMENT: PAIN_FUNCTIONAL_ASSESSMENT: PREVENTS OR INTERFERES SOME ACTIVE ACTIVITIES AND ADLS

## 2024-11-28 ASSESSMENT — PAIN DESCRIPTION - ORIENTATION
ORIENTATION: LEFT
ORIENTATION: LEFT

## 2024-11-28 ASSESSMENT — PAIN DESCRIPTION - ONSET: ONSET: ON-GOING

## 2024-11-28 ASSESSMENT — PAIN DESCRIPTION - LOCATION
LOCATION: HIP;ARM
LOCATION: HIP

## 2024-11-28 ASSESSMENT — PAIN SCALES - WONG BAKER: WONGBAKER_NUMERICALRESPONSE: HURTS A LITTLE BIT

## 2024-11-28 ASSESSMENT — PAIN DESCRIPTION - FREQUENCY: FREQUENCY: CONTINUOUS

## 2024-11-28 ASSESSMENT — PAIN DESCRIPTION - PAIN TYPE: TYPE: ACUTE PAIN

## 2024-11-28 NOTE — ANESTHESIA POSTPROCEDURE EVALUATION
Department of Anesthesiology  Postprocedure Note    Patient: Issac Ramirez  MRN: [id number]  YOB: 1965  Date of evaluation: 11/28/2024    Procedure Summary       Date: 11/27/24 Room / Location: [address]    Anesthesia Start: 1317 Anesthesia Stop: 1548    Procedure: LEFT FOREARM INCISION AND DRAINAGE WITH LEFT RADIUS AND ULNA OPEN REDUCTION INTERNAL FIXATION WITH APPLICATION OF WOUND VAC (Left: Arm Lower) Diagnosis:       Trauma      (Trauma [T14.90XA])    Surgeons: Arthur Daigle MD Responsible Provider: Ry Godfrey DO    Anesthesia Type: general ASA Status: 2            Anesthesia Type: No value filed.    Sergio Phase I: Sergio Score: 8    Sergio Phase II:      Anesthesia Post Evaluation    Patient location during evaluation: PACU  Patient participation: complete - patient participated  Level of consciousness: awake  Cardiovascular status: blood pressure returned to baseline  Respiratory status: acceptable  Hydration status: euvolemic  Multimodal analgesia pain management approach  Pain management: adequate        No notable events documented.

## 2024-11-28 NOTE — ED PROVIDER NOTES
Issac Ramirez is a 59-year-old male present emergency department after trauma.  Patient fell on 12 feet off of a ladder onto his left arm.  EMS reported patient did not have a pulse in that arm.  Patient states he did not hit his head did not lose consciousness.      The history is provided by the patient and medical records.        Review of Systems   Constitutional:  Negative for chills, diaphoresis, fatigue and fever.   Eyes:  Negative for photophobia and visual disturbance.   Respiratory:  Negative for cough, chest tightness and shortness of breath.    Cardiovascular:  Negative for chest pain, palpitations and leg swelling.   Gastrointestinal:  Negative for abdominal distention, abdominal pain, diarrhea, nausea and vomiting.   Genitourinary:  Negative for dysuria.   Musculoskeletal:  Negative for back pain, neck pain and neck stiffness.   Skin:  Negative for pallor and rash.   Neurological:  Negative for headaches.   Psychiatric/Behavioral:  Negative for confusion.         Physical Exam  Vitals and nursing note reviewed.   Constitutional:       General: He is not in acute distress.     Appearance: Normal appearance. He is not ill-appearing.   HENT:      Head: Normocephalic and atraumatic.   Eyes:      General: No scleral icterus.     Conjunctiva/sclera: Conjunctivae normal.      Pupils: Pupils are equal, round, and reactive to light.   Neck:      Comments: C-collar in place  Cardiovascular:      Rate and Rhythm: Normal rate and regular rhythm.   Pulmonary:      Effort: Pulmonary effort is normal.      Breath sounds: Normal breath sounds.   Abdominal:      General: Bowel sounds are normal. There is no distension.      Palpations: Abdomen is soft.      Tenderness: There is no abdominal tenderness. There is no guarding or rebound.   Musculoskeletal:         General: Tenderness, deformity and signs of injury present.      Cervical back: No muscular tenderness.      Right lower leg: No edema.      Left lower leg:

## 2024-11-29 LAB
ABO/RH: NORMAL
ANION GAP SERPL CALCULATED.3IONS-SCNC: 9 MMOL/L (ref 7–16)
ANTIBODY SCREEN: NEGATIVE
ARM BAND NUMBER: NORMAL
BASOPHILS # BLD: 0.04 K/UL (ref 0–0.2)
BASOPHILS NFR BLD: 1 % (ref 0–2)
BLOOD BANK BLOOD PRODUCT EXPIRATION DATE: NORMAL
BLOOD BANK DISPENSE STATUS: NORMAL
BLOOD BANK ISBT PRODUCT BLOOD TYPE: 5100
BLOOD BANK PRODUCT CODE: NORMAL
BLOOD BANK SAMPLE EXPIRATION: NORMAL
BLOOD BANK UNIT TYPE AND RH: NORMAL
BPU ID: NORMAL
BUN SERPL-MCNC: 16 MG/DL (ref 6–20)
CALCIUM SERPL-MCNC: 8.4 MG/DL (ref 8.6–10.2)
CHLORIDE SERPL-SCNC: 109 MMOL/L (ref 98–107)
CO2 SERPL-SCNC: 26 MMOL/L (ref 22–29)
COMPONENT: NORMAL
CREAT SERPL-MCNC: 1 MG/DL (ref 0.7–1.2)
CROSSMATCH RESULT: NORMAL
EOSINOPHIL # BLD: 0.05 K/UL (ref 0.05–0.5)
EOSINOPHILS RELATIVE PERCENT: 1 % (ref 0–6)
ERYTHROCYTE [DISTWIDTH] IN BLOOD BY AUTOMATED COUNT: 12.5 % (ref 11.5–15)
GFR, ESTIMATED: 83 ML/MIN/1.73M2
GLUCOSE SERPL-MCNC: 95 MG/DL (ref 74–99)
HCT VFR BLD AUTO: 28.4 % (ref 37–54)
HGB BLD-MCNC: 9.4 G/DL (ref 12.5–16.5)
IMM GRANULOCYTES # BLD AUTO: 0.04 K/UL (ref 0–0.58)
IMM GRANULOCYTES NFR BLD: 1 % (ref 0–5)
LYMPHOCYTES NFR BLD: 1.55 K/UL (ref 1.5–4)
LYMPHOCYTES RELATIVE PERCENT: 22 % (ref 20–42)
MCH RBC QN AUTO: 29.5 PG (ref 26–35)
MCHC RBC AUTO-ENTMCNC: 33.1 G/DL (ref 32–34.5)
MCV RBC AUTO: 89 FL (ref 80–99.9)
MONOCYTES NFR BLD: 0.55 K/UL (ref 0.1–0.95)
MONOCYTES NFR BLD: 8 % (ref 2–12)
NEUTROPHILS NFR BLD: 68 % (ref 43–80)
NEUTS SEG NFR BLD: 4.74 K/UL (ref 1.8–7.3)
PLATELET # BLD AUTO: 121 K/UL (ref 130–450)
PMV BLD AUTO: 12.5 FL (ref 7–12)
POTASSIUM SERPL-SCNC: 3.7 MMOL/L (ref 3.5–5)
RBC # BLD AUTO: 3.19 M/UL (ref 3.8–5.8)
SODIUM SERPL-SCNC: 144 MMOL/L (ref 132–146)
TRANSFUSION STATUS: NORMAL
UNIT DIVISION: 0
UNIT ISSUE DATE/TIME: NORMAL
WBC OTHER # BLD: 7 K/UL (ref 4.5–11.5)

## 2024-11-29 PROCEDURE — 97161 PT EVAL LOW COMPLEX 20 MIN: CPT

## 2024-11-29 PROCEDURE — 2500000003 HC RX 250 WO HCPCS

## 2024-11-29 PROCEDURE — 86850 RBC ANTIBODY SCREEN: CPT

## 2024-11-29 PROCEDURE — 6370000000 HC RX 637 (ALT 250 FOR IP)

## 2024-11-29 PROCEDURE — 80048 BASIC METABOLIC PNL TOTAL CA: CPT

## 2024-11-29 PROCEDURE — 1200000000 HC SEMI PRIVATE

## 2024-11-29 PROCEDURE — 85025 COMPLETE CBC W/AUTO DIFF WBC: CPT

## 2024-11-29 PROCEDURE — 2580000003 HC RX 258

## 2024-11-29 PROCEDURE — 6360000002 HC RX W HCPCS: Performed by: STUDENT IN AN ORGANIZED HEALTH CARE EDUCATION/TRAINING PROGRAM

## 2024-11-29 PROCEDURE — 86900 BLOOD TYPING SEROLOGIC ABO: CPT

## 2024-11-29 PROCEDURE — 36415 COLL VENOUS BLD VENIPUNCTURE: CPT

## 2024-11-29 PROCEDURE — 99232 SBSQ HOSP IP/OBS MODERATE 35: CPT | Performed by: SURGERY

## 2024-11-29 PROCEDURE — 97165 OT EVAL LOW COMPLEX 30 MIN: CPT

## 2024-11-29 PROCEDURE — 86923 COMPATIBILITY TEST ELECTRIC: CPT

## 2024-11-29 PROCEDURE — 86901 BLOOD TYPING SEROLOGIC RH(D): CPT

## 2024-11-29 RX ADMIN — METHOCARBAMOL TABLETS 1000 MG: 500 TABLET, COATED ORAL at 16:14

## 2024-11-29 RX ADMIN — HYDROMORPHONE HYDROCHLORIDE 0.5 MG: 1 INJECTION, SOLUTION INTRAMUSCULAR; INTRAVENOUS; SUBCUTANEOUS at 12:01

## 2024-11-29 RX ADMIN — OXYCODONE HYDROCHLORIDE 10 MG: 10 TABLET ORAL at 17:17

## 2024-11-29 RX ADMIN — ACETAMINOPHEN 650 MG: 325 TABLET ORAL at 16:15

## 2024-11-29 RX ADMIN — Medication 5 MG: at 21:06

## 2024-11-29 RX ADMIN — ENOXAPARIN SODIUM 30 MG: 100 INJECTION SUBCUTANEOUS at 21:05

## 2024-11-29 RX ADMIN — HYDROMORPHONE HYDROCHLORIDE 0.5 MG: 1 INJECTION, SOLUTION INTRAMUSCULAR; INTRAVENOUS; SUBCUTANEOUS at 21:04

## 2024-11-29 RX ADMIN — METHOCARBAMOL TABLETS 1000 MG: 500 TABLET, COATED ORAL at 12:17

## 2024-11-29 RX ADMIN — SODIUM CHLORIDE, PRESERVATIVE FREE 10 ML: 5 INJECTION INTRAVENOUS at 21:06

## 2024-11-29 RX ADMIN — ACETAMINOPHEN 650 MG: 325 TABLET ORAL at 03:06

## 2024-11-29 RX ADMIN — SODIUM CHLORIDE, PRESERVATIVE FREE 10 ML: 5 INJECTION INTRAVENOUS at 08:21

## 2024-11-29 RX ADMIN — OXYCODONE HYDROCHLORIDE 5 MG: 5 TABLET ORAL at 03:06

## 2024-11-29 RX ADMIN — METHOCARBAMOL TABLETS 1000 MG: 500 TABLET, COATED ORAL at 21:06

## 2024-11-29 RX ADMIN — POTASSIUM BICARBONATE 40 MEQ: 782 TABLET, EFFERVESCENT ORAL at 09:23

## 2024-11-29 RX ADMIN — METHOCARBAMOL TABLETS 1000 MG: 500 TABLET, COATED ORAL at 08:20

## 2024-11-29 RX ADMIN — ACETAMINOPHEN 650 MG: 325 TABLET ORAL at 21:05

## 2024-11-29 RX ADMIN — ACETAMINOPHEN 650 MG: 325 TABLET ORAL at 08:20

## 2024-11-29 RX ADMIN — ENOXAPARIN SODIUM 30 MG: 100 INJECTION SUBCUTANEOUS at 08:21

## 2024-11-29 ASSESSMENT — PAIN DESCRIPTION - DESCRIPTORS
DESCRIPTORS: ACHING;DISCOMFORT;DULL
DESCRIPTORS: ACHING;DULL;DISCOMFORT
DESCRIPTORS: ACHING;DULL;DISCOMFORT

## 2024-11-29 ASSESSMENT — PAIN SCALES - GENERAL
PAINLEVEL_OUTOF10: 5
PAINLEVEL_OUTOF10: 0
PAINLEVEL_OUTOF10: 5
PAINLEVEL_OUTOF10: 4
PAINLEVEL_OUTOF10: 7
PAINLEVEL_OUTOF10: 8
PAINLEVEL_OUTOF10: 2

## 2024-11-29 ASSESSMENT — PAIN SCALES - WONG BAKER
WONGBAKER_NUMERICALRESPONSE: NO HURT
WONGBAKER_NUMERICALRESPONSE: HURTS A LITTLE BIT
WONGBAKER_NUMERICALRESPONSE: HURTS A LITTLE BIT
WONGBAKER_NUMERICALRESPONSE: NO HURT

## 2024-11-29 ASSESSMENT — PAIN DESCRIPTION - ORIENTATION
ORIENTATION: LEFT

## 2024-11-29 ASSESSMENT — PAIN DESCRIPTION - LOCATION
LOCATION: HIP
LOCATION: HIP;ARM
LOCATION: HIP

## 2024-11-29 NOTE — CARE COORDINATION
CM update note.  Patient admitted after a fall from a ladder.  S/P irrigation and debridement with ORIF of the left distal radius and ulnar shaft fractures on 11/27  Wound Vac in place.  Plan is for possible repeat I&D of left forearm with possible wound  12/1. Planning for ORIF of left acetabulum this week vs next week.   Met with patient at the bedside to discuss discharge planning.  He lives in a 2 story home with 3 steps to enter.  Bed and bath on second floor with 1/2 bath on the first.  PTA was independent, working full time and an active .  Reports no history of DME/HHC/HORTENSIA.  PCP is Dr Jorge.  Discussed Home with HHC, HORTENSIA and ARU.  He agrees is will most likely need to go to rehab.  IF ARU is appropriate, he prefers [place].  HORTENSIA list left with patient.  Await therapy evals.  CM/SW to follow.  Jan Damico RN -[phone].

## 2024-11-30 LAB
ANION GAP SERPL CALCULATED.3IONS-SCNC: 11 MMOL/L (ref 7–16)
BASOPHILS # BLD: 0.03 K/UL (ref 0–0.2)
BASOPHILS NFR BLD: 1 % (ref 0–2)
BUN SERPL-MCNC: 15 MG/DL (ref 6–20)
CALCIUM SERPL-MCNC: 8.7 MG/DL (ref 8.6–10.2)
CHLORIDE SERPL-SCNC: 104 MMOL/L (ref 98–107)
CO2 SERPL-SCNC: 24 MMOL/L (ref 22–29)
CREAT SERPL-MCNC: 0.9 MG/DL (ref 0.7–1.2)
EOSINOPHIL # BLD: 0.1 K/UL (ref 0.05–0.5)
EOSINOPHILS RELATIVE PERCENT: 2 % (ref 0–6)
ERYTHROCYTE [DISTWIDTH] IN BLOOD BY AUTOMATED COUNT: 12.2 % (ref 11.5–15)
GFR, ESTIMATED: >90 ML/MIN/1.73M2
GLUCOSE SERPL-MCNC: 101 MG/DL (ref 74–99)
HCT VFR BLD AUTO: 29.5 % (ref 37–54)
HGB BLD-MCNC: 9.8 G/DL (ref 12.5–16.5)
IMM GRANULOCYTES # BLD AUTO: 0.05 K/UL (ref 0–0.58)
IMM GRANULOCYTES NFR BLD: 1 % (ref 0–5)
LYMPHOCYTES NFR BLD: 1.1 K/UL (ref 1.5–4)
LYMPHOCYTES RELATIVE PERCENT: 19 % (ref 20–42)
MCH RBC QN AUTO: 29.3 PG (ref 26–35)
MCHC RBC AUTO-ENTMCNC: 33.2 G/DL (ref 32–34.5)
MCV RBC AUTO: 88.1 FL (ref 80–99.9)
MONOCYTES NFR BLD: 0.53 K/UL (ref 0.1–0.95)
MONOCYTES NFR BLD: 9 % (ref 2–12)
NEUTROPHILS NFR BLD: 70 % (ref 43–80)
NEUTS SEG NFR BLD: 4.13 K/UL (ref 1.8–7.3)
PLATELET # BLD AUTO: 136 K/UL (ref 130–450)
PMV BLD AUTO: 12 FL (ref 7–12)
POTASSIUM SERPL-SCNC: 3.8 MMOL/L (ref 3.5–5)
RBC # BLD AUTO: 3.35 M/UL (ref 3.8–5.8)
SODIUM SERPL-SCNC: 139 MMOL/L (ref 132–146)
WBC OTHER # BLD: 5.9 K/UL (ref 4.5–11.5)

## 2024-11-30 PROCEDURE — 1200000000 HC SEMI PRIVATE

## 2024-11-30 PROCEDURE — 99232 SBSQ HOSP IP/OBS MODERATE 35: CPT | Performed by: SURGERY

## 2024-11-30 PROCEDURE — 85025 COMPLETE CBC W/AUTO DIFF WBC: CPT

## 2024-11-30 PROCEDURE — 6370000000 HC RX 637 (ALT 250 FOR IP)

## 2024-11-30 PROCEDURE — 2580000003 HC RX 258

## 2024-11-30 PROCEDURE — 80048 BASIC METABOLIC PNL TOTAL CA: CPT

## 2024-11-30 PROCEDURE — 36415 COLL VENOUS BLD VENIPUNCTURE: CPT

## 2024-11-30 PROCEDURE — 6360000002 HC RX W HCPCS: Performed by: STUDENT IN AN ORGANIZED HEALTH CARE EDUCATION/TRAINING PROGRAM

## 2024-11-30 RX ORDER — SENNOSIDES A AND B 8.6 MG/1
1 TABLET, FILM COATED ORAL 2 TIMES DAILY
Status: DISCONTINUED | OUTPATIENT
Start: 2024-11-30 | End: 2024-12-11 | Stop reason: HOSPADM

## 2024-11-30 RX ADMIN — Medication 5 MG: at 21:55

## 2024-11-30 RX ADMIN — SODIUM CHLORIDE, PRESERVATIVE FREE 10 ML: 5 INJECTION INTRAVENOUS at 21:56

## 2024-11-30 RX ADMIN — ACETAMINOPHEN 650 MG: 325 TABLET ORAL at 21:40

## 2024-11-30 RX ADMIN — METHOCARBAMOL TABLETS 1000 MG: 500 TABLET, COATED ORAL at 08:40

## 2024-11-30 RX ADMIN — ACETAMINOPHEN 650 MG: 325 TABLET ORAL at 02:03

## 2024-11-30 RX ADMIN — METHOCARBAMOL TABLETS 1000 MG: 500 TABLET, COATED ORAL at 13:10

## 2024-11-30 RX ADMIN — METHOCARBAMOL TABLETS 1000 MG: 500 TABLET, COATED ORAL at 16:19

## 2024-11-30 RX ADMIN — SENNOSIDES 8.6 MG: 8.6 TABLET, FILM COATED ORAL at 08:40

## 2024-11-30 RX ADMIN — ACETAMINOPHEN 650 MG: 325 TABLET ORAL at 16:18

## 2024-11-30 RX ADMIN — METHOCARBAMOL TABLETS 1000 MG: 500 TABLET, COATED ORAL at 21:40

## 2024-11-30 RX ADMIN — ACETAMINOPHEN 650 MG: 325 TABLET ORAL at 08:40

## 2024-11-30 RX ADMIN — POTASSIUM BICARBONATE 20 MEQ: 782 TABLET, EFFERVESCENT ORAL at 08:41

## 2024-11-30 RX ADMIN — ENOXAPARIN SODIUM 30 MG: 100 INJECTION SUBCUTANEOUS at 08:40

## 2024-11-30 ASSESSMENT — PAIN DESCRIPTION - DESCRIPTORS
DESCRIPTORS: ACHING;DISCOMFORT;DULL
DESCRIPTORS: SORE;SPASM;SHOOTING

## 2024-11-30 ASSESSMENT — PAIN DESCRIPTION - LOCATION
LOCATION: HIP
LOCATION: ARM;LEG;HIP

## 2024-11-30 ASSESSMENT — PAIN SCALES - WONG BAKER
WONGBAKER_NUMERICALRESPONSE: NO HURT

## 2024-11-30 ASSESSMENT — PAIN DESCRIPTION - ORIENTATION
ORIENTATION: LEFT
ORIENTATION: LEFT

## 2024-11-30 ASSESSMENT — PAIN SCALES - GENERAL
PAINLEVEL_OUTOF10: 4
PAINLEVEL_OUTOF10: 6

## 2024-12-01 ENCOUNTER — ANESTHESIA EVENT (OUTPATIENT)
Dept: OPERATING ROOM | Age: 59
End: 2024-12-01
Payer: COMMERCIAL

## 2024-12-01 ENCOUNTER — ANESTHESIA (OUTPATIENT)
Dept: OPERATING ROOM | Age: 59
End: 2024-12-01
Payer: COMMERCIAL

## 2024-12-01 LAB
ANION GAP SERPL CALCULATED.3IONS-SCNC: 11 MMOL/L (ref 7–16)
BASOPHILS # BLD: 0.04 K/UL (ref 0–0.2)
BASOPHILS NFR BLD: 1 % (ref 0–2)
BUN SERPL-MCNC: 17 MG/DL (ref 6–20)
CALCIUM SERPL-MCNC: 8.7 MG/DL (ref 8.6–10.2)
CHLORIDE SERPL-SCNC: 106 MMOL/L (ref 98–107)
CO2 SERPL-SCNC: 22 MMOL/L (ref 22–29)
CREAT SERPL-MCNC: 0.8 MG/DL (ref 0.7–1.2)
EOSINOPHIL # BLD: 0.1 K/UL (ref 0.05–0.5)
EOSINOPHILS RELATIVE PERCENT: 1 % (ref 0–6)
ERYTHROCYTE [DISTWIDTH] IN BLOOD BY AUTOMATED COUNT: 11.9 % (ref 11.5–15)
GFR, ESTIMATED: >90 ML/MIN/1.73M2
GLUCOSE SERPL-MCNC: 97 MG/DL (ref 74–99)
HCT VFR BLD AUTO: 31.1 % (ref 37–54)
HGB BLD-MCNC: 10.6 G/DL (ref 12.5–16.5)
IMM GRANULOCYTES # BLD AUTO: 0.09 K/UL (ref 0–0.58)
IMM GRANULOCYTES NFR BLD: 1 % (ref 0–5)
LYMPHOCYTES NFR BLD: 1.06 K/UL (ref 1.5–4)
LYMPHOCYTES RELATIVE PERCENT: 15 % (ref 20–42)
MCH RBC QN AUTO: 29.6 PG (ref 26–35)
MCHC RBC AUTO-ENTMCNC: 34.1 G/DL (ref 32–34.5)
MCV RBC AUTO: 86.9 FL (ref 80–99.9)
MONOCYTES NFR BLD: 0.57 K/UL (ref 0.1–0.95)
MONOCYTES NFR BLD: 8 % (ref 2–12)
NEUTROPHILS NFR BLD: 74 % (ref 43–80)
NEUTS SEG NFR BLD: 5.26 K/UL (ref 1.8–7.3)
PLATELET # BLD AUTO: 180 K/UL (ref 130–450)
PMV BLD AUTO: 11.4 FL (ref 7–12)
POTASSIUM SERPL-SCNC: 3.7 MMOL/L (ref 3.5–5)
RBC # BLD AUTO: 3.58 M/UL (ref 3.8–5.8)
SODIUM SERPL-SCNC: 139 MMOL/L (ref 132–146)
WBC OTHER # BLD: 7.1 K/UL (ref 4.5–11.5)

## 2024-12-01 PROCEDURE — 36415 COLL VENOUS BLD VENIPUNCTURE: CPT

## 2024-12-01 PROCEDURE — 7100000000 HC PACU RECOVERY - FIRST 15 MIN: Performed by: STUDENT IN AN ORGANIZED HEALTH CARE EDUCATION/TRAINING PROGRAM

## 2024-12-01 PROCEDURE — 2580000003 HC RX 258

## 2024-12-01 PROCEDURE — 3600000016 HC SURGERY LEVEL 6 ADDTL 15MIN: Performed by: STUDENT IN AN ORGANIZED HEALTH CARE EDUCATION/TRAINING PROGRAM

## 2024-12-01 PROCEDURE — 85025 COMPLETE CBC W/AUTO DIFF WBC: CPT

## 2024-12-01 PROCEDURE — 0JBH0ZZ EXCISION OF LEFT LOWER ARM SUBCUTANEOUS TISSUE AND FASCIA, OPEN APPROACH: ICD-10-PCS | Performed by: SURGERY

## 2024-12-01 PROCEDURE — 3700000000 HC ANESTHESIA ATTENDED CARE: Performed by: STUDENT IN AN ORGANIZED HEALTH CARE EDUCATION/TRAINING PROGRAM

## 2024-12-01 PROCEDURE — 6360000002 HC RX W HCPCS

## 2024-12-01 PROCEDURE — 11046 DBRDMT MUSC&/FSCA EA ADDL: CPT | Performed by: STUDENT IN AN ORGANIZED HEALTH CARE EDUCATION/TRAINING PROGRAM

## 2024-12-01 PROCEDURE — 6370000000 HC RX 637 (ALT 250 FOR IP): Performed by: STUDENT IN AN ORGANIZED HEALTH CARE EDUCATION/TRAINING PROGRAM

## 2024-12-01 PROCEDURE — 6360000002 HC RX W HCPCS: Performed by: ANESTHESIOLOGY

## 2024-12-01 PROCEDURE — 3700000001 HC ADD 15 MINUTES (ANESTHESIA): Performed by: STUDENT IN AN ORGANIZED HEALTH CARE EDUCATION/TRAINING PROGRAM

## 2024-12-01 PROCEDURE — 1200000000 HC SEMI PRIVATE

## 2024-12-01 PROCEDURE — 2709999900 HC NON-CHARGEABLE SUPPLY: Performed by: STUDENT IN AN ORGANIZED HEALTH CARE EDUCATION/TRAINING PROGRAM

## 2024-12-01 PROCEDURE — 11043 DBRDMT MUSC&/FSCA 1ST 20/<: CPT | Performed by: STUDENT IN AN ORGANIZED HEALTH CARE EDUCATION/TRAINING PROGRAM

## 2024-12-01 PROCEDURE — 80048 BASIC METABOLIC PNL TOTAL CA: CPT

## 2024-12-01 PROCEDURE — 6370000000 HC RX 637 (ALT 250 FOR IP)

## 2024-12-01 PROCEDURE — 99232 SBSQ HOSP IP/OBS MODERATE 35: CPT | Performed by: SURGERY

## 2024-12-01 PROCEDURE — 2500000003 HC RX 250 WO HCPCS

## 2024-12-01 PROCEDURE — 7100000001 HC PACU RECOVERY - ADDTL 15 MIN: Performed by: STUDENT IN AN ORGANIZED HEALTH CARE EDUCATION/TRAINING PROGRAM

## 2024-12-01 PROCEDURE — 3600000006 HC SURGERY LEVEL 6 BASE: Performed by: STUDENT IN AN ORGANIZED HEALTH CARE EDUCATION/TRAINING PROGRAM

## 2024-12-01 RX ORDER — BISACODYL 10 MG
10 SUPPOSITORY, RECTAL RECTAL DAILY
Status: DISCONTINUED | OUTPATIENT
Start: 2024-12-01 | End: 2024-12-08

## 2024-12-01 RX ORDER — POTASSIUM CHLORIDE 1500 MG/1
40 TABLET, EXTENDED RELEASE ORAL ONCE
Status: COMPLETED | OUTPATIENT
Start: 2024-12-01 | End: 2024-12-01

## 2024-12-01 RX ORDER — DEXAMETHASONE SODIUM PHOSPHATE 10 MG/ML
INJECTION INTRAMUSCULAR; INTRAVENOUS
Status: DISCONTINUED | OUTPATIENT
Start: 2024-12-01 | End: 2024-12-01 | Stop reason: SDUPTHER

## 2024-12-01 RX ORDER — SODIUM CHLORIDE 9 MG/ML
INJECTION, SOLUTION INTRAVENOUS PRN
Status: DISCONTINUED | OUTPATIENT
Start: 2024-12-01 | End: 2024-12-01 | Stop reason: HOSPADM

## 2024-12-01 RX ORDER — ONDANSETRON 2 MG/ML
4 INJECTION INTRAMUSCULAR; INTRAVENOUS
Status: DISCONTINUED | OUTPATIENT
Start: 2024-12-01 | End: 2024-12-01 | Stop reason: HOSPADM

## 2024-12-01 RX ORDER — PROPOFOL 10 MG/ML
INJECTION, EMULSION INTRAVENOUS
Status: DISCONTINUED | OUTPATIENT
Start: 2024-12-01 | End: 2024-12-01 | Stop reason: SDUPTHER

## 2024-12-01 RX ORDER — FENTANYL CITRATE 50 UG/ML
INJECTION, SOLUTION INTRAMUSCULAR; INTRAVENOUS
Status: DISCONTINUED | OUTPATIENT
Start: 2024-12-01 | End: 2024-12-01 | Stop reason: SDUPTHER

## 2024-12-01 RX ORDER — FENTANYL CITRATE 50 UG/ML
50 INJECTION, SOLUTION INTRAMUSCULAR; INTRAVENOUS EVERY 5 MIN PRN
Status: DISCONTINUED | OUTPATIENT
Start: 2024-12-01 | End: 2024-12-01 | Stop reason: HOSPADM

## 2024-12-01 RX ORDER — LIDOCAINE HYDROCHLORIDE 20 MG/ML
INJECTION, SOLUTION INTRAVENOUS
Status: DISCONTINUED | OUTPATIENT
Start: 2024-12-01 | End: 2024-12-01 | Stop reason: SDUPTHER

## 2024-12-01 RX ORDER — SODIUM CHLORIDE 0.9 % (FLUSH) 0.9 %
5-40 SYRINGE (ML) INJECTION EVERY 12 HOURS SCHEDULED
Status: DISCONTINUED | OUTPATIENT
Start: 2024-12-01 | End: 2024-12-01 | Stop reason: HOSPADM

## 2024-12-01 RX ORDER — PHENYLEPHRINE HCL IN 0.9% NACL 1 MG/10 ML
SYRINGE (ML) INTRAVENOUS
Status: DISCONTINUED | OUTPATIENT
Start: 2024-12-01 | End: 2024-12-01 | Stop reason: SDUPTHER

## 2024-12-01 RX ORDER — FENTANYL CITRATE 50 UG/ML
25 INJECTION, SOLUTION INTRAMUSCULAR; INTRAVENOUS EVERY 5 MIN PRN
Status: DISCONTINUED | OUTPATIENT
Start: 2024-12-01 | End: 2024-12-01 | Stop reason: HOSPADM

## 2024-12-01 RX ORDER — SODIUM CHLORIDE 0.9 % (FLUSH) 0.9 %
5-40 SYRINGE (ML) INJECTION PRN
Status: DISCONTINUED | OUTPATIENT
Start: 2024-12-01 | End: 2024-12-01 | Stop reason: HOSPADM

## 2024-12-01 RX ORDER — TRANEXAMIC ACID 10 MG/ML
1000 INJECTION, SOLUTION INTRAVENOUS SEE ADMIN INSTRUCTIONS
Status: DISCONTINUED | OUTPATIENT
Start: 2024-12-02 | End: 2024-12-01 | Stop reason: HOSPADM

## 2024-12-01 RX ORDER — MIDAZOLAM HYDROCHLORIDE 1 MG/ML
INJECTION, SOLUTION INTRAMUSCULAR; INTRAVENOUS
Status: DISCONTINUED | OUTPATIENT
Start: 2024-12-01 | End: 2024-12-01 | Stop reason: SDUPTHER

## 2024-12-01 RX ORDER — ONDANSETRON 2 MG/ML
INJECTION INTRAMUSCULAR; INTRAVENOUS
Status: DISCONTINUED | OUTPATIENT
Start: 2024-12-01 | End: 2024-12-01 | Stop reason: SDUPTHER

## 2024-12-01 RX ORDER — POLYETHYLENE GLYCOL 3350 17 G/17G
17 POWDER, FOR SOLUTION ORAL DAILY
Status: DISCONTINUED | OUTPATIENT
Start: 2024-12-01 | End: 2024-12-05

## 2024-12-01 RX ORDER — NALOXONE HYDROCHLORIDE 0.4 MG/ML
INJECTION, SOLUTION INTRAMUSCULAR; INTRAVENOUS; SUBCUTANEOUS PRN
Status: DISCONTINUED | OUTPATIENT
Start: 2024-12-01 | End: 2024-12-01 | Stop reason: HOSPADM

## 2024-12-01 RX ADMIN — LIDOCAINE HYDROCHLORIDE 100 MG: 20 INJECTION, SOLUTION INTRAVENOUS at 07:23

## 2024-12-01 RX ADMIN — PROPOFOL 200 MG: 10 INJECTION, EMULSION INTRAVENOUS at 07:23

## 2024-12-01 RX ADMIN — MIDAZOLAM 2 MG: 1 INJECTION INTRAMUSCULAR; INTRAVENOUS at 07:21

## 2024-12-01 RX ADMIN — ACETAMINOPHEN 650 MG: 325 TABLET ORAL at 09:33

## 2024-12-01 RX ADMIN — DEXAMETHASONE SODIUM PHOSPHATE 10 MG: 10 INJECTION INTRAMUSCULAR; INTRAVENOUS at 07:30

## 2024-12-01 RX ADMIN — SODIUM CHLORIDE, PRESERVATIVE FREE 10 ML: 5 INJECTION INTRAVENOUS at 20:55

## 2024-12-01 RX ADMIN — ACETAMINOPHEN 650 MG: 325 TABLET ORAL at 20:54

## 2024-12-01 RX ADMIN — CEFAZOLIN 2000 MG: 2 INJECTION, POWDER, FOR SOLUTION INTRAMUSCULAR; INTRAVENOUS at 16:55

## 2024-12-01 RX ADMIN — Medication 50 MCG: at 07:42

## 2024-12-01 RX ADMIN — FENTANYL CITRATE 50 MCG: 50 INJECTION, SOLUTION INTRAMUSCULAR; INTRAVENOUS at 07:34

## 2024-12-01 RX ADMIN — ONDANSETRON 4 MG: 2 INJECTION INTRAMUSCULAR; INTRAVENOUS at 07:39

## 2024-12-01 RX ADMIN — SODIUM CHLORIDE, PRESERVATIVE FREE 10 ML: 5 INJECTION INTRAVENOUS at 09:37

## 2024-12-01 RX ADMIN — METHOCARBAMOL TABLETS 1000 MG: 500 TABLET, COATED ORAL at 20:54

## 2024-12-01 RX ADMIN — METHOCARBAMOL TABLETS 1000 MG: 500 TABLET, COATED ORAL at 14:08

## 2024-12-01 RX ADMIN — POTASSIUM CHLORIDE 40 MEQ: 1500 TABLET, EXTENDED RELEASE ORAL at 09:33

## 2024-12-01 RX ADMIN — Medication 5 MG: at 20:54

## 2024-12-01 RX ADMIN — CEFAZOLIN 2000 MG: 2 INJECTION, POWDER, FOR SOLUTION INTRAMUSCULAR; INTRAVENOUS at 07:33

## 2024-12-01 RX ADMIN — METHOCARBAMOL TABLETS 1000 MG: 500 TABLET, COATED ORAL at 09:33

## 2024-12-01 RX ADMIN — FENTANYL CITRATE 25 MCG: 50 INJECTION INTRAMUSCULAR; INTRAVENOUS at 08:45

## 2024-12-01 RX ADMIN — METHOCARBAMOL TABLETS 1000 MG: 500 TABLET, COATED ORAL at 16:56

## 2024-12-01 RX ADMIN — FENTANYL CITRATE 25 MCG: 50 INJECTION INTRAMUSCULAR; INTRAVENOUS at 08:40

## 2024-12-01 RX ADMIN — Medication 30 MG: at 07:23

## 2024-12-01 RX ADMIN — FENTANYL CITRATE 25 MCG: 50 INJECTION INTRAMUSCULAR; INTRAVENOUS at 08:34

## 2024-12-01 RX ADMIN — SODIUM CHLORIDE: 9 INJECTION, SOLUTION INTRAVENOUS at 07:19

## 2024-12-01 RX ADMIN — ACETAMINOPHEN 650 MG: 325 TABLET ORAL at 05:04

## 2024-12-01 ASSESSMENT — PAIN SCALES - GENERAL
PAINLEVEL_OUTOF10: 0
PAINLEVEL_OUTOF10: 3
PAINLEVEL_OUTOF10: 4
PAINLEVEL_OUTOF10: 6
PAINLEVEL_OUTOF10: 4
PAINLEVEL_OUTOF10: 2
PAINLEVEL_OUTOF10: 7
PAINLEVEL_OUTOF10: 6

## 2024-12-01 ASSESSMENT — PAIN - FUNCTIONAL ASSESSMENT: PAIN_FUNCTIONAL_ASSESSMENT: PREVENTS OR INTERFERES SOME ACTIVE ACTIVITIES AND ADLS

## 2024-12-01 ASSESSMENT — PAIN DESCRIPTION - LOCATION
LOCATION: HIP;ARM
LOCATION: ARM
LOCATION: HIP;ARM
LOCATION: ARM;HIP
LOCATION: ARM
LOCATION: LEG;HIP;ARM

## 2024-12-01 ASSESSMENT — PAIN SCALES - WONG BAKER
WONGBAKER_NUMERICALRESPONSE: NO HURT

## 2024-12-01 ASSESSMENT — PAIN DESCRIPTION - DESCRIPTORS
DESCRIPTORS: ACHING;SORE
DESCRIPTORS: DISCOMFORT;NAGGING;JABBING
DESCRIPTORS: BURNING
DESCRIPTORS: BURNING
DESCRIPTORS: ACHING;SORE;DISCOMFORT
DESCRIPTORS: ACHING;DULL;DISCOMFORT;SORE

## 2024-12-01 ASSESSMENT — PAIN DESCRIPTION - ORIENTATION
ORIENTATION: LEFT

## 2024-12-01 ASSESSMENT — PAIN DESCRIPTION - PAIN TYPE
TYPE: SURGICAL PAIN
TYPE: SURGICAL PAIN

## 2024-12-02 ENCOUNTER — ANESTHESIA EVENT (OUTPATIENT)
Dept: OPERATING ROOM | Age: 59
End: 2024-12-02
Payer: COMMERCIAL

## 2024-12-02 ENCOUNTER — APPOINTMENT (OUTPATIENT)
Dept: GENERAL RADIOLOGY | Age: 59
DRG: 958 | End: 2024-12-02
Payer: COMMERCIAL

## 2024-12-02 ENCOUNTER — ANESTHESIA (OUTPATIENT)
Dept: OPERATING ROOM | Age: 59
End: 2024-12-02
Payer: COMMERCIAL

## 2024-12-02 ENCOUNTER — APPOINTMENT (OUTPATIENT)
Dept: ULTRASOUND IMAGING | Age: 59
DRG: 958 | End: 2024-12-02
Payer: COMMERCIAL

## 2024-12-02 LAB
ANION GAP SERPL CALCULATED.3IONS-SCNC: 11 MMOL/L (ref 7–16)
BASOPHILS # BLD: 0.04 K/UL (ref 0–0.2)
BASOPHILS NFR BLD: 0 % (ref 0–2)
BUN SERPL-MCNC: 22 MG/DL (ref 6–20)
CALCIUM SERPL-MCNC: 9.1 MG/DL (ref 8.6–10.2)
CHLORIDE SERPL-SCNC: 104 MMOL/L (ref 98–107)
CO2 SERPL-SCNC: 24 MMOL/L (ref 22–29)
CREAT SERPL-MCNC: 0.9 MG/DL (ref 0.7–1.2)
EOSINOPHIL # BLD: 0.05 K/UL (ref 0.05–0.5)
EOSINOPHILS RELATIVE PERCENT: 0 % (ref 0–6)
ERYTHROCYTE [DISTWIDTH] IN BLOOD BY AUTOMATED COUNT: 12.1 % (ref 11.5–15)
GFR, ESTIMATED: >90 ML/MIN/1.73M2
GLUCOSE SERPL-MCNC: 98 MG/DL (ref 74–99)
HCT VFR BLD AUTO: 30.6 % (ref 37–54)
HGB BLD-MCNC: 10.5 G/DL (ref 12.5–16.5)
IMM GRANULOCYTES # BLD AUTO: 0.16 K/UL (ref 0–0.58)
IMM GRANULOCYTES NFR BLD: 1 % (ref 0–5)
LYMPHOCYTES NFR BLD: 1.72 K/UL (ref 1.5–4)
LYMPHOCYTES RELATIVE PERCENT: 15 % (ref 20–42)
MCH RBC QN AUTO: 29.7 PG (ref 26–35)
MCHC RBC AUTO-ENTMCNC: 34.3 G/DL (ref 32–34.5)
MCV RBC AUTO: 86.4 FL (ref 80–99.9)
MONOCYTES NFR BLD: 1 K/UL (ref 0.1–0.95)
MONOCYTES NFR BLD: 9 % (ref 2–12)
NEUTROPHILS NFR BLD: 75 % (ref 43–80)
NEUTS SEG NFR BLD: 8.71 K/UL (ref 1.8–7.3)
PLATELET # BLD AUTO: 232 K/UL (ref 130–450)
PMV BLD AUTO: 11 FL (ref 7–12)
POTASSIUM SERPL-SCNC: 4 MMOL/L (ref 3.5–5)
RBC # BLD AUTO: 3.54 M/UL (ref 3.8–5.8)
SODIUM SERPL-SCNC: 139 MMOL/L (ref 132–146)
WBC OTHER # BLD: 11.7 K/UL (ref 4.5–11.5)

## 2024-12-02 PROCEDURE — 6360000002 HC RX W HCPCS: Performed by: STUDENT IN AN ORGANIZED HEALTH CARE EDUCATION/TRAINING PROGRAM

## 2024-12-02 PROCEDURE — 73070 X-RAY EXAM OF ELBOW: CPT

## 2024-12-02 PROCEDURE — 2500000003 HC RX 250 WO HCPCS

## 2024-12-02 PROCEDURE — 85025 COMPLETE CBC W/AUTO DIFF WBC: CPT

## 2024-12-02 PROCEDURE — 99233 SBSQ HOSP IP/OBS HIGH 50: CPT | Performed by: STUDENT IN AN ORGANIZED HEALTH CARE EDUCATION/TRAINING PROGRAM

## 2024-12-02 PROCEDURE — 6360000002 HC RX W HCPCS

## 2024-12-02 PROCEDURE — 2709999900 HC NON-CHARGEABLE SUPPLY: Performed by: ORTHOPAEDIC SURGERY

## 2024-12-02 PROCEDURE — 73110 X-RAY EXAM OF WRIST: CPT

## 2024-12-02 PROCEDURE — 0MQ40ZZ REPAIR LEFT ELBOW BURSA AND LIGAMENT, OPEN APPROACH: ICD-10-PCS | Performed by: ORTHOPAEDIC SURGERY

## 2024-12-02 PROCEDURE — 15271 SKIN SUB GRAFT TRNK/ARM/LEG: CPT | Performed by: ORTHOPAEDIC SURGERY

## 2024-12-02 PROCEDURE — 93970 EXTREMITY STUDY: CPT

## 2024-12-02 PROCEDURE — 3600000005 HC SURGERY LEVEL 5 BASE: Performed by: ORTHOPAEDIC SURGERY

## 2024-12-02 PROCEDURE — 2720000010 HC SURG SUPPLY STERILE: Performed by: ORTHOPAEDIC SURGERY

## 2024-12-02 PROCEDURE — 2580000003 HC RX 258: Performed by: STUDENT IN AN ORGANIZED HEALTH CARE EDUCATION/TRAINING PROGRAM

## 2024-12-02 PROCEDURE — 2580000003 HC RX 258

## 2024-12-02 PROCEDURE — 24615 OPTX AQT/CHRNC ELBOW DISLC: CPT | Performed by: ORTHOPAEDIC SURGERY

## 2024-12-02 PROCEDURE — 3700000001 HC ADD 15 MINUTES (ANESTHESIA): Performed by: ORTHOPAEDIC SURGERY

## 2024-12-02 PROCEDURE — 0QS504Z REPOSITION LEFT ACETABULUM WITH INTERNAL FIXATION DEVICE, OPEN APPROACH: ICD-10-PCS | Performed by: ORTHOPAEDIC SURGERY

## 2024-12-02 PROCEDURE — 36415 COLL VENOUS BLD VENIPUNCTURE: CPT

## 2024-12-02 PROCEDURE — 3700000000 HC ANESTHESIA ATTENDED CARE: Performed by: ORTHOPAEDIC SURGERY

## 2024-12-02 PROCEDURE — 1200000000 HC SEMI PRIVATE

## 2024-12-02 PROCEDURE — 7100000000 HC PACU RECOVERY - FIRST 15 MIN: Performed by: ORTHOPAEDIC SURGERY

## 2024-12-02 PROCEDURE — 0JUH0KZ SUPPLEMENT OF LEFT LOWER ARM SUBCUTANEOUS TISSUE AND FASCIA WITH NONAUTOLOGOUS TISSUE SUBSTITUTE, OPEN APPROACH: ICD-10-PCS | Performed by: ORTHOPAEDIC SURGERY

## 2024-12-02 PROCEDURE — 72170 X-RAY EXAM OF PELVIS: CPT

## 2024-12-02 PROCEDURE — 27228 TREAT HIP FRACTURE(S): CPT | Performed by: ORTHOPAEDIC SURGERY

## 2024-12-02 PROCEDURE — 97605 NEG PRS WND THER DME<=50SQCM: CPT | Performed by: ORTHOPAEDIC SURGERY

## 2024-12-02 PROCEDURE — 3600000015 HC SURGERY LEVEL 5 ADDTL 15MIN: Performed by: ORTHOPAEDIC SURGERY

## 2024-12-02 PROCEDURE — 6360000002 HC RX W HCPCS: Performed by: ORTHOPAEDIC SURGERY

## 2024-12-02 PROCEDURE — 80048 BASIC METABOLIC PNL TOTAL CA: CPT

## 2024-12-02 PROCEDURE — 2500000003 HC RX 250 WO HCPCS: Performed by: ORTHOPAEDIC SURGERY

## 2024-12-02 PROCEDURE — 6370000000 HC RX 637 (ALT 250 FOR IP)

## 2024-12-02 PROCEDURE — C1713 ANCHOR/SCREW BN/BN,TIS/BN: HCPCS | Performed by: ORTHOPAEDIC SURGERY

## 2024-12-02 PROCEDURE — 15002 WOUND PREP TRK/ARM/LEG: CPT | Performed by: ORTHOPAEDIC SURGERY

## 2024-12-02 DEVICE — CORTEX SCREW S.T.: Type: IMPLANTABLE DEVICE | Site: HIP | Status: FUNCTIONAL

## 2024-12-02 DEVICE — GII QUICKANCHOR PLUS SIZE 2 (5 METRIC) ORTHOCORD BRAIDED COMPOSITE SUTURE, 36 INCHES (91CM), ARMED WITH 2 CP-2 NEEDLES, WITH DISPOSABLE INSERTER.
Type: IMPLANTABLE DEVICE | Site: ARM | Status: FUNCTIONAL
Brand: GII QUICKANCHOR ORTHOCORD

## 2024-12-02 DEVICE — CURVED PELVIC PLATE; RADIUS 88
Type: IMPLANTABLE DEVICE | Site: HIP | Status: FUNCTIONAL
Brand: MATTA

## 2024-12-02 RX ORDER — DIPHENHYDRAMINE HYDROCHLORIDE 50 MG/ML
12.5 INJECTION INTRAMUSCULAR; INTRAVENOUS
Status: DISCONTINUED | OUTPATIENT
Start: 2024-12-02 | End: 2024-12-02 | Stop reason: HOSPADM

## 2024-12-02 RX ORDER — SODIUM CHLORIDE 9 MG/ML
INJECTION, SOLUTION INTRAVENOUS
Status: DISCONTINUED | OUTPATIENT
Start: 2024-12-02 | End: 2024-12-02

## 2024-12-02 RX ORDER — SODIUM CHLORIDE 9 MG/ML
INJECTION, SOLUTION INTRAVENOUS PRN
Status: DISCONTINUED | OUTPATIENT
Start: 2024-12-02 | End: 2024-12-02 | Stop reason: HOSPADM

## 2024-12-02 RX ORDER — VANCOMYCIN HYDROCHLORIDE 1 G/20ML
INJECTION, POWDER, LYOPHILIZED, FOR SOLUTION INTRAVENOUS PRN
Status: DISCONTINUED | OUTPATIENT
Start: 2024-12-02 | End: 2024-12-02 | Stop reason: ALTCHOICE

## 2024-12-02 RX ORDER — HYDROMORPHONE HYDROCHLORIDE 2 MG/ML
INJECTION, SOLUTION INTRAMUSCULAR; INTRAVENOUS; SUBCUTANEOUS
Status: DISCONTINUED | OUTPATIENT
Start: 2024-12-02 | End: 2024-12-02 | Stop reason: SDUPTHER

## 2024-12-02 RX ORDER — LIDOCAINE HYDROCHLORIDE 20 MG/ML
INJECTION, SOLUTION INTRAVENOUS
Status: DISCONTINUED | OUTPATIENT
Start: 2024-12-02 | End: 2024-12-02 | Stop reason: SDUPTHER

## 2024-12-02 RX ORDER — SODIUM CHLORIDE 0.9 % (FLUSH) 0.9 %
5-40 SYRINGE (ML) INJECTION EVERY 12 HOURS SCHEDULED
Status: DISCONTINUED | OUTPATIENT
Start: 2024-12-02 | End: 2024-12-02 | Stop reason: HOSPADM

## 2024-12-02 RX ORDER — MEPERIDINE HYDROCHLORIDE 25 MG/ML
12.5 INJECTION INTRAMUSCULAR; INTRAVENOUS; SUBCUTANEOUS EVERY 5 MIN PRN
Status: DISCONTINUED | OUTPATIENT
Start: 2024-12-02 | End: 2024-12-02 | Stop reason: HOSPADM

## 2024-12-02 RX ORDER — ONDANSETRON 2 MG/ML
4 INJECTION INTRAMUSCULAR; INTRAVENOUS
Status: DISCONTINUED | OUTPATIENT
Start: 2024-12-02 | End: 2024-12-02 | Stop reason: HOSPADM

## 2024-12-02 RX ORDER — SODIUM CHLORIDE, SODIUM LACTATE, POTASSIUM CHLORIDE, CALCIUM CHLORIDE 600; 310; 30; 20 MG/100ML; MG/100ML; MG/100ML; MG/100ML
INJECTION, SOLUTION INTRAVENOUS
Status: DISCONTINUED | OUTPATIENT
Start: 2024-12-02 | End: 2024-12-02 | Stop reason: SDUPTHER

## 2024-12-02 RX ORDER — TRANEXAMIC ACID 10 MG/ML
INJECTION, SOLUTION INTRAVENOUS
Status: DISCONTINUED | OUTPATIENT
Start: 2024-12-02 | End: 2024-12-02 | Stop reason: SDUPTHER

## 2024-12-02 RX ORDER — DROPERIDOL 2.5 MG/ML
0.62 INJECTION, SOLUTION INTRAMUSCULAR; INTRAVENOUS
Status: DISCONTINUED | OUTPATIENT
Start: 2024-12-02 | End: 2024-12-02 | Stop reason: HOSPADM

## 2024-12-02 RX ORDER — HYDRALAZINE HYDROCHLORIDE 20 MG/ML
5 INJECTION INTRAMUSCULAR; INTRAVENOUS
Status: DISCONTINUED | OUTPATIENT
Start: 2024-12-02 | End: 2024-12-02 | Stop reason: HOSPADM

## 2024-12-02 RX ORDER — SODIUM CHLORIDE 0.9 % (FLUSH) 0.9 %
5-40 SYRINGE (ML) INJECTION PRN
Status: DISCONTINUED | OUTPATIENT
Start: 2024-12-02 | End: 2024-12-02 | Stop reason: HOSPADM

## 2024-12-02 RX ORDER — NALOXONE HYDROCHLORIDE 0.4 MG/ML
INJECTION, SOLUTION INTRAMUSCULAR; INTRAVENOUS; SUBCUTANEOUS PRN
Status: DISCONTINUED | OUTPATIENT
Start: 2024-12-02 | End: 2024-12-02 | Stop reason: HOSPADM

## 2024-12-02 RX ORDER — IPRATROPIUM BROMIDE AND ALBUTEROL SULFATE 2.5; .5 MG/3ML; MG/3ML
1 SOLUTION RESPIRATORY (INHALATION)
Status: DISCONTINUED | OUTPATIENT
Start: 2024-12-02 | End: 2024-12-02 | Stop reason: HOSPADM

## 2024-12-02 RX ORDER — ACETAMINOPHEN 325 MG/1
650 TABLET ORAL
Status: DISCONTINUED | OUTPATIENT
Start: 2024-12-02 | End: 2024-12-02 | Stop reason: HOSPADM

## 2024-12-02 RX ORDER — CEFAZOLIN SODIUM 1 G/3ML
INJECTION, POWDER, FOR SOLUTION INTRAMUSCULAR; INTRAVENOUS
Status: DISCONTINUED | OUTPATIENT
Start: 2024-12-02 | End: 2024-12-02 | Stop reason: SDUPTHER

## 2024-12-02 RX ORDER — ROCURONIUM BROMIDE 10 MG/ML
INJECTION, SOLUTION INTRAVENOUS
Status: DISCONTINUED | OUTPATIENT
Start: 2024-12-02 | End: 2024-12-02 | Stop reason: SDUPTHER

## 2024-12-02 RX ORDER — FENTANYL CITRATE 50 UG/ML
INJECTION, SOLUTION INTRAMUSCULAR; INTRAVENOUS
Status: DISCONTINUED | OUTPATIENT
Start: 2024-12-02 | End: 2024-12-02 | Stop reason: SDUPTHER

## 2024-12-02 RX ORDER — HYDROMORPHONE HYDROCHLORIDE 1 MG/ML
0.5 INJECTION, SOLUTION INTRAMUSCULAR; INTRAVENOUS; SUBCUTANEOUS EVERY 5 MIN PRN
Status: DISCONTINUED | OUTPATIENT
Start: 2024-12-02 | End: 2024-12-02 | Stop reason: HOSPADM

## 2024-12-02 RX ORDER — LABETALOL HYDROCHLORIDE 5 MG/ML
5 INJECTION, SOLUTION INTRAVENOUS
Status: DISCONTINUED | OUTPATIENT
Start: 2024-12-02 | End: 2024-12-02 | Stop reason: HOSPADM

## 2024-12-02 RX ORDER — TRANEXAMIC ACID 10 MG/ML
1000 INJECTION, SOLUTION INTRAVENOUS ONCE
Status: COMPLETED | OUTPATIENT
Start: 2024-12-02 | End: 2024-12-02

## 2024-12-02 RX ORDER — DEXAMETHASONE SODIUM PHOSPHATE 10 MG/ML
INJECTION INTRAMUSCULAR; INTRAVENOUS
Status: DISCONTINUED | OUTPATIENT
Start: 2024-12-02 | End: 2024-12-02 | Stop reason: SDUPTHER

## 2024-12-02 RX ORDER — PHENYLEPHRINE HCL IN 0.9% NACL 1 MG/10 ML
SYRINGE (ML) INTRAVENOUS
Status: DISCONTINUED | OUTPATIENT
Start: 2024-12-02 | End: 2024-12-02 | Stop reason: SDUPTHER

## 2024-12-02 RX ORDER — PROPOFOL 10 MG/ML
INJECTION, EMULSION INTRAVENOUS
Status: DISCONTINUED | OUTPATIENT
Start: 2024-12-02 | End: 2024-12-02 | Stop reason: SDUPTHER

## 2024-12-02 RX ORDER — ONDANSETRON 2 MG/ML
INJECTION INTRAMUSCULAR; INTRAVENOUS
Status: DISCONTINUED | OUTPATIENT
Start: 2024-12-02 | End: 2024-12-02 | Stop reason: SDUPTHER

## 2024-12-02 RX ORDER — HYDROMORPHONE HYDROCHLORIDE 1 MG/ML
0.25 INJECTION, SOLUTION INTRAMUSCULAR; INTRAVENOUS; SUBCUTANEOUS EVERY 5 MIN PRN
Status: DISCONTINUED | OUTPATIENT
Start: 2024-12-02 | End: 2024-12-02 | Stop reason: HOSPADM

## 2024-12-02 RX ORDER — MIDAZOLAM HYDROCHLORIDE 2 MG/2ML
2 INJECTION, SOLUTION INTRAMUSCULAR; INTRAVENOUS
Status: DISCONTINUED | OUTPATIENT
Start: 2024-12-02 | End: 2024-12-02 | Stop reason: HOSPADM

## 2024-12-02 RX ORDER — MIDAZOLAM HYDROCHLORIDE 1 MG/ML
INJECTION, SOLUTION INTRAMUSCULAR; INTRAVENOUS
Status: DISCONTINUED | OUTPATIENT
Start: 2024-12-02 | End: 2024-12-02 | Stop reason: SDUPTHER

## 2024-12-02 RX ADMIN — FENTANYL CITRATE 50 MCG: 0.05 INJECTION, SOLUTION INTRAMUSCULAR; INTRAVENOUS at 13:47

## 2024-12-02 RX ADMIN — HYDROMORPHONE HYDROCHLORIDE 0.5 MG: 2 INJECTION, SOLUTION INTRAMUSCULAR; INTRAVENOUS; SUBCUTANEOUS at 15:08

## 2024-12-02 RX ADMIN — FENTANYL CITRATE 50 MCG: 0.05 INJECTION, SOLUTION INTRAMUSCULAR; INTRAVENOUS at 12:27

## 2024-12-02 RX ADMIN — FENTANYL CITRATE 100 MCG: 0.05 INJECTION, SOLUTION INTRAMUSCULAR; INTRAVENOUS at 11:55

## 2024-12-02 RX ADMIN — ROCURONIUM BROMIDE 25 MG: 10 INJECTION, SOLUTION INTRAVENOUS at 13:11

## 2024-12-02 RX ADMIN — ROCURONIUM BROMIDE 50 MG: 10 INJECTION, SOLUTION INTRAVENOUS at 11:55

## 2024-12-02 RX ADMIN — SUGAMMADEX 200 MG: 100 INJECTION, SOLUTION INTRAVENOUS at 14:45

## 2024-12-02 RX ADMIN — ONDANSETRON 4 MG: 2 INJECTION INTRAMUSCULAR; INTRAVENOUS at 14:43

## 2024-12-02 RX ADMIN — WATER 2000 MG: 1 INJECTION INTRAMUSCULAR; INTRAVENOUS; SUBCUTANEOUS at 21:35

## 2024-12-02 RX ADMIN — PROPOFOL 150 MG: 10 INJECTION, EMULSION INTRAVENOUS at 11:55

## 2024-12-02 RX ADMIN — TRANEXAMIC ACID 1000 MG: 10 INJECTION, SOLUTION INTRAVENOUS at 16:05

## 2024-12-02 RX ADMIN — ROCURONIUM BROMIDE 20 MG: 10 INJECTION, SOLUTION INTRAVENOUS at 13:40

## 2024-12-02 RX ADMIN — OXYCODONE HYDROCHLORIDE 5 MG: 5 TABLET ORAL at 21:35

## 2024-12-02 RX ADMIN — SODIUM CHLORIDE, PRESERVATIVE FREE 10 ML: 5 INJECTION INTRAVENOUS at 21:36

## 2024-12-02 RX ADMIN — CEFAZOLIN 2000 MG: 2 INJECTION, POWDER, FOR SOLUTION INTRAMUSCULAR; INTRAVENOUS at 00:45

## 2024-12-02 RX ADMIN — SODIUM CHLORIDE, POTASSIUM CHLORIDE, SODIUM LACTATE AND CALCIUM CHLORIDE: 600; 310; 30; 20 INJECTION, SOLUTION INTRAVENOUS at 11:49

## 2024-12-02 RX ADMIN — DEXAMETHASONE SODIUM PHOSPHATE 10 MG: 10 INJECTION INTRAMUSCULAR; INTRAVENOUS at 12:09

## 2024-12-02 RX ADMIN — MIDAZOLAM 2 MG: 1 INJECTION INTRAMUSCULAR; INTRAVENOUS at 11:49

## 2024-12-02 RX ADMIN — CEFAZOLIN 2 G: 1 INJECTION, POWDER, FOR SOLUTION INTRAMUSCULAR; INTRAVENOUS at 12:03

## 2024-12-02 RX ADMIN — SODIUM CHLORIDE, PRESERVATIVE FREE 10 ML: 5 INJECTION INTRAVENOUS at 09:38

## 2024-12-02 RX ADMIN — HYDROMORPHONE HYDROCHLORIDE 1 MG: 2 INJECTION, SOLUTION INTRAMUSCULAR; INTRAVENOUS; SUBCUTANEOUS at 14:47

## 2024-12-02 RX ADMIN — Medication 100 MCG: at 12:06

## 2024-12-02 RX ADMIN — HYDROMORPHONE HYDROCHLORIDE 0.5 MG: 1 INJECTION, SOLUTION INTRAMUSCULAR; INTRAVENOUS; SUBCUTANEOUS at 18:53

## 2024-12-02 RX ADMIN — LIDOCAINE HYDROCHLORIDE 60 MG: 20 INJECTION, SOLUTION INTRAVENOUS at 11:55

## 2024-12-02 RX ADMIN — TRANEXAMIC ACID 1 G: 10 INJECTION, SOLUTION INTRAVENOUS at 12:23

## 2024-12-02 RX ADMIN — ROCURONIUM BROMIDE 25 MG: 10 INJECTION, SOLUTION INTRAVENOUS at 12:42

## 2024-12-02 RX ADMIN — ACETAMINOPHEN 650 MG: 325 TABLET ORAL at 21:34

## 2024-12-02 RX ADMIN — ACETAMINOPHEN 650 MG: 325 TABLET ORAL at 02:39

## 2024-12-02 RX ADMIN — Medication 100 MCG: at 12:16

## 2024-12-02 RX ADMIN — HYDROMORPHONE HYDROCHLORIDE 0.5 MG: 2 INJECTION, SOLUTION INTRAMUSCULAR; INTRAVENOUS; SUBCUTANEOUS at 14:53

## 2024-12-02 RX ADMIN — METHOCARBAMOL TABLETS 1000 MG: 500 TABLET, COATED ORAL at 21:34

## 2024-12-02 RX ADMIN — Medication 5 MG: at 21:34

## 2024-12-02 RX ADMIN — METHOCARBAMOL TABLETS 1000 MG: 500 TABLET, COATED ORAL at 17:20

## 2024-12-02 RX ADMIN — ENOXAPARIN SODIUM 30 MG: 100 INJECTION SUBCUTANEOUS at 21:35

## 2024-12-02 RX ADMIN — SODIUM CHLORIDE, POTASSIUM CHLORIDE, SODIUM LACTATE AND CALCIUM CHLORIDE: 600; 310; 30; 20 INJECTION, SOLUTION INTRAVENOUS at 13:37

## 2024-12-02 RX ADMIN — FENTANYL CITRATE 50 MCG: 0.05 INJECTION, SOLUTION INTRAMUSCULAR; INTRAVENOUS at 14:07

## 2024-12-02 RX ADMIN — OXYCODONE HYDROCHLORIDE 10 MG: 10 TABLET ORAL at 17:19

## 2024-12-02 ASSESSMENT — PAIN DESCRIPTION - DESCRIPTORS
DESCRIPTORS: ACHING;DISCOMFORT;DULL
DESCRIPTORS: ACHING;DISCOMFORT;DULL
DESCRIPTORS: ACHING;DISCOMFORT;DULL;SORE

## 2024-12-02 ASSESSMENT — PAIN DESCRIPTION - ORIENTATION
ORIENTATION: LEFT
ORIENTATION: LEFT;LOWER
ORIENTATION: LEFT;LOWER
ORIENTATION: LEFT

## 2024-12-02 ASSESSMENT — PAIN DESCRIPTION - LOCATION
LOCATION: ARM;HIP;LEG
LOCATION: HIP;ARM
LOCATION: BACK

## 2024-12-02 ASSESSMENT — PAIN SCALES - GENERAL
PAINLEVEL_OUTOF10: 3
PAINLEVEL_OUTOF10: 7
PAINLEVEL_OUTOF10: 2
PAINLEVEL_OUTOF10: 6

## 2024-12-02 ASSESSMENT — PAIN - FUNCTIONAL ASSESSMENT
PAIN_FUNCTIONAL_ASSESSMENT: PREVENTS OR INTERFERES SOME ACTIVE ACTIVITIES AND ADLS
PAIN_FUNCTIONAL_ASSESSMENT: PREVENTS OR INTERFERES SOME ACTIVE ACTIVITIES AND ADLS

## 2024-12-02 ASSESSMENT — ENCOUNTER SYMPTOMS: SHORTNESS OF BREATH: 0

## 2024-12-02 NOTE — OP NOTE
Operative Note      Patient: Issac Ramirez  YOB: 1965  MRN: [id number]    Date of Procedure: 12/1/2024    Diagnosis  S/p ORIF grade 3A left open intra-articular distal radius fracture, grade 3 open distal ulnar shaft fracture with 6 cm x 5 cm open wound with exposed muscle belly and tendon    Post-Op Diagnosis: Same       Procedure: Irrigation debridement including skin, subcutaneous tissue, muscle, fascia of 6 cm x 5 cm left volar distal forearm wound    Surgeon(s):  Jamie Maher DO    Assistant:   Resident: Octavio Hayward DO; Nick Bassett DO    Anesthesia: General    Estimated Blood Loss (mL): Minimal    Complications: None    Specimens:   * No specimens in log *    Implants:  * No implants in log *      Drains:   Negative Pressure Wound Therapy Wrist Left (Active)   Dressing Status Intact w/seal 12/01/24 0745   Canister changed? Yes 12/01/24 0745   Target Pressure (mmHg) 125 11/30/24 2020   Dressing Type Black foam 12/01/24 0745       Urinary Catheter 11/26/24 (Active)   $ Urethral catheter insertion Inserted for procedure 11/30/24 0907   Catheter Indications Perioperative use for selected surgical procedures 12/01/24 0510   Site Assessment No urethral drainage 12/01/24 0510   Urine Color Yellow 12/01/24 0510   Urine Appearance Clear 12/01/24 0510   Collection Container Standard 12/01/24 0510   Securement Method Securing device (Describe) 12/01/24 0510   Catheter Care  Soap and water 11/30/24 2359   Catheter Best Practices  Drainage tube clipped to bed 12/01/24 0510   Status Draining;Patent 12/01/24 0510   Output (mL) 1000 mL 12/01/24 0509       Findings:  Infection Present At Time Of Surgery (PATOS) (choose all levels that have infection present):  No infection present  Other Findings: See below    Detailed Description of Procedure:     Indications: Patient was extremity by 5 cm open wound on the volar surface of the forearm.  He had irrigation and debridement previously with application 
Operative Note      Patient: Issac Ramirez  YOB: 1965  MRN: [id number]    Date of Procedure: 11/27/2024    Diagnosis:  1.  Left anterior column, posterior hemitransverse acetabular fracture    2.  Grade 3A open intra-articular distal radius fracture    3.  Grade 3 open distal ulnar shaft fracture    4.  Closed elbow dislocation, left        Post-Op Diagnosis: Same with conversion of distal radius fracture to potential grade 3B      Procedure:  1.  Irrigation and debridement including skin, subcutaneous tissue, muscle, and bone left grade 3A open intra-articular distal radius fracture    2.  Irrigation and debridement including skin, subtenons tissue, muscle, bone left grade 3 open distal ulnar shaft fracture    3.  Open reduction internal fixation intra-articular open distal radius fracture greater than 3 fragments    4.  Open reduction internal fixation left distal ulnar shaft fracture    5.  Fluoroscopic evaluation left elbow joint under anesthesia and treatment of left elbow dislocation closed    Surgeon(s):  Arthur Daigle MD    Assistant:   Resident: Jayesh Melgar DO    Anesthesia: General    Estimated Blood Loss (mL): less than 100     Complications: None    Specimens:   * No specimens in log *    Implants:  Implant Name Type Inv. Item Serial No.  Lot No. LRB No. Used Action   VOLT Mini fragment 10 holes, 88mm      Left 1 Implanted   2.4 mm VOLT cortex screw 13 mm      Left 1 Implanted   2.4 mm VOLT cortex screw 14mm      Left 3 Implanted   2.4 mm VOLT cortex screw 17mm      Left 2 Implanted   2.4 mm VOLT cortex screw 18mm      Left 1 Implanted   2.4 mm VOLT Locking screw      Left 2 Implanted   PLATE BNE [id number] STD 6X4 H ST L DST RAD VOLAR S STL MARCOS - [id number]  PLATE BNE [id number] STD 6X4 H ST L DST RAD VOLAR S STL MARCOS  [employer]-  Left 1 Implanted   SCREW BNE L20MM DIA2.4MM DST RAD VOLAR S STL ST MARCOS ANG DELIA - [id number]  SCREW BNE L20MM DIA2.4MM DST 
fall from a ladder while putting up [place] lights.  Found to have a grade 2 open left distal radius and ulna fracture, closed left elbow dislocation as well as a closed, left associated both column acetabular fracture.  His elbow was reduced in the left distal radius and ulna was washed out last week.  Due to the swelling the volar incision was unable to be completely closed.  He was wound VAC and taken back to the OR for repeat irrigation debridement over the weekend.  He has been hemodynamically stable in regards to his intrapelvic hematoma.  He has been adequately resuscitated and plans to proceed with ORIF of left acetabulum fracture, repeat irrigation debridement of the left volar wrist wound with application of xenograft and negative pressure wound therapy.  As well as examination under anesthesia of the persistently unstable left elbow dislocation with possible lateral ligamentous reconstruction.  Risk, benefits as well as tenderness to therapy were discussed with the patient.  He was agreeable to proceed with the above listed procedures.    Detailed Description of Procedure:   Patient was seen and examined outside the operative theater by the operative surgeon, the left upper and left lower extremities were initialed.  Patient was then brought into the operative theater and underwent a general anesthetic by the part of anesthesia.  He was then transferred to the operative table by multiple members of the surgical team in a safe fashion with anesthesia controlled the C-spine airway.  All bony prominences were identified well-padded.  The left lower extremity and pelvis were prepped and draped in typical orthopedic sterile fashion.  Antibiotics were administered in the form of Ancef.  Surgical timeout was performed with everyone in agreement upon the patient, laterality: Left, and procedure be performed: Reduction to fixation left acetabulum fracture, irrigation debridement left wrist wound with application

## 2024-12-02 NOTE — ANESTHESIA PRE PROCEDURE
Department of Anesthesiology  Preprocedure Note       Name:  Issac Ramirez   Age:  59 y.o.  :  1965                                          MRN:  [id number]         Date:  2024      Surgeon: Surgeon(s):  yR Castro DO    Procedure: Procedure(s):  LEFT ACETABULUM OPEN REDUCTION INTERNAL FIXATION, Irrigation and debridement left forearm with grafting keracis    Medications prior to admission:   Prior to Admission medications    Medication Sig Start Date End Date Taking? Authorizing Provider   oxyCODONE-acetaminophen (PERCOCET) 5-325 MG per tablet Take 1 tablet by mouth every 6 hours as needed for Pain for up to 7 days. Intended supply: 7 days. Take lowest dose possible to manage pain Max Daily Amount: 4 tablets 24 Yes Jayesh Melgar DO   enoxaparin (LOVENOX) 40 MG/0.4ML Inject 0.4 mLs into the skin 2 times daily 24 Yes Jayesh Melgar DO   finasteride (PROPECIA) 1 MG tablet Take 1 tablet by mouth daily    ProviderSera MD   minoxidil (LONITEN) 2.5 MG tablet Take 0.625 mg by mouth daily    ProviderSera MD       Current medications:    Current Facility-Administered Medications   Medication Dose Route Frequency Provider Last Rate Last Admin    polyethylene glycol (GLYCOLAX) packet 17 g  17 g Oral Daily Jaclyn Mullins MD        bisacodyl (DULCOLAX) suppository 10 mg  10 mg Rectal Daily Jaclyn Mullins MD        senna (SENOKOT) tablet 8.6 mg  1 tablet Oral BID Rubi Cunningham MD   8.6 mg at 24 0840    enoxaparin Sodium (LOVENOX) injection 30 mg  30 mg SubCUTAneous BID Ravindra Cantu MD   30 mg at 24 0840    sodium chloride flush 0.9 % injection 10 mL  10 mL IntraVENous 2 times per day Kimberlyn Rivas MD   10 mL at 24 0938    sodium chloride flush 0.9 % injection 10 mL  10 mL IntraVENous PRN Kimberlyn Rivas MD   10 mL at 24 1547    0.9 % sodium chloride infusion   IntraVENous PRN Kimberlyn Rivas MD   Stopped

## 2024-12-02 NOTE — CARE COORDINATION
CM update note.  Patient has been off the unit most of the day in surgery for left acetabulum ORIF and I&D of left forearm with grafting.  Will follow up with him tomorrow for transition of care.  S/P irrigation and debridement with ORIF of the left distal radius and ulnar shaft fractures on 11/27  Wound Vac in place.  S/p repeat I&D of LUE on 12/1.  CM/SW to follow.  Jan Damico RN -[phone]

## 2024-12-03 LAB
ABO/RH: NORMAL
ANION GAP SERPL CALCULATED.3IONS-SCNC: 8 MMOL/L (ref 7–16)
ANTIBODY SCREEN: NEGATIVE
ARM BAND NUMBER: NORMAL
BASOPHILS # BLD: 0.02 K/UL (ref 0–0.2)
BASOPHILS NFR BLD: 0 % (ref 0–2)
BLOOD BANK DISPENSE STATUS: NORMAL
BLOOD BANK DISPENSE STATUS: NORMAL
BLOOD BANK SAMPLE EXPIRATION: NORMAL
BPU ID: NORMAL
BPU ID: NORMAL
BUN SERPL-MCNC: 21 MG/DL (ref 6–20)
CALCIUM SERPL-MCNC: 8.7 MG/DL (ref 8.6–10.2)
CHLORIDE SERPL-SCNC: 103 MMOL/L (ref 98–107)
CO2 SERPL-SCNC: 27 MMOL/L (ref 22–29)
COMPONENT: NORMAL
COMPONENT: NORMAL
CREAT SERPL-MCNC: 0.9 MG/DL (ref 0.7–1.2)
CROSSMATCH RESULT: NORMAL
CROSSMATCH RESULT: NORMAL
EOSINOPHIL # BLD: 0.02 K/UL (ref 0.05–0.5)
EOSINOPHILS RELATIVE PERCENT: 0 % (ref 0–6)
ERYTHROCYTE [DISTWIDTH] IN BLOOD BY AUTOMATED COUNT: 12.3 % (ref 11.5–15)
GFR, ESTIMATED: >90 ML/MIN/1.73M2
GLUCOSE SERPL-MCNC: 103 MG/DL (ref 74–99)
HCT VFR BLD AUTO: 28.2 % (ref 37–54)
HGB BLD-MCNC: 9.4 G/DL (ref 12.5–16.5)
IMM GRANULOCYTES # BLD AUTO: 0.25 K/UL (ref 0–0.58)
IMM GRANULOCYTES NFR BLD: 2 % (ref 0–5)
LYMPHOCYTES NFR BLD: 1.29 K/UL (ref 1.5–4)
LYMPHOCYTES RELATIVE PERCENT: 11 % (ref 20–42)
MCH RBC QN AUTO: 29.4 PG (ref 26–35)
MCHC RBC AUTO-ENTMCNC: 33.3 G/DL (ref 32–34.5)
MCV RBC AUTO: 88.1 FL (ref 80–99.9)
MONOCYTES NFR BLD: 1.09 K/UL (ref 0.1–0.95)
MONOCYTES NFR BLD: 10 % (ref 2–12)
NEUTROPHILS NFR BLD: 76 % (ref 43–80)
NEUTS SEG NFR BLD: 8.64 K/UL (ref 1.8–7.3)
PLATELET # BLD AUTO: 242 K/UL (ref 130–450)
PMV BLD AUTO: 11.2 FL (ref 7–12)
POTASSIUM SERPL-SCNC: 4.2 MMOL/L (ref 3.5–5)
RBC # BLD AUTO: 3.2 M/UL (ref 3.8–5.8)
SODIUM SERPL-SCNC: 138 MMOL/L (ref 132–146)
TRANSFUSION STATUS: NORMAL
TRANSFUSION STATUS: NORMAL
UNIT DIVISION: 0
UNIT DIVISION: 0
WBC OTHER # BLD: 11.3 K/UL (ref 4.5–11.5)

## 2024-12-03 PROCEDURE — 97530 THERAPEUTIC ACTIVITIES: CPT

## 2024-12-03 PROCEDURE — 80048 BASIC METABOLIC PNL TOTAL CA: CPT

## 2024-12-03 PROCEDURE — 6370000000 HC RX 637 (ALT 250 FOR IP)

## 2024-12-03 PROCEDURE — 2580000003 HC RX 258

## 2024-12-03 PROCEDURE — 97535 SELF CARE MNGMENT TRAINING: CPT

## 2024-12-03 PROCEDURE — 36415 COLL VENOUS BLD VENIPUNCTURE: CPT

## 2024-12-03 PROCEDURE — 85025 COMPLETE CBC W/AUTO DIFF WBC: CPT

## 2024-12-03 PROCEDURE — 1200000000 HC SEMI PRIVATE

## 2024-12-03 PROCEDURE — 99232 SBSQ HOSP IP/OBS MODERATE 35: CPT | Performed by: STUDENT IN AN ORGANIZED HEALTH CARE EDUCATION/TRAINING PROGRAM

## 2024-12-03 PROCEDURE — 6370000000 HC RX 637 (ALT 250 FOR IP): Performed by: STUDENT IN AN ORGANIZED HEALTH CARE EDUCATION/TRAINING PROGRAM

## 2024-12-03 PROCEDURE — 6360000002 HC RX W HCPCS: Performed by: STUDENT IN AN ORGANIZED HEALTH CARE EDUCATION/TRAINING PROGRAM

## 2024-12-03 PROCEDURE — 6360000002 HC RX W HCPCS

## 2024-12-03 PROCEDURE — 2580000003 HC RX 258: Performed by: STUDENT IN AN ORGANIZED HEALTH CARE EDUCATION/TRAINING PROGRAM

## 2024-12-03 RX ORDER — KETOROLAC TROMETHAMINE 30 MG/ML
30 INJECTION, SOLUTION INTRAMUSCULAR; INTRAVENOUS ONCE
Status: COMPLETED | OUTPATIENT
Start: 2024-12-03 | End: 2024-12-03

## 2024-12-03 RX ADMIN — METHOCARBAMOL TABLETS 1000 MG: 500 TABLET, COATED ORAL at 09:05

## 2024-12-03 RX ADMIN — SODIUM CHLORIDE, PRESERVATIVE FREE 10 ML: 5 INJECTION INTRAVENOUS at 09:06

## 2024-12-03 RX ADMIN — KETOROLAC TROMETHAMINE 30 MG: 30 INJECTION, SOLUTION INTRAMUSCULAR at 15:08

## 2024-12-03 RX ADMIN — Medication 5 MG: at 20:50

## 2024-12-03 RX ADMIN — OXYCODONE HYDROCHLORIDE 5 MG: 5 TABLET ORAL at 04:31

## 2024-12-03 RX ADMIN — SENNOSIDES 8.6 MG: 8.6 TABLET, FILM COATED ORAL at 09:05

## 2024-12-03 RX ADMIN — ENOXAPARIN SODIUM 30 MG: 100 INJECTION SUBCUTANEOUS at 09:04

## 2024-12-03 RX ADMIN — WATER 2000 MG: 1 INJECTION INTRAMUSCULAR; INTRAVENOUS; SUBCUTANEOUS at 04:29

## 2024-12-03 RX ADMIN — METHOCARBAMOL TABLETS 1000 MG: 500 TABLET, COATED ORAL at 13:13

## 2024-12-03 RX ADMIN — ACETAMINOPHEN 650 MG: 325 TABLET ORAL at 20:49

## 2024-12-03 RX ADMIN — METHOCARBAMOL TABLETS 1000 MG: 500 TABLET, COATED ORAL at 17:46

## 2024-12-03 RX ADMIN — ACETAMINOPHEN 650 MG: 325 TABLET ORAL at 09:07

## 2024-12-03 RX ADMIN — ACETAMINOPHEN 650 MG: 325 TABLET ORAL at 04:28

## 2024-12-03 RX ADMIN — SODIUM CHLORIDE, PRESERVATIVE FREE 10 ML: 5 INJECTION INTRAVENOUS at 20:50

## 2024-12-03 RX ADMIN — POLYETHYLENE GLYCOL 3350 17 G: 17 POWDER, FOR SOLUTION ORAL at 09:06

## 2024-12-03 RX ADMIN — METHOCARBAMOL TABLETS 1000 MG: 500 TABLET, COATED ORAL at 20:50

## 2024-12-03 SDOH — HEALTH STABILITY: PHYSICAL HEALTH: ON AVERAGE, HOW MANY MINUTES DO YOU ENGAGE IN EXERCISE AT THIS LEVEL?: 30 MIN

## 2024-12-03 SDOH — HEALTH STABILITY: PHYSICAL HEALTH: ON AVERAGE, HOW MANY DAYS PER WEEK DO YOU ENGAGE IN MODERATE TO STRENUOUS EXERCISE (LIKE A BRISK WALK)?: 3 DAYS

## 2024-12-03 ASSESSMENT — SOCIAL DETERMINANTS OF HEALTH (SDOH)
HOW OFTEN DO YOU ATTEND CHURCH OR RELIGIOUS SERVICES?: 1 TO 4 TIMES PER YEAR
WITHIN THE LAST YEAR, HAVE YOU BEEN HUMILIATED OR EMOTIONALLY ABUSED IN OTHER WAYS BY YOUR PARTNER OR EX-PARTNER?: NO
HOW OFTEN DO YOU GET TOGETHER WITH FRIENDS OR RELATIVES?: MORE THAN THREE TIMES A WEEK
DO YOU BELONG TO ANY CLUBS OR ORGANIZATIONS SUCH AS CHURCH GROUPS UNIONS, FRATERNAL OR ATHLETIC GROUPS, OR SCHOOL GROUPS?: NO
HOW HARD IS IT FOR YOU TO PAY FOR THE VERY BASICS LIKE FOOD, HOUSING, MEDICAL CARE, AND HEATING?: NOT HARD AT ALL
WITHIN THE LAST YEAR, HAVE YOU BEEN KICKED, HIT, SLAPPED, OR OTHERWISE PHYSICALLY HURT BY YOUR PARTNER OR EX-PARTNER?: NO
WITHIN THE LAST YEAR, HAVE YOU BEEN AFRAID OF YOUR PARTNER OR EX-PARTNER?: NO
HOW OFTEN DO YOU ATTENT MEETINGS OF THE CLUB OR ORGANIZATION YOU BELONG TO?: 1 TO 4 TIMES PER YEAR
IN A TYPICAL WEEK, HOW MANY TIMES DO YOU TALK ON THE PHONE WITH FAMILY, FRIENDS, OR NEIGHBORS?: MORE THAN THREE TIMES A WEEK
WITHIN THE LAST YEAR, HAVE TO BEEN RAPED OR FORCED TO HAVE ANY KIND OF SEXUAL ACTIVITY BY YOUR PARTNER OR EX-PARTNER?: NO

## 2024-12-03 ASSESSMENT — PAIN DESCRIPTION - LOCATION
LOCATION: ARM;HAND;HIP
LOCATION: ABDOMEN;ARM;HAND
LOCATION: BACK;HIP;LEG;ARM
LOCATION: LEG;HIP

## 2024-12-03 ASSESSMENT — PAIN SCALES - GENERAL
PAINLEVEL_OUTOF10: 6
PAINLEVEL_OUTOF10: 5
PAINLEVEL_OUTOF10: 7
PAINLEVEL_OUTOF10: 5
PAINLEVEL_OUTOF10: 6
PAINLEVEL_OUTOF10: 4
PAINLEVEL_OUTOF10: 7
PAINLEVEL_OUTOF10: 7
PAINLEVEL_OUTOF10: 6

## 2024-12-03 ASSESSMENT — PAIN DESCRIPTION - DESCRIPTORS
DESCRIPTORS: DULL;DISCOMFORT;ACHING
DESCRIPTORS: DISCOMFORT;DULL;ACHING
DESCRIPTORS: ACHING;THROBBING
DESCRIPTORS: ACHING;DULL;SORE
DESCRIPTORS: ACHING;DISCOMFORT;DULL
DESCRIPTORS: ACHING;THROBBING

## 2024-12-03 ASSESSMENT — PAIN DESCRIPTION - ORIENTATION
ORIENTATION: LEFT

## 2024-12-03 ASSESSMENT — PATIENT HEALTH QUESTIONNAIRE - PHQ9
2. FEELING DOWN, DEPRESSED OR HOPELESS: NOT AT ALL
SUM OF ALL RESPONSES TO PHQ QUESTIONS 1-9: 0
1. LITTLE INTEREST OR PLEASURE IN DOING THINGS: NOT AT ALL
SUM OF ALL RESPONSES TO PHQ QUESTIONS 1-9: 0
SUM OF ALL RESPONSES TO PHQ QUESTIONS 1-9: 0
SUM OF ALL RESPONSES TO PHQ9 QUESTIONS 1 & 2: 0
SUM OF ALL RESPONSES TO PHQ QUESTIONS 1-9: 0

## 2024-12-03 ASSESSMENT — PAIN - FUNCTIONAL ASSESSMENT
PAIN_FUNCTIONAL_ASSESSMENT: PREVENTS OR INTERFERES WITH MANY ACTIVE NOT PASSIVE ACTIVITIES
PAIN_FUNCTIONAL_ASSESSMENT: PREVENTS OR INTERFERES SOME ACTIVE ACTIVITIES AND ADLS
PAIN_FUNCTIONAL_ASSESSMENT: PREVENTS OR INTERFERES WITH ALL ACTIVE AND SOME PASSIVE ACTIVITIES

## 2024-12-03 ASSESSMENT — LIFESTYLE VARIABLES
HOW MANY STANDARD DRINKS CONTAINING ALCOHOL DO YOU HAVE ON A TYPICAL DAY: 1 OR 2
HOW OFTEN DO YOU HAVE A DRINK CONTAINING ALCOHOL: MONTHLY OR LESS

## 2024-12-03 NOTE — ACP (ADVANCE CARE PLANNING)
Advance Care Planning   Healthcare Decision Maker:    Primary Decision Maker: Kellie Ramirez - Spouse - [phone]    Secondary Decision Maker: Issac Ramirez II - Child - [phone]    Click here to complete Healthcare Decision Makers including selection of the Healthcare Decision Maker Relationship (ie \"Primary\").  Today we documented Decision Maker(s) consistent with Legal Next of Kin hierarchy.       If the relationship to the patient does NOT follow our state's Next of Kin hierarchy, the patient MUST complete an ACP Document to allow him/her to act on the patient's behalf. :

## 2024-12-03 NOTE — CARE COORDINATION
CM update note.  S/P open reduction internal fixation of left acetabular fracture on 12/2/2024, xenograft application to left volar wrist wound on 12/2/2024, lateral collateral ligament repair of left elbow on 12/2/2024, open reduction total fixation of left distal radius and ulnar fracture on 11/27/2024.   Wound Vax in place.  Renato with [facility] is following.  TDWB LLE, NWB MACIEE.  Pt eval pending.  Ot Am-pac is 13.  Met with patient to discuss discharge planning.  He wants to include his wife in the HORTENSIA decision.  Will meet with them together once she arrives to room.        1400:  Met with patient and his wife.  Discussed H[facility]IA, both are in agreement.  List given to patient wife.  She will review and give me the top three choices.  CM/SW to follow.  Jan Damico RN [phone].

## 2024-12-03 NOTE — ANESTHESIA POSTPROCEDURE EVALUATION
Department of Anesthesiology  Postprocedure Note    Patient: Issac Ramirez  MRN: [id number]  YOB: 1965  Date of evaluation: 12/3/2024    Procedure Summary       Date: 12/02/24 Room / Location: [address]    Anesthesia Start: 1150 Anesthesia Stop: 1516    Procedures:       LEFT ACETABULUM OPEN REDUCTION INTERNAL FIXATION (Left: Hip)      Irrigation and debridement left forearm with skin grafting (Left: Arm Lower) Diagnosis:       Open nondisplaced fracture of left acetabulum, unspecified portion of acetabulum, initial encounter ([facility])      (Open nondisplaced fracture of left acetabulum, unspecified portion of acetabulum, initial encounter ([facility]) [S32.402B])    Surgeons: Ry Castro DO Responsible Provider: Kiki Morris MD    Anesthesia Type: general ASA Status: 2            Anesthesia Type: No value filed.    Sergio Phase I: Sergio Score: 8    Sergio Phase II:      Anesthesia Post Evaluation    Patient location during evaluation: PACU  Patient participation: complete - patient participated  Level of consciousness: awake and alert  Airway patency: patent  Nausea & Vomiting: no nausea and no vomiting  Cardiovascular status: blood pressure returned to baseline and hemodynamically stable  Respiratory status: acceptable and spontaneous ventilation  Hydration status: euvolemic  Multimodal analgesia pain management approach  Pain management: adequate    No notable events documented.

## 2024-12-04 LAB
ANION GAP SERPL CALCULATED.3IONS-SCNC: 10 MMOL/L (ref 7–16)
BASOPHILS # BLD: 0.03 K/UL (ref 0–0.2)
BASOPHILS NFR BLD: 0 % (ref 0–2)
BUN SERPL-MCNC: 20 MG/DL (ref 6–20)
CALCIUM SERPL-MCNC: 8.9 MG/DL (ref 8.6–10.2)
CHLORIDE SERPL-SCNC: 103 MMOL/L (ref 98–107)
CO2 SERPL-SCNC: 26 MMOL/L (ref 22–29)
CREAT SERPL-MCNC: 0.9 MG/DL (ref 0.7–1.2)
EOSINOPHIL # BLD: 0.12 K/UL (ref 0.05–0.5)
EOSINOPHILS RELATIVE PERCENT: 1 % (ref 0–6)
ERYTHROCYTE [DISTWIDTH] IN BLOOD BY AUTOMATED COUNT: 12.6 % (ref 11.5–15)
GFR, ESTIMATED: >90 ML/MIN/1.73M2
GLUCOSE SERPL-MCNC: 107 MG/DL (ref 74–99)
HCT VFR BLD AUTO: 27.6 % (ref 37–54)
HGB BLD-MCNC: 9.5 G/DL (ref 12.5–16.5)
IMM GRANULOCYTES # BLD AUTO: 0.27 K/UL (ref 0–0.58)
IMM GRANULOCYTES NFR BLD: 3 % (ref 0–5)
LYMPHOCYTES NFR BLD: 1.27 K/UL (ref 1.5–4)
LYMPHOCYTES RELATIVE PERCENT: 15 % (ref 20–42)
MCH RBC QN AUTO: 30.2 PG (ref 26–35)
MCHC RBC AUTO-ENTMCNC: 34.4 G/DL (ref 32–34.5)
MCV RBC AUTO: 87.6 FL (ref 80–99.9)
MONOCYTES NFR BLD: 0.89 K/UL (ref 0.1–0.95)
MONOCYTES NFR BLD: 10 % (ref 2–12)
NEUTROPHILS NFR BLD: 71 % (ref 43–80)
NEUTS SEG NFR BLD: 6.15 K/UL (ref 1.8–7.3)
PLATELET # BLD AUTO: 252 K/UL (ref 130–450)
PMV BLD AUTO: 11.1 FL (ref 7–12)
POTASSIUM SERPL-SCNC: 4.3 MMOL/L (ref 3.5–5)
RBC # BLD AUTO: 3.15 M/UL (ref 3.8–5.8)
SODIUM SERPL-SCNC: 139 MMOL/L (ref 132–146)
WBC OTHER # BLD: 8.7 K/UL (ref 4.5–11.5)

## 2024-12-04 PROCEDURE — 97535 SELF CARE MNGMENT TRAINING: CPT

## 2024-12-04 PROCEDURE — 6370000000 HC RX 637 (ALT 250 FOR IP): Performed by: STUDENT IN AN ORGANIZED HEALTH CARE EDUCATION/TRAINING PROGRAM

## 2024-12-04 PROCEDURE — 6370000000 HC RX 637 (ALT 250 FOR IP)

## 2024-12-04 PROCEDURE — 99232 SBSQ HOSP IP/OBS MODERATE 35: CPT | Performed by: STUDENT IN AN ORGANIZED HEALTH CARE EDUCATION/TRAINING PROGRAM

## 2024-12-04 PROCEDURE — 80048 BASIC METABOLIC PNL TOTAL CA: CPT

## 2024-12-04 PROCEDURE — 6360000002 HC RX W HCPCS: Performed by: STUDENT IN AN ORGANIZED HEALTH CARE EDUCATION/TRAINING PROGRAM

## 2024-12-04 PROCEDURE — 85025 COMPLETE CBC W/AUTO DIFF WBC: CPT

## 2024-12-04 PROCEDURE — 1200000000 HC SEMI PRIVATE

## 2024-12-04 PROCEDURE — 36415 COLL VENOUS BLD VENIPUNCTURE: CPT

## 2024-12-04 PROCEDURE — 51702 INSERT TEMP BLADDER CATH: CPT

## 2024-12-04 PROCEDURE — 2580000003 HC RX 258

## 2024-12-04 PROCEDURE — 97530 THERAPEUTIC ACTIVITIES: CPT

## 2024-12-04 RX ADMIN — ACETAMINOPHEN 650 MG: 325 TABLET ORAL at 03:21

## 2024-12-04 RX ADMIN — SENNOSIDES 8.6 MG: 8.6 TABLET, FILM COATED ORAL at 09:17

## 2024-12-04 RX ADMIN — SODIUM CHLORIDE, PRESERVATIVE FREE 10 ML: 5 INJECTION INTRAVENOUS at 09:18

## 2024-12-04 RX ADMIN — ACETAMINOPHEN 650 MG: 325 TABLET ORAL at 14:43

## 2024-12-04 RX ADMIN — METHOCARBAMOL TABLETS 1000 MG: 500 TABLET, COATED ORAL at 14:43

## 2024-12-04 RX ADMIN — SENNOSIDES 8.6 MG: 8.6 TABLET, FILM COATED ORAL at 21:53

## 2024-12-04 RX ADMIN — ACETAMINOPHEN 650 MG: 325 TABLET ORAL at 09:15

## 2024-12-04 RX ADMIN — METHOCARBAMOL TABLETS 1000 MG: 500 TABLET, COATED ORAL at 17:26

## 2024-12-04 RX ADMIN — ENOXAPARIN SODIUM 30 MG: 100 INJECTION SUBCUTANEOUS at 09:18

## 2024-12-04 RX ADMIN — METHOCARBAMOL TABLETS 1000 MG: 500 TABLET, COATED ORAL at 21:53

## 2024-12-04 RX ADMIN — METHOCARBAMOL TABLETS 1000 MG: 500 TABLET, COATED ORAL at 09:17

## 2024-12-04 RX ADMIN — ENOXAPARIN SODIUM 30 MG: 100 INJECTION SUBCUTANEOUS at 21:53

## 2024-12-04 RX ADMIN — ACETAMINOPHEN 650 MG: 325 TABLET ORAL at 21:53

## 2024-12-04 RX ADMIN — POLYETHYLENE GLYCOL 3350 17 G: 17 POWDER, FOR SOLUTION ORAL at 09:18

## 2024-12-04 RX ADMIN — BISACODYL 10 MG: 10 SUPPOSITORY RECTAL at 09:17

## 2024-12-04 RX ADMIN — Medication 5 MG: at 21:53

## 2024-12-04 RX ADMIN — SODIUM CHLORIDE, PRESERVATIVE FREE 10 ML: 5 INJECTION INTRAVENOUS at 21:53

## 2024-12-04 ASSESSMENT — PAIN DESCRIPTION - ORIENTATION
ORIENTATION: LEFT

## 2024-12-04 ASSESSMENT — PAIN SCALES - GENERAL
PAINLEVEL_OUTOF10: 0
PAINLEVEL_OUTOF10: 5
PAINLEVEL_OUTOF10: 5
PAINLEVEL_OUTOF10: 4
PAINLEVEL_OUTOF10: 2
PAINLEVEL_OUTOF10: 1

## 2024-12-04 ASSESSMENT — PAIN DESCRIPTION - ONSET
ONSET: ON-GOING
ONSET: ON-GOING

## 2024-12-04 ASSESSMENT — PAIN DESCRIPTION - LOCATION
LOCATION: ARM;HIP
LOCATION: ARM
LOCATION: ARM;LEG

## 2024-12-04 ASSESSMENT — PAIN DESCRIPTION - FREQUENCY
FREQUENCY: CONTINUOUS
FREQUENCY: CONTINUOUS

## 2024-12-04 ASSESSMENT — PAIN DESCRIPTION - PAIN TYPE
TYPE: ACUTE PAIN
TYPE: ACUTE PAIN

## 2024-12-04 ASSESSMENT — PAIN DESCRIPTION - DESCRIPTORS
DESCRIPTORS: ACHING;DISCOMFORT;SORE
DESCRIPTORS: ACHING;DISCOMFORT;SORE

## 2024-12-04 NOTE — CARE COORDINATION
CM update note.  Met with patient.  He is sitting up in the chair today.  He reports his wife is touring nursing facilities today.  Await choices from her.  Patient is S/P open reduction internal fixation of left acetabular fracture on 12/2/2024, xenograft application to left volar wrist wound on 12/2/2024, lateral collateral ligament repair of left elbow on 12/2/2024, open reduction total fixation of left distal radius and ulnar fracture on 11/27/2024.   Wound Vac in place.  Renato WALDRON is following the wound vac.  SYLWIA PINK, NWCOURTNEY TERRELL.  Per Ortho notes, We will also have to address his wound VAC to the left upper extremity prior to discharge, this may involve a change to dressing changes versus a Prevena wound VAC.  CM/SW to follow.  Jan Damico RN [phone].

## 2024-12-05 LAB
ANION GAP SERPL CALCULATED.3IONS-SCNC: 9 MMOL/L (ref 7–16)
BASOPHILS # BLD: 0.04 K/UL (ref 0–0.2)
BASOPHILS NFR BLD: 1 % (ref 0–2)
BUN SERPL-MCNC: 19 MG/DL (ref 6–20)
CALCIUM SERPL-MCNC: 9.2 MG/DL (ref 8.6–10.2)
CHLORIDE SERPL-SCNC: 103 MMOL/L (ref 98–107)
CO2 SERPL-SCNC: 27 MMOL/L (ref 22–29)
CREAT SERPL-MCNC: 0.8 MG/DL (ref 0.7–1.2)
EOSINOPHIL # BLD: 0.18 K/UL (ref 0.05–0.5)
EOSINOPHILS RELATIVE PERCENT: 2 % (ref 0–6)
ERYTHROCYTE [DISTWIDTH] IN BLOOD BY AUTOMATED COUNT: 12.6 % (ref 11.5–15)
GFR, ESTIMATED: >90 ML/MIN/1.73M2
GLUCOSE SERPL-MCNC: 110 MG/DL (ref 74–99)
HCT VFR BLD AUTO: 28 % (ref 37–54)
HGB BLD-MCNC: 9.2 G/DL (ref 12.5–16.5)
IMM GRANULOCYTES # BLD AUTO: 0.39 K/UL (ref 0–0.58)
IMM GRANULOCYTES NFR BLD: 5 % (ref 0–5)
LYMPHOCYTES NFR BLD: 1.32 K/UL (ref 1.5–4)
LYMPHOCYTES RELATIVE PERCENT: 16 % (ref 20–42)
MCH RBC QN AUTO: 28.9 PG (ref 26–35)
MCHC RBC AUTO-ENTMCNC: 32.9 G/DL (ref 32–34.5)
MCV RBC AUTO: 88.1 FL (ref 80–99.9)
MONOCYTES NFR BLD: 0.67 K/UL (ref 0.1–0.95)
MONOCYTES NFR BLD: 8 % (ref 2–12)
NEUTROPHILS NFR BLD: 68 % (ref 43–80)
NEUTS SEG NFR BLD: 5.55 K/UL (ref 1.8–7.3)
PLATELET # BLD AUTO: 278 K/UL (ref 130–450)
PMV BLD AUTO: 11 FL (ref 7–12)
POTASSIUM SERPL-SCNC: 4.1 MMOL/L (ref 3.5–5)
RBC # BLD AUTO: 3.18 M/UL (ref 3.8–5.8)
SODIUM SERPL-SCNC: 139 MMOL/L (ref 132–146)
WBC OTHER # BLD: 8.2 K/UL (ref 4.5–11.5)

## 2024-12-05 PROCEDURE — 6370000000 HC RX 637 (ALT 250 FOR IP)

## 2024-12-05 PROCEDURE — 80048 BASIC METABOLIC PNL TOTAL CA: CPT

## 2024-12-05 PROCEDURE — 6360000002 HC RX W HCPCS: Performed by: STUDENT IN AN ORGANIZED HEALTH CARE EDUCATION/TRAINING PROGRAM

## 2024-12-05 PROCEDURE — 6370000000 HC RX 637 (ALT 250 FOR IP): Performed by: STUDENT IN AN ORGANIZED HEALTH CARE EDUCATION/TRAINING PROGRAM

## 2024-12-05 PROCEDURE — 1200000000 HC SEMI PRIVATE

## 2024-12-05 PROCEDURE — 85025 COMPLETE CBC W/AUTO DIFF WBC: CPT

## 2024-12-05 PROCEDURE — 97535 SELF CARE MNGMENT TRAINING: CPT

## 2024-12-05 PROCEDURE — 97530 THERAPEUTIC ACTIVITIES: CPT

## 2024-12-05 PROCEDURE — 2580000003 HC RX 258

## 2024-12-05 PROCEDURE — 36415 COLL VENOUS BLD VENIPUNCTURE: CPT

## 2024-12-05 RX ORDER — POLYETHYLENE GLYCOL 3350 17 G/17G
34 POWDER, FOR SOLUTION ORAL DAILY
Status: DISCONTINUED | OUTPATIENT
Start: 2024-12-05 | End: 2024-12-11 | Stop reason: HOSPADM

## 2024-12-05 RX ORDER — LACTULOSE 10 G/15ML
30 SOLUTION ORAL 3 TIMES DAILY
Status: DISCONTINUED | OUTPATIENT
Start: 2024-12-05 | End: 2024-12-08

## 2024-12-05 RX ADMIN — METHOCARBAMOL TABLETS 1000 MG: 500 TABLET, COATED ORAL at 15:07

## 2024-12-05 RX ADMIN — LACTULOSE 30 G: 20 SOLUTION ORAL at 15:07

## 2024-12-05 RX ADMIN — ACETAMINOPHEN 650 MG: 325 TABLET ORAL at 15:07

## 2024-12-05 RX ADMIN — BISACODYL 10 MG: 10 SUPPOSITORY RECTAL at 08:35

## 2024-12-05 RX ADMIN — ACETAMINOPHEN 650 MG: 325 TABLET ORAL at 08:35

## 2024-12-05 RX ADMIN — ENOXAPARIN SODIUM 30 MG: 100 INJECTION SUBCUTANEOUS at 08:35

## 2024-12-05 RX ADMIN — SODIUM CHLORIDE, PRESERVATIVE FREE 10 ML: 5 INJECTION INTRAVENOUS at 08:47

## 2024-12-05 RX ADMIN — METHOCARBAMOL TABLETS 1000 MG: 500 TABLET, COATED ORAL at 21:19

## 2024-12-05 RX ADMIN — METHOCARBAMOL TABLETS 1000 MG: 500 TABLET, COATED ORAL at 08:35

## 2024-12-05 RX ADMIN — SENNOSIDES 8.6 MG: 8.6 TABLET, FILM COATED ORAL at 21:19

## 2024-12-05 RX ADMIN — ACETAMINOPHEN 650 MG: 325 TABLET ORAL at 21:19

## 2024-12-05 RX ADMIN — ENOXAPARIN SODIUM 30 MG: 100 INJECTION SUBCUTANEOUS at 21:22

## 2024-12-05 RX ADMIN — ACETAMINOPHEN 650 MG: 325 TABLET ORAL at 02:25

## 2024-12-05 RX ADMIN — SENNOSIDES 8.6 MG: 8.6 TABLET, FILM COATED ORAL at 08:35

## 2024-12-05 RX ADMIN — Medication 5 MG: at 21:19

## 2024-12-05 RX ADMIN — POLYETHYLENE GLYCOL 3350 34 G: 17 POWDER, FOR SOLUTION ORAL at 08:35

## 2024-12-05 RX ADMIN — SODIUM CHLORIDE, PRESERVATIVE FREE 10 ML: 5 INJECTION INTRAVENOUS at 21:22

## 2024-12-05 RX ADMIN — LACTULOSE 30 G: 20 SOLUTION ORAL at 08:36

## 2024-12-05 ASSESSMENT — PAIN DESCRIPTION - ORIENTATION: ORIENTATION: LEFT

## 2024-12-05 ASSESSMENT — PAIN SCALES - GENERAL
PAINLEVEL_OUTOF10: 5
PAINLEVEL_OUTOF10: 3
PAINLEVEL_OUTOF10: 2

## 2024-12-05 ASSESSMENT — PAIN DESCRIPTION - DESCRIPTORS: DESCRIPTORS: ACHING;SHARP;SORE

## 2024-12-05 ASSESSMENT — PAIN DESCRIPTION - LOCATION: LOCATION: ARM;LEG

## 2024-12-05 NOTE — CARE COORDINATION
CM update note.  Met with patient.  He reports his wife will give SNF choices today.  Patient is S/P open reduction internal fixation of left acetabular fracture on 12/2/2024, xenograft application to left volar wrist wound on 12/2/2024, lateral collateral ligament repair of left elbow on 12/2/2024, open reduction total fixation of left distal radius and ulnar fracture on 11/27/2024.   Wound Vac in place.  Renato with [facility] is following the wound vac.  SYLWIA PINK, NWCOURTNEY TERRELL.  Per Ortho notes, We will also have to address his wound VAC to the left upper extremity prior to discharge, this may involve a change to dressing changes versus a Prevena wound VAC.  Ulrich catheter removed.  CM/SW to follow.  Jan Damico RN [phone].      1430:  spoke with patient wife via phone.  She has toured [facility] and [facility].  Prefers [facility] first.  She will tour [facility] tomorrow.  Referral called to Kalyn with [facility].  She will review for both facilities.  CM/SW to follow.  Jan Damico RN [phone].

## 2024-12-06 LAB
ANION GAP SERPL CALCULATED.3IONS-SCNC: 10 MMOL/L (ref 7–16)
BASOPHILS # BLD: 0.15 K/UL (ref 0–0.2)
BASOPHILS NFR BLD: 2 % (ref 0–2)
BUN SERPL-MCNC: 18 MG/DL (ref 6–20)
CALCIUM SERPL-MCNC: 9.6 MG/DL (ref 8.6–10.2)
CHLORIDE SERPL-SCNC: 104 MMOL/L (ref 98–107)
CO2 SERPL-SCNC: 26 MMOL/L (ref 22–29)
CREAT SERPL-MCNC: 0.8 MG/DL (ref 0.7–1.2)
EOSINOPHIL # BLD: 0.07 K/UL (ref 0.05–0.5)
EOSINOPHILS RELATIVE PERCENT: 1 % (ref 0–6)
ERYTHROCYTE [DISTWIDTH] IN BLOOD BY AUTOMATED COUNT: 12.7 % (ref 11.5–15)
GFR, ESTIMATED: >90 ML/MIN/1.73M2
GLUCOSE SERPL-MCNC: 109 MG/DL (ref 74–99)
HCT VFR BLD AUTO: 29.7 % (ref 37–54)
HGB BLD-MCNC: 9.7 G/DL (ref 12.5–16.5)
LYMPHOCYTES NFR BLD: 2.09 K/UL (ref 1.5–4)
LYMPHOCYTES RELATIVE PERCENT: 24 % (ref 20–42)
MCH RBC QN AUTO: 29 PG (ref 26–35)
MCHC RBC AUTO-ENTMCNC: 32.7 G/DL (ref 32–34.5)
MCV RBC AUTO: 88.9 FL (ref 80–99.9)
METAMYELOCYTES ABSOLUTE COUNT: 0.07 K/UL (ref 0–0.12)
METAMYELOCYTES: 1 % (ref 0–1)
MONOCYTES NFR BLD: 0.45 K/UL (ref 0.1–0.95)
MONOCYTES NFR BLD: 5 % (ref 2–12)
MYELOCYTES ABSOLUTE COUNT: 0.07 K/UL
MYELOCYTES: 1 %
NEUTROPHILS NFR BLD: 65 % (ref 43–80)
NEUTS SEG NFR BLD: 5.61 K/UL (ref 1.8–7.3)
PLATELET # BLD AUTO: 352 K/UL (ref 130–450)
PMV BLD AUTO: 11 FL (ref 7–12)
POTASSIUM SERPL-SCNC: 4.1 MMOL/L (ref 3.5–5)
PROMYELOCYTES ABSOLUTE COUNT: 0.07 K/UL
PROMYELOCYTES: 1 %
RBC # BLD AUTO: 3.34 M/UL (ref 3.8–5.8)
RBC # BLD: ABNORMAL 10*6/UL
SODIUM SERPL-SCNC: 140 MMOL/L (ref 132–146)
WBC OTHER # BLD: 8.6 K/UL (ref 4.5–11.5)

## 2024-12-06 PROCEDURE — 97530 THERAPEUTIC ACTIVITIES: CPT

## 2024-12-06 PROCEDURE — 36415 COLL VENOUS BLD VENIPUNCTURE: CPT

## 2024-12-06 PROCEDURE — 6370000000 HC RX 637 (ALT 250 FOR IP)

## 2024-12-06 PROCEDURE — 6370000000 HC RX 637 (ALT 250 FOR IP): Performed by: STUDENT IN AN ORGANIZED HEALTH CARE EDUCATION/TRAINING PROGRAM

## 2024-12-06 PROCEDURE — 6360000002 HC RX W HCPCS: Performed by: STUDENT IN AN ORGANIZED HEALTH CARE EDUCATION/TRAINING PROGRAM

## 2024-12-06 PROCEDURE — 99232 SBSQ HOSP IP/OBS MODERATE 35: CPT | Performed by: STUDENT IN AN ORGANIZED HEALTH CARE EDUCATION/TRAINING PROGRAM

## 2024-12-06 PROCEDURE — 97535 SELF CARE MNGMENT TRAINING: CPT

## 2024-12-06 PROCEDURE — 80048 BASIC METABOLIC PNL TOTAL CA: CPT

## 2024-12-06 PROCEDURE — 1200000000 HC SEMI PRIVATE

## 2024-12-06 PROCEDURE — 85025 COMPLETE CBC W/AUTO DIFF WBC: CPT

## 2024-12-06 PROCEDURE — 2580000003 HC RX 258

## 2024-12-06 RX ADMIN — METHOCARBAMOL TABLETS 1000 MG: 500 TABLET, COATED ORAL at 17:13

## 2024-12-06 RX ADMIN — ACETAMINOPHEN 650 MG: 325 TABLET ORAL at 21:35

## 2024-12-06 RX ADMIN — POLYETHYLENE GLYCOL 3350 34 G: 17 POWDER, FOR SOLUTION ORAL at 09:32

## 2024-12-06 RX ADMIN — ENOXAPARIN SODIUM 30 MG: 100 INJECTION SUBCUTANEOUS at 21:35

## 2024-12-06 RX ADMIN — Medication: at 16:44

## 2024-12-06 RX ADMIN — LACTULOSE 30 G: 20 SOLUTION ORAL at 09:31

## 2024-12-06 RX ADMIN — ACETAMINOPHEN 650 MG: 325 TABLET ORAL at 09:31

## 2024-12-06 RX ADMIN — BISACODYL 10 MG: 10 SUPPOSITORY RECTAL at 09:32

## 2024-12-06 RX ADMIN — SENNOSIDES 8.6 MG: 8.6 TABLET, FILM COATED ORAL at 09:31

## 2024-12-06 RX ADMIN — LACTULOSE 30 G: 20 SOLUTION ORAL at 21:35

## 2024-12-06 RX ADMIN — Medication 5 MG: at 21:35

## 2024-12-06 RX ADMIN — METHOCARBAMOL TABLETS 1000 MG: 500 TABLET, COATED ORAL at 21:35

## 2024-12-06 RX ADMIN — METHOCARBAMOL TABLETS 1000 MG: 500 TABLET, COATED ORAL at 09:31

## 2024-12-06 RX ADMIN — SODIUM CHLORIDE, PRESERVATIVE FREE 10 ML: 5 INJECTION INTRAVENOUS at 21:36

## 2024-12-06 RX ADMIN — METHOCARBAMOL TABLETS 1000 MG: 500 TABLET, COATED ORAL at 14:00

## 2024-12-06 RX ADMIN — LACTULOSE 30 G: 20 SOLUTION ORAL at 14:00

## 2024-12-06 RX ADMIN — SODIUM CHLORIDE, PRESERVATIVE FREE 10 ML: 5 INJECTION INTRAVENOUS at 09:35

## 2024-12-06 RX ADMIN — SENNOSIDES 8.6 MG: 8.6 TABLET, FILM COATED ORAL at 21:35

## 2024-12-06 RX ADMIN — ACETAMINOPHEN 650 MG: 325 TABLET ORAL at 14:00

## 2024-12-06 RX ADMIN — ENOXAPARIN SODIUM 30 MG: 100 INJECTION SUBCUTANEOUS at 09:31

## 2024-12-06 NOTE — CARE COORDINATION
CM update note.  [facility] and [facility] declined referral.  Met with patient and updated him.  Spoke with patient wife via phone and updated her.  She requested referral to [facility].  Referral called to [facility] with [facility].  Per [facility], no beds available until Wednesday next week.  Patient wife requested referral to [facility], referral called to [facility].  She is touring [facility] now and will let me know if this is an option.      1300:  [facility] can accept pending patient paying upfront 20% copay and insurance precert.  Explained this to the patient.  He is waiting for his wife to finish touring this facility    1515:  Spoke with patient wife via phone.  She has reached out to the owner of [facility] to try and get him admitted there.  She wants to wait on their decision before agreeing to other facilities.  Await to hear from [facility].  RYAN/MICHAEL to follow.  Jan Damico RN, [phone].

## 2024-12-07 LAB
ANION GAP SERPL CALCULATED.3IONS-SCNC: 10 MMOL/L (ref 7–16)
BASOPHILS # BLD: 0.08 K/UL (ref 0–0.2)
BASOPHILS NFR BLD: 1 % (ref 0–2)
BUN SERPL-MCNC: 20 MG/DL (ref 6–20)
CALCIUM SERPL-MCNC: 8.9 MG/DL (ref 8.6–10.2)
CHLORIDE SERPL-SCNC: 101 MMOL/L (ref 98–107)
CO2 SERPL-SCNC: 24 MMOL/L (ref 22–29)
CREAT SERPL-MCNC: 0.8 MG/DL (ref 0.7–1.2)
EOSINOPHIL # BLD: 0.08 K/UL (ref 0.05–0.5)
EOSINOPHILS RELATIVE PERCENT: 1 % (ref 0–6)
ERYTHROCYTE [DISTWIDTH] IN BLOOD BY AUTOMATED COUNT: 12.6 % (ref 11.5–15)
GFR, ESTIMATED: >90 ML/MIN/1.73M2
GLUCOSE SERPL-MCNC: 102 MG/DL (ref 74–99)
HCT VFR BLD AUTO: 28.8 % (ref 37–54)
HGB BLD-MCNC: 9.5 G/DL (ref 12.5–16.5)
LYMPHOCYTES NFR BLD: 1.32 K/UL (ref 1.5–4)
LYMPHOCYTES RELATIVE PERCENT: 15 % (ref 20–42)
MCH RBC QN AUTO: 29.2 PG (ref 26–35)
MCHC RBC AUTO-ENTMCNC: 33 G/DL (ref 32–34.5)
MCV RBC AUTO: 88.6 FL (ref 80–99.9)
METAMYELOCYTES ABSOLUTE COUNT: 0.15 K/UL (ref 0–0.12)
METAMYELOCYTES: 2 % (ref 0–1)
MONOCYTES NFR BLD: 0.46 K/UL (ref 0.1–0.95)
MONOCYTES NFR BLD: 5 % (ref 2–12)
NEUTROPHILS NFR BLD: 77 % (ref 43–80)
NEUTS SEG NFR BLD: 6.81 K/UL (ref 1.8–7.3)
PLATELET # BLD AUTO: 344 K/UL (ref 130–450)
PMV BLD AUTO: 10.7 FL (ref 7–12)
POTASSIUM SERPL-SCNC: 4 MMOL/L (ref 3.5–5)
RBC # BLD AUTO: 3.25 M/UL (ref 3.8–5.8)
RBC # BLD: ABNORMAL 10*6/UL
RBC # BLD: ABNORMAL 10*6/UL
SODIUM SERPL-SCNC: 135 MMOL/L (ref 132–146)
WBC OTHER # BLD: 8.9 K/UL (ref 4.5–11.5)

## 2024-12-07 PROCEDURE — 36415 COLL VENOUS BLD VENIPUNCTURE: CPT

## 2024-12-07 PROCEDURE — 2580000003 HC RX 258

## 2024-12-07 PROCEDURE — 80048 BASIC METABOLIC PNL TOTAL CA: CPT

## 2024-12-07 PROCEDURE — 1200000000 HC SEMI PRIVATE

## 2024-12-07 PROCEDURE — 99232 SBSQ HOSP IP/OBS MODERATE 35: CPT | Performed by: STUDENT IN AN ORGANIZED HEALTH CARE EDUCATION/TRAINING PROGRAM

## 2024-12-07 PROCEDURE — 6370000000 HC RX 637 (ALT 250 FOR IP)

## 2024-12-07 PROCEDURE — 85025 COMPLETE CBC W/AUTO DIFF WBC: CPT

## 2024-12-07 PROCEDURE — 6360000002 HC RX W HCPCS: Performed by: STUDENT IN AN ORGANIZED HEALTH CARE EDUCATION/TRAINING PROGRAM

## 2024-12-07 RX ADMIN — ENOXAPARIN SODIUM 30 MG: 100 INJECTION SUBCUTANEOUS at 09:24

## 2024-12-07 RX ADMIN — ACETAMINOPHEN 650 MG: 325 TABLET ORAL at 18:41

## 2024-12-07 RX ADMIN — ENOXAPARIN SODIUM 30 MG: 100 INJECTION SUBCUTANEOUS at 21:20

## 2024-12-07 RX ADMIN — Medication 5 MG: at 21:21

## 2024-12-07 RX ADMIN — METHOCARBAMOL TABLETS 1000 MG: 500 TABLET, COATED ORAL at 18:41

## 2024-12-07 RX ADMIN — ACETAMINOPHEN 650 MG: 325 TABLET ORAL at 07:36

## 2024-12-07 RX ADMIN — METHOCARBAMOL TABLETS 1000 MG: 500 TABLET, COATED ORAL at 09:24

## 2024-12-07 RX ADMIN — SENNOSIDES 8.6 MG: 8.6 TABLET, FILM COATED ORAL at 21:20

## 2024-12-07 RX ADMIN — ACETAMINOPHEN 650 MG: 325 TABLET ORAL at 12:56

## 2024-12-07 RX ADMIN — SODIUM CHLORIDE, PRESERVATIVE FREE 10 ML: 5 INJECTION INTRAVENOUS at 09:25

## 2024-12-07 RX ADMIN — SODIUM CHLORIDE, PRESERVATIVE FREE 10 ML: 5 INJECTION INTRAVENOUS at 21:21

## 2024-12-07 RX ADMIN — METHOCARBAMOL TABLETS 1000 MG: 500 TABLET, COATED ORAL at 12:56

## 2024-12-07 RX ADMIN — METHOCARBAMOL TABLETS 1000 MG: 500 TABLET, COATED ORAL at 21:20

## 2024-12-07 ASSESSMENT — PAIN DESCRIPTION - ORIENTATION
ORIENTATION: LEFT
ORIENTATION: LEFT

## 2024-12-07 ASSESSMENT — PAIN DESCRIPTION - ONSET: ONSET: GRADUAL

## 2024-12-07 ASSESSMENT — PAIN SCALES - GENERAL
PAINLEVEL_OUTOF10: 7
PAINLEVEL_OUTOF10: 4
PAINLEVEL_OUTOF10: 7

## 2024-12-07 ASSESSMENT — PAIN DESCRIPTION - DESCRIPTORS
DESCRIPTORS: ACHING;DISCOMFORT;NAGGING;SORE
DESCRIPTORS: ACHING;DISCOMFORT;NAGGING;SORE

## 2024-12-07 ASSESSMENT — PAIN DESCRIPTION - FREQUENCY
FREQUENCY: INTERMITTENT
FREQUENCY: INTERMITTENT

## 2024-12-07 ASSESSMENT — PAIN DESCRIPTION - PAIN TYPE
TYPE: SURGICAL PAIN
TYPE: SURGICAL PAIN

## 2024-12-07 ASSESSMENT — PAIN DESCRIPTION - LOCATION
LOCATION: ARM
LOCATION: ARM

## 2024-12-08 PROBLEM — S52.602B OPEN FRACTURE OF LEFT DISTAL RADIUS AND ULNA: Status: ACTIVE | Noted: 2024-12-08

## 2024-12-08 PROBLEM — S52.609B: Status: RESOLVED | Noted: 2024-11-26 | Resolved: 2024-12-08

## 2024-12-08 PROBLEM — S53.105A DISLOCATION OF LEFT ELBOW: Status: ACTIVE | Noted: 2024-12-08

## 2024-12-08 PROBLEM — S52.502B OPEN FRACTURE OF DISTAL END OF LEFT RADIUS: Status: ACTIVE | Noted: 2024-12-08

## 2024-12-08 LAB
ANION GAP SERPL CALCULATED.3IONS-SCNC: 13 MMOL/L (ref 7–16)
ATYPICAL LYMPHOCYTE ABSOLUTE COUNT: 0.15 K/UL (ref 0–0.46)
ATYPICAL LYMPHOCYTES: 2 % (ref 0–4)
BASOPHILS # BLD: 0.15 K/UL (ref 0–0.2)
BASOPHILS NFR BLD: 2 % (ref 0–2)
BUN SERPL-MCNC: 24 MG/DL (ref 6–20)
CALCIUM SERPL-MCNC: 8.8 MG/DL (ref 8.6–10.2)
CHLORIDE SERPL-SCNC: 102 MMOL/L (ref 98–107)
CO2 SERPL-SCNC: 22 MMOL/L (ref 22–29)
CREAT SERPL-MCNC: 0.9 MG/DL (ref 0.7–1.2)
EOSINOPHIL # BLD: 0.23 K/UL (ref 0.05–0.5)
EOSINOPHILS RELATIVE PERCENT: 3 % (ref 0–6)
ERYTHROCYTE [DISTWIDTH] IN BLOOD BY AUTOMATED COUNT: 12.7 % (ref 11.5–15)
GFR, ESTIMATED: >90 ML/MIN/1.73M2
GLUCOSE SERPL-MCNC: 97 MG/DL (ref 74–99)
HCT VFR BLD AUTO: 29.6 % (ref 37–54)
HGB BLD-MCNC: 9.6 G/DL (ref 12.5–16.5)
LYMPHOCYTES NFR BLD: 1.28 K/UL (ref 1.5–4)
LYMPHOCYTES RELATIVE PERCENT: 15 % (ref 20–42)
MCH RBC QN AUTO: 28.8 PG (ref 26–35)
MCHC RBC AUTO-ENTMCNC: 32.4 G/DL (ref 32–34.5)
MCV RBC AUTO: 88.9 FL (ref 80–99.9)
MONOCYTES NFR BLD: 0.53 K/UL (ref 0.1–0.95)
MONOCYTES NFR BLD: 6 % (ref 2–12)
MYELOCYTES ABSOLUTE COUNT: 0.23 K/UL
MYELOCYTES: 3 %
NEUTROPHILS NFR BLD: 70 % (ref 43–80)
NEUTS SEG NFR BLD: 6.08 K/UL (ref 1.8–7.3)
PLATELET # BLD AUTO: 372 K/UL (ref 130–450)
PMV BLD AUTO: 10.6 FL (ref 7–12)
POTASSIUM SERPL-SCNC: 4 MMOL/L (ref 3.5–5)
PROMYELOCYTES ABSOLUTE COUNT: 0.08 K/UL
PROMYELOCYTES: 1 %
RBC # BLD AUTO: 3.33 M/UL (ref 3.8–5.8)
RBC # BLD: ABNORMAL 10*6/UL
SODIUM SERPL-SCNC: 137 MMOL/L (ref 132–146)
WBC OTHER # BLD: 8.7 K/UL (ref 4.5–11.5)

## 2024-12-08 PROCEDURE — 2580000003 HC RX 258

## 2024-12-08 PROCEDURE — 6370000000 HC RX 637 (ALT 250 FOR IP)

## 2024-12-08 PROCEDURE — 36415 COLL VENOUS BLD VENIPUNCTURE: CPT

## 2024-12-08 PROCEDURE — 99232 SBSQ HOSP IP/OBS MODERATE 35: CPT | Performed by: STUDENT IN AN ORGANIZED HEALTH CARE EDUCATION/TRAINING PROGRAM

## 2024-12-08 PROCEDURE — 6360000002 HC RX W HCPCS: Performed by: STUDENT IN AN ORGANIZED HEALTH CARE EDUCATION/TRAINING PROGRAM

## 2024-12-08 PROCEDURE — 85025 COMPLETE CBC W/AUTO DIFF WBC: CPT

## 2024-12-08 PROCEDURE — 1200000000 HC SEMI PRIVATE

## 2024-12-08 PROCEDURE — 80048 BASIC METABOLIC PNL TOTAL CA: CPT

## 2024-12-08 RX ORDER — BISACODYL 10 MG
10 SUPPOSITORY, RECTAL RECTAL DAILY PRN
Status: DISCONTINUED | OUTPATIENT
Start: 2024-12-08 | End: 2024-12-11 | Stop reason: HOSPADM

## 2024-12-08 RX ORDER — LACTULOSE 10 G/15ML
30 SOLUTION ORAL 3 TIMES DAILY PRN
Status: DISCONTINUED | OUTPATIENT
Start: 2024-12-08 | End: 2024-12-11 | Stop reason: HOSPADM

## 2024-12-08 RX ADMIN — SENNOSIDES 8.6 MG: 8.6 TABLET, FILM COATED ORAL at 09:03

## 2024-12-08 RX ADMIN — SODIUM CHLORIDE, PRESERVATIVE FREE 10 ML: 5 INJECTION INTRAVENOUS at 20:44

## 2024-12-08 RX ADMIN — SENNOSIDES 8.6 MG: 8.6 TABLET, FILM COATED ORAL at 20:49

## 2024-12-08 RX ADMIN — ENOXAPARIN SODIUM 30 MG: 100 INJECTION SUBCUTANEOUS at 09:03

## 2024-12-08 RX ADMIN — ACETAMINOPHEN 650 MG: 325 TABLET ORAL at 06:10

## 2024-12-08 RX ADMIN — METHOCARBAMOL TABLETS 1000 MG: 500 TABLET, COATED ORAL at 17:13

## 2024-12-08 RX ADMIN — METHOCARBAMOL TABLETS 1000 MG: 500 TABLET, COATED ORAL at 09:05

## 2024-12-08 RX ADMIN — METHOCARBAMOL TABLETS 1000 MG: 500 TABLET, COATED ORAL at 12:55

## 2024-12-08 RX ADMIN — Medication 5 MG: at 20:43

## 2024-12-08 RX ADMIN — ENOXAPARIN SODIUM 30 MG: 100 INJECTION SUBCUTANEOUS at 20:43

## 2024-12-08 RX ADMIN — ACETAMINOPHEN 650 MG: 325 TABLET ORAL at 12:01

## 2024-12-08 RX ADMIN — SODIUM CHLORIDE, PRESERVATIVE FREE 10 ML: 5 INJECTION INTRAVENOUS at 09:07

## 2024-12-08 RX ADMIN — METHOCARBAMOL TABLETS 1000 MG: 500 TABLET, COATED ORAL at 20:43

## 2024-12-08 RX ADMIN — ACETAMINOPHEN 650 MG: 325 TABLET ORAL at 17:13

## 2024-12-08 ASSESSMENT — PAIN DESCRIPTION - ORIENTATION
ORIENTATION: LEFT

## 2024-12-08 ASSESSMENT — PAIN DESCRIPTION - LOCATION
LOCATION: ARM
LOCATION: ARM;HIP
LOCATION: ARM
LOCATION: ARM

## 2024-12-08 ASSESSMENT — PAIN SCALES - GENERAL
PAINLEVEL_OUTOF10: 0
PAINLEVEL_OUTOF10: 2
PAINLEVEL_OUTOF10: 3

## 2024-12-08 ASSESSMENT — PAIN DESCRIPTION - DESCRIPTORS
DESCRIPTORS: ACHING;DULL;DISCOMFORT
DESCRIPTORS: ACHING;THROBBING

## 2024-12-08 ASSESSMENT — PAIN - FUNCTIONAL ASSESSMENT
PAIN_FUNCTIONAL_ASSESSMENT: PREVENTS OR INTERFERES SOME ACTIVE ACTIVITIES AND ADLS

## 2024-12-08 ASSESSMENT — PAIN DESCRIPTION - ONSET: ONSET: GRADUAL

## 2024-12-08 ASSESSMENT — PAIN DESCRIPTION - PAIN TYPE: TYPE: ACUTE PAIN;SURGICAL PAIN

## 2024-12-08 ASSESSMENT — PAIN DESCRIPTION - FREQUENCY: FREQUENCY: INTERMITTENT

## 2024-12-09 LAB
ANION GAP SERPL CALCULATED.3IONS-SCNC: 9 MMOL/L (ref 7–16)
ATYPICAL LYMPHOCYTE ABSOLUTE COUNT: 0.07 K/UL (ref 0–0.46)
ATYPICAL LYMPHOCYTES: 1 % (ref 0–4)
BASOPHILS # BLD: 0.07 K/UL (ref 0–0.2)
BASOPHILS NFR BLD: 1 % (ref 0–2)
BUN SERPL-MCNC: 25 MG/DL (ref 6–20)
CALCIUM SERPL-MCNC: 9.1 MG/DL (ref 8.6–10.2)
CHLORIDE SERPL-SCNC: 103 MMOL/L (ref 98–107)
CO2 SERPL-SCNC: 26 MMOL/L (ref 22–29)
CREAT SERPL-MCNC: 0.9 MG/DL (ref 0.7–1.2)
EOSINOPHIL # BLD: 0.21 K/UL (ref 0.05–0.5)
EOSINOPHILS RELATIVE PERCENT: 3 % (ref 0–6)
ERYTHROCYTE [DISTWIDTH] IN BLOOD BY AUTOMATED COUNT: 13 % (ref 11.5–15)
GFR, ESTIMATED: >90 ML/MIN/1.73M2
GLUCOSE SERPL-MCNC: 105 MG/DL (ref 74–99)
HCT VFR BLD AUTO: 28.9 % (ref 37–54)
HGB BLD-MCNC: 9.5 G/DL (ref 12.5–16.5)
LYMPHOCYTES NFR BLD: 1.35 K/UL (ref 1.5–4)
LYMPHOCYTES RELATIVE PERCENT: 17 % (ref 20–42)
MCH RBC QN AUTO: 29.3 PG (ref 26–35)
MCHC RBC AUTO-ENTMCNC: 32.9 G/DL (ref 32–34.5)
MCV RBC AUTO: 89.2 FL (ref 80–99.9)
MONOCYTES NFR BLD: 0.43 K/UL (ref 0.1–0.95)
MONOCYTES NFR BLD: 5 % (ref 2–12)
MYELOCYTES ABSOLUTE COUNT: 0.07 K/UL
MYELOCYTES: 1 %
NEUTROPHILS NFR BLD: 73 % (ref 43–80)
NEUTS SEG NFR BLD: 5.99 K/UL (ref 1.8–7.3)
PLATELET # BLD AUTO: 369 K/UL (ref 130–450)
PMV BLD AUTO: 10.7 FL (ref 7–12)
POTASSIUM SERPL-SCNC: 4 MMOL/L (ref 3.5–5)
RBC # BLD AUTO: 3.24 M/UL (ref 3.8–5.8)
RBC # BLD: ABNORMAL 10*6/UL
SODIUM SERPL-SCNC: 138 MMOL/L (ref 132–146)
WBC OTHER # BLD: 8.2 K/UL (ref 4.5–11.5)

## 2024-12-09 PROCEDURE — 1200000000 HC SEMI PRIVATE

## 2024-12-09 PROCEDURE — 85025 COMPLETE CBC W/AUTO DIFF WBC: CPT

## 2024-12-09 PROCEDURE — 6360000002 HC RX W HCPCS: Performed by: STUDENT IN AN ORGANIZED HEALTH CARE EDUCATION/TRAINING PROGRAM

## 2024-12-09 PROCEDURE — 36415 COLL VENOUS BLD VENIPUNCTURE: CPT

## 2024-12-09 PROCEDURE — 80048 BASIC METABOLIC PNL TOTAL CA: CPT

## 2024-12-09 PROCEDURE — 6370000000 HC RX 637 (ALT 250 FOR IP)

## 2024-12-09 PROCEDURE — 99232 SBSQ HOSP IP/OBS MODERATE 35: CPT | Performed by: SURGERY

## 2024-12-09 PROCEDURE — 2580000003 HC RX 258

## 2024-12-09 RX ORDER — METHOCARBAMOL 1000 MG/1
1000 TABLET, FILM COATED ORAL 4 TIMES DAILY
Qty: 40 TABLET | Refills: 0 | Status: SHIPPED | OUTPATIENT
Start: 2024-12-09 | End: 2024-12-19

## 2024-12-09 RX ORDER — OXYCODONE HYDROCHLORIDE 5 MG/1
5 TABLET ORAL EVERY 6 HOURS PRN
Qty: 28 TABLET | Refills: 0 | Status: SHIPPED | OUTPATIENT
Start: 2024-12-09 | End: 2024-12-16

## 2024-12-09 RX ORDER — METHOCARBAMOL 500 MG/1
500 TABLET, FILM COATED ORAL 4 TIMES DAILY
Status: DISCONTINUED | OUTPATIENT
Start: 2024-12-09 | End: 2024-12-11 | Stop reason: HOSPADM

## 2024-12-09 RX ADMIN — ACETAMINOPHEN 650 MG: 325 TABLET ORAL at 17:13

## 2024-12-09 RX ADMIN — METHOCARBAMOL 500 MG: 500 TABLET ORAL at 08:33

## 2024-12-09 RX ADMIN — ACETAMINOPHEN 650 MG: 325 TABLET ORAL at 06:09

## 2024-12-09 RX ADMIN — POLYETHYLENE GLYCOL 3350 34 G: 17 POWDER, FOR SOLUTION ORAL at 08:33

## 2024-12-09 RX ADMIN — Medication 5 MG: at 21:19

## 2024-12-09 RX ADMIN — SODIUM CHLORIDE, PRESERVATIVE FREE 10 ML: 5 INJECTION INTRAVENOUS at 08:33

## 2024-12-09 RX ADMIN — ENOXAPARIN SODIUM 30 MG: 100 INJECTION SUBCUTANEOUS at 08:33

## 2024-12-09 RX ADMIN — SODIUM CHLORIDE, PRESERVATIVE FREE 10 ML: 5 INJECTION INTRAVENOUS at 21:19

## 2024-12-09 RX ADMIN — METHOCARBAMOL 500 MG: 500 TABLET ORAL at 21:19

## 2024-12-09 RX ADMIN — ACETAMINOPHEN 650 MG: 325 TABLET ORAL at 11:59

## 2024-12-09 RX ADMIN — METHOCARBAMOL 500 MG: 500 TABLET ORAL at 11:59

## 2024-12-09 RX ADMIN — METHOCARBAMOL 500 MG: 500 TABLET ORAL at 17:12

## 2024-12-09 RX ADMIN — ENOXAPARIN SODIUM 30 MG: 100 INJECTION SUBCUTANEOUS at 21:19

## 2024-12-09 RX ADMIN — SENNOSIDES 8.6 MG: 8.6 TABLET, FILM COATED ORAL at 21:19

## 2024-12-09 RX ADMIN — ACETAMINOPHEN 650 MG: 325 TABLET ORAL at 23:40

## 2024-12-09 RX ADMIN — SENNOSIDES 8.6 MG: 8.6 TABLET, FILM COATED ORAL at 08:34

## 2024-12-09 ASSESSMENT — PAIN DESCRIPTION - LOCATION
LOCATION: ARM;BACK;LEG
LOCATION: HAND;ARM;LEG
LOCATION: ARM;LEG

## 2024-12-09 ASSESSMENT — PAIN SCALES - GENERAL
PAINLEVEL_OUTOF10: 4
PAINLEVEL_OUTOF10: 3
PAINLEVEL_OUTOF10: 0
PAINLEVEL_OUTOF10: 3
PAINLEVEL_OUTOF10: 3
PAINLEVEL_OUTOF10: 0

## 2024-12-09 ASSESSMENT — PAIN DESCRIPTION - ORIENTATION
ORIENTATION: LEFT;MID
ORIENTATION: RIGHT;LEFT
ORIENTATION: RIGHT;LEFT

## 2024-12-09 ASSESSMENT — PAIN DESCRIPTION - DESCRIPTORS
DESCRIPTORS: DULL;DISCOMFORT;SORE
DESCRIPTORS: ACHING;DULL;DISCOMFORT;SORE

## 2024-12-09 NOTE — CARE COORDINATION
Social Work/Case Management Transition of Care Planning (Jasmyn [facility] [phone]):  Per report and chart review, ortho surgery and trauma continue to follow patient.  PT/OT updates noted to be 10/13. Patient will need H[facility]IA placement. Met with patient at bedside.  He provided list of placement choices.  His first choice was [facility].  Explained to him he does not meet criteria for LTAC.  He had [facility] listed as his first choice.  Explained they said last week they did not have a bed until Wednesday but SW will check to see if anything has changed.  [facility] and [facility] were then choiced.  Phone call to Brook of [facility].  She stated they do not have a bed now until Thursday.  [facility] to [facility].  They do not have any beds available.  Spoke with Zaira of [facility].  Referral information provided.  Await response.  CM/SW will follow.  Jasmyn Lopes [facility]  12/9/2024    Update:  Per Zaira, they do not have any beds available at [facility].  Spoke with patient and his wife.  Wife will tour some additional facilities and get back to  with list.  CM/SW will follow.  [facility]yn [facility]  12/9/2024    Update:  Wife choiced [facility].  Referral information provided to [facility] for review.  Await response.  CM/SW will follow.  SHREE Wallace  12/9/2024

## 2024-12-10 LAB
ANION GAP SERPL CALCULATED.3IONS-SCNC: 12 MMOL/L (ref 7–16)
BASOPHILS # BLD: 0.02 K/UL (ref 0–0.2)
BASOPHILS NFR BLD: 0 % (ref 0–2)
BUN SERPL-MCNC: 34 MG/DL (ref 6–20)
CALCIUM SERPL-MCNC: 8.9 MG/DL (ref 8.6–10.2)
CHLORIDE SERPL-SCNC: 105 MMOL/L (ref 98–107)
CO2 SERPL-SCNC: 22 MMOL/L (ref 22–29)
CREAT SERPL-MCNC: 0.9 MG/DL (ref 0.7–1.2)
EOSINOPHIL # BLD: 0.06 K/UL (ref 0.05–0.5)
EOSINOPHILS RELATIVE PERCENT: 0 % (ref 0–6)
ERYTHROCYTE [DISTWIDTH] IN BLOOD BY AUTOMATED COUNT: 13.2 % (ref 11.5–15)
GFR, ESTIMATED: >90 ML/MIN/1.73M2
GLUCOSE SERPL-MCNC: 118 MG/DL (ref 74–99)
HCT VFR BLD AUTO: 33 % (ref 37–54)
HGB BLD-MCNC: 10.8 G/DL (ref 12.5–16.5)
IMM GRANULOCYTES # BLD AUTO: 0.26 K/UL (ref 0–0.58)
IMM GRANULOCYTES NFR BLD: 2 % (ref 0–5)
LYMPHOCYTES NFR BLD: 0.33 K/UL (ref 1.5–4)
LYMPHOCYTES RELATIVE PERCENT: 2 % (ref 20–42)
MCH RBC QN AUTO: 29.3 PG (ref 26–35)
MCHC RBC AUTO-ENTMCNC: 32.7 G/DL (ref 32–34.5)
MCV RBC AUTO: 89.7 FL (ref 80–99.9)
MONOCYTES NFR BLD: 0.4 K/UL (ref 0.1–0.95)
MONOCYTES NFR BLD: 3 % (ref 2–12)
NEUTROPHILS NFR BLD: 93 % (ref 43–80)
NEUTS SEG NFR BLD: 14.15 K/UL (ref 1.8–7.3)
PLATELET # BLD AUTO: 426 K/UL (ref 130–450)
PMV BLD AUTO: 10.8 FL (ref 7–12)
POTASSIUM SERPL-SCNC: 4.4 MMOL/L (ref 3.5–5)
RBC # BLD AUTO: 3.68 M/UL (ref 3.8–5.8)
RBC # BLD: ABNORMAL 10*6/UL
SODIUM SERPL-SCNC: 139 MMOL/L (ref 132–146)
WBC OTHER # BLD: 15.2 K/UL (ref 4.5–11.5)

## 2024-12-10 PROCEDURE — 6360000002 HC RX W HCPCS: Performed by: STUDENT IN AN ORGANIZED HEALTH CARE EDUCATION/TRAINING PROGRAM

## 2024-12-10 PROCEDURE — 99232 SBSQ HOSP IP/OBS MODERATE 35: CPT | Performed by: SURGERY

## 2024-12-10 PROCEDURE — 97530 THERAPEUTIC ACTIVITIES: CPT

## 2024-12-10 PROCEDURE — 97535 SELF CARE MNGMENT TRAINING: CPT

## 2024-12-10 PROCEDURE — 85025 COMPLETE CBC W/AUTO DIFF WBC: CPT

## 2024-12-10 PROCEDURE — 80048 BASIC METABOLIC PNL TOTAL CA: CPT

## 2024-12-10 PROCEDURE — 6370000000 HC RX 637 (ALT 250 FOR IP)

## 2024-12-10 PROCEDURE — 2580000003 HC RX 258

## 2024-12-10 PROCEDURE — 1200000000 HC SEMI PRIVATE

## 2024-12-10 PROCEDURE — 36415 COLL VENOUS BLD VENIPUNCTURE: CPT

## 2024-12-10 RX ADMIN — SENNOSIDES 8.6 MG: 8.6 TABLET, FILM COATED ORAL at 08:48

## 2024-12-10 RX ADMIN — ACETAMINOPHEN 650 MG: 325 TABLET ORAL at 18:37

## 2024-12-10 RX ADMIN — Medication 5 MG: at 19:39

## 2024-12-10 RX ADMIN — ENOXAPARIN SODIUM 30 MG: 100 INJECTION SUBCUTANEOUS at 19:40

## 2024-12-10 RX ADMIN — METHOCARBAMOL 500 MG: 500 TABLET ORAL at 12:52

## 2024-12-10 RX ADMIN — POLYETHYLENE GLYCOL 3350 34 G: 17 POWDER, FOR SOLUTION ORAL at 08:47

## 2024-12-10 RX ADMIN — METHOCARBAMOL 500 MG: 500 TABLET ORAL at 18:37

## 2024-12-10 RX ADMIN — METHOCARBAMOL 500 MG: 500 TABLET ORAL at 19:39

## 2024-12-10 RX ADMIN — SODIUM CHLORIDE, PRESERVATIVE FREE 10 ML: 5 INJECTION INTRAVENOUS at 08:51

## 2024-12-10 RX ADMIN — SODIUM CHLORIDE, PRESERVATIVE FREE 10 ML: 5 INJECTION INTRAVENOUS at 19:40

## 2024-12-10 RX ADMIN — ACETAMINOPHEN 650 MG: 325 TABLET ORAL at 06:05

## 2024-12-10 RX ADMIN — SENNOSIDES 8.6 MG: 8.6 TABLET, FILM COATED ORAL at 19:40

## 2024-12-10 RX ADMIN — METHOCARBAMOL 500 MG: 500 TABLET ORAL at 08:48

## 2024-12-10 RX ADMIN — ACETAMINOPHEN 650 MG: 325 TABLET ORAL at 12:52

## 2024-12-10 RX ADMIN — ENOXAPARIN SODIUM 30 MG: 100 INJECTION SUBCUTANEOUS at 08:47

## 2024-12-10 ASSESSMENT — PAIN SCALES - GENERAL
PAINLEVEL_OUTOF10: 0
PAINLEVEL_OUTOF10: 3

## 2024-12-10 ASSESSMENT — PAIN DESCRIPTION - ORIENTATION: ORIENTATION: LEFT

## 2024-12-10 ASSESSMENT — PAIN DESCRIPTION - DESCRIPTORS: DESCRIPTORS: ACHING;DULL;DISCOMFORT

## 2024-12-10 ASSESSMENT — PAIN DESCRIPTION - LOCATION: LOCATION: ARM;LEG

## 2024-12-10 ASSESSMENT — PAIN SCALES - WONG BAKER: WONGBAKER_NUMERICALRESPONSE: NO HURT

## 2024-12-10 ASSESSMENT — PAIN - FUNCTIONAL ASSESSMENT: PAIN_FUNCTIONAL_ASSESSMENT: PREVENTS OR INTERFERES SOME ACTIVE ACTIVITIES AND ADLS

## 2024-12-10 NOTE — DISCHARGE INSTR - COC
Continuity of Care Form    Patient Name: Issac Ramirez   :  1965  MRN:  [id number]    Admit date:  2024  Discharge date:  24    Code Status Order: Full Code   Advance Directives:   Advance Care Flowsheet Documentation        Date/Time Healthcare Directive Type of Healthcare Directive Copy in Chart Healthcare Agent Appointed Healthcare Agent's Name Healthcare Agent's Phone Number    24 0549 No, patient does not have an advance directive for healthcare treatment  --  --  --  --  --     24 0315 No, patient does not have an advance directive for healthcare treatment  --  --  --  --  --                     Admitting Physician:  Jameson Romo MD  PCP: No primary care provider on file.    Discharging Nurse:   Discharging Hospital Unit/Room#: 8414/8414-A  Discharging Unit Phone Number: [phone]    Emergency Contact:   Extended Emergency Contact Information  Primary Emergency Contact: Kellie Ramirez  Home Phone: [phone]  Relation: Spouse   needed? No  Secondary Emergency Contact: Issac Ramirez II  Mobile Phone: [phone]  Relation: Child    Past Surgical History:  Past Surgical History:   Procedure Laterality Date    ARM SURGERY Left 2024    LEFT FOREARM IRRIGATION AND DEBRIDEMENT, WOUND VAC EXCHANGE performed by Jamie Maher DO at [facility] OR       Immunization History:   Immunization History   Administered Date(s) Administered    COVID-19, PFIZER Bivalent, DO NOT Dilute, (age 12y+), IM, 30 mcg/0.3 mL 2022    COVID-19, PFIZER PURPLE top, DILUTE for use, (age 12 y+), 30mcg/0.3mL 2021       Active Problems:  Patient Active Problem List   Diagnosis Code    Accidental fall from ladder W11.XXXA    Multiple trauma T07.XXXA    Closed displaced fracture of left acetabulum (HCC) S32.402A    Open fracture of left distal radius and ulna S52.502B, S52.602B    Dislocation of left elbow S53.105A       Isolation/Infection:   Isolation            No Isolation

## 2024-12-10 NOTE — CARE COORDINATION
Social Work/Case Management Transition of Care Planning (Jasmyn Lopes [facility] [phone]): Per report and chart review, wound vac to left arm. Patient is TTWB LLE.  NWB LUE through the waist.  Met with patient at bedside.  He has been accepted at [facility].  Pre-cert started today.  Brent/destination completed.  7000 completed.  Discharge envelope with ambulance form is in the soft chart.  Phone call to patient's wife to update as well.  RYAN/MICHAEL will follow.  SHREE Wallace  12/10/2024    Update:  Received message from patient's wife stating she wants referral information sent to [facility] as well as to [facility].  SW called wife to further discuss.  She stated her cousin suggested these facilities be explored before going to [facility].  Again explained patient does not have the medical necessity for [facility] as it is an LTAC.  Wife expressed understanding.  Also explained to wife pre-cert was already started for [facility] which was choiced by her and changing this could result in a delay in discharge.  She stated it is their choice to have referral made to [facility].  Referral information provided to [facility] for review.  RYAN/MICHAEL will follow.  SHREE Wallace  12/10/2024

## 2024-12-11 ENCOUNTER — APPOINTMENT (OUTPATIENT)
Dept: ULTRASOUND IMAGING | Age: 59
DRG: 958 | End: 2024-12-11
Payer: COMMERCIAL

## 2024-12-11 VITALS
WEIGHT: 172 LBS | HEIGHT: 67 IN | HEART RATE: 71 BPM | RESPIRATION RATE: 18 BRPM | SYSTOLIC BLOOD PRESSURE: 110 MMHG | DIASTOLIC BLOOD PRESSURE: 66 MMHG | TEMPERATURE: 98.4 F | BODY MASS INDEX: 27 KG/M2 | OXYGEN SATURATION: 97 %

## 2024-12-11 LAB
ANION GAP SERPL CALCULATED.3IONS-SCNC: 10 MMOL/L (ref 7–16)
BASOPHILS # BLD: 0.04 K/UL (ref 0–0.2)
BASOPHILS NFR BLD: 1 % (ref 0–2)
BUN SERPL-MCNC: 27 MG/DL (ref 6–20)
CALCIUM SERPL-MCNC: 8.6 MG/DL (ref 8.6–10.2)
CHLORIDE SERPL-SCNC: 99 MMOL/L (ref 98–107)
CO2 SERPL-SCNC: 23 MMOL/L (ref 22–29)
CREAT SERPL-MCNC: 0.8 MG/DL (ref 0.7–1.2)
EOSINOPHIL # BLD: 0.05 K/UL (ref 0.05–0.5)
EOSINOPHILS RELATIVE PERCENT: 1 % (ref 0–6)
ERYTHROCYTE [DISTWIDTH] IN BLOOD BY AUTOMATED COUNT: 13.5 % (ref 11.5–15)
GFR, ESTIMATED: >90 ML/MIN/1.73M2
GLUCOSE SERPL-MCNC: 95 MG/DL (ref 74–99)
HCT VFR BLD AUTO: 31.2 % (ref 37–54)
HGB BLD-MCNC: 10.1 G/DL (ref 12.5–16.5)
IMM GRANULOCYTES # BLD AUTO: 0.15 K/UL (ref 0–0.58)
IMM GRANULOCYTES NFR BLD: 2 % (ref 0–5)
LYMPHOCYTES NFR BLD: 0.89 K/UL (ref 1.5–4)
LYMPHOCYTES RELATIVE PERCENT: 13 % (ref 20–42)
MCH RBC QN AUTO: 29.1 PG (ref 26–35)
MCHC RBC AUTO-ENTMCNC: 32.4 G/DL (ref 32–34.5)
MCV RBC AUTO: 89.9 FL (ref 80–99.9)
MONOCYTES NFR BLD: 0.55 K/UL (ref 0.1–0.95)
MONOCYTES NFR BLD: 8 % (ref 2–12)
NEUTROPHILS NFR BLD: 75 % (ref 43–80)
NEUTS SEG NFR BLD: 5.02 K/UL (ref 1.8–7.3)
PLATELET # BLD AUTO: 403 K/UL (ref 130–450)
PMV BLD AUTO: 10.5 FL (ref 7–12)
POTASSIUM SERPL-SCNC: 3.8 MMOL/L (ref 3.5–5)
RBC # BLD AUTO: 3.47 M/UL (ref 3.8–5.8)
SODIUM SERPL-SCNC: 132 MMOL/L (ref 132–146)
WBC OTHER # BLD: 6.7 K/UL (ref 4.5–11.5)

## 2024-12-11 PROCEDURE — 2580000003 HC RX 258

## 2024-12-11 PROCEDURE — 6370000000 HC RX 637 (ALT 250 FOR IP)

## 2024-12-11 PROCEDURE — 80048 BASIC METABOLIC PNL TOTAL CA: CPT

## 2024-12-11 PROCEDURE — 6360000002 HC RX W HCPCS: Performed by: STUDENT IN AN ORGANIZED HEALTH CARE EDUCATION/TRAINING PROGRAM

## 2024-12-11 PROCEDURE — 85025 COMPLETE CBC W/AUTO DIFF WBC: CPT

## 2024-12-11 PROCEDURE — 36415 COLL VENOUS BLD VENIPUNCTURE: CPT

## 2024-12-11 PROCEDURE — 93970 EXTREMITY STUDY: CPT

## 2024-12-11 RX ADMIN — SODIUM CHLORIDE, PRESERVATIVE FREE 10 ML: 5 INJECTION INTRAVENOUS at 08:22

## 2024-12-11 RX ADMIN — ACETAMINOPHEN 650 MG: 325 TABLET ORAL at 12:41

## 2024-12-11 RX ADMIN — METHOCARBAMOL 500 MG: 500 TABLET ORAL at 08:21

## 2024-12-11 RX ADMIN — ENOXAPARIN SODIUM 30 MG: 100 INJECTION SUBCUTANEOUS at 08:21

## 2024-12-11 RX ADMIN — ACETAMINOPHEN 650 MG: 325 TABLET ORAL at 16:27

## 2024-12-11 RX ADMIN — ACETAMINOPHEN 650 MG: 325 TABLET ORAL at 06:44

## 2024-12-11 RX ADMIN — POTASSIUM BICARBONATE 20 MEQ: 782 TABLET, EFFERVESCENT ORAL at 06:44

## 2024-12-11 RX ADMIN — METHOCARBAMOL 500 MG: 500 TABLET ORAL at 12:41

## 2024-12-11 RX ADMIN — METHOCARBAMOL 500 MG: 500 TABLET ORAL at 16:27

## 2024-12-11 ASSESSMENT — PAIN DESCRIPTION - DESCRIPTORS: DESCRIPTORS: ACHING;DULL;DISCOMFORT;SORE

## 2024-12-11 ASSESSMENT — PAIN SCALES - GENERAL: PAINLEVEL_OUTOF10: 3

## 2024-12-11 ASSESSMENT — PAIN - FUNCTIONAL ASSESSMENT: PAIN_FUNCTIONAL_ASSESSMENT: PREVENTS OR INTERFERES SOME ACTIVE ACTIVITIES AND ADLS

## 2024-12-11 ASSESSMENT — PAIN DESCRIPTION - ORIENTATION: ORIENTATION: LEFT

## 2024-12-11 ASSESSMENT — PAIN DESCRIPTION - LOCATION: LOCATION: ARM;LEG

## 2024-12-11 NOTE — CARE COORDINATION
Transition of Care-Call to wife Kellie to update that the [facility] the [facility] liaison cannot accept d/t patient acuity level. Wife is ok with transfer to [facility] once auth obtained. Confirmed with [facility] liaison pre-cert is still pending. Discharge plan is [facility]. MG, HENS and transport forms completed.  1400-Auth obtained-Perfect Serve message to [facility] team to prompt for discharge.Await discharge if cleared today.    Addendum-PAS to  patient at [phone], wife updated and bedside RN as well as facility.    Vijaya DONALDSONN, RN  [facility]

## 2024-12-11 NOTE — PLAN OF CARE
Problem: Pain  Goal: Verbalizes/displays adequate comfort level or baseline comfort level  12/8/2024 2309 by Silvia Johns, RN  Outcome: Progressing     Problem: Skin/Tissue Integrity  Goal: Absence of new skin breakdown  Description: 1.  Monitor for areas of redness and/or skin breakdown  2.  Assess vascular access sites hourly  3.  Every 4-6 hours minimum:  Change oxygen saturation probe site  4.  Every 4-6 hours:  If on nasal continuous positive airway pressure, respiratory therapy assess nares and determine need for appliance change or resting period.  12/8/2024 2309 by Silvia Johns, RN  Outcome: Progressing     Problem: Musculoskeletal - Adult  Goal: Return mobility to safest level of function  12/8/2024 2309 by Silvia Johns RN  Outcome: Progressing     Problem: Musculoskeletal - Adult  Goal: Maintain proper alignment of affected body part  12/8/2024 2309 by Silvia Johns, RN  Outcome: Progressing     Problem: Musculoskeletal - Adult  Goal: Return ADL status to a safe level of function  12/8/2024 2309 by Silvia Johns, RN  Outcome: Progressing     Problem: Discharge Planning  Goal: Discharge to home or other facility with appropriate resources  12/8/2024 2309 by Silvia Johns, RN  Outcome: Progressing     Problem: ABCDS Injury Assessment  Goal: Absence of physical injury  Outcome: Progressing     Problem: Nutrition Deficit:  Goal: Optimize nutritional status  Outcome: Progressing     Problem: Safety - Adult  Goal: Free from fall injury  12/8/2024 2309 by Silvia Johns, RN  Outcome: Progressing     
  Problem: Safety - Adult  Goal: Free from fall injury  11/27/2024 1032 by Valeri Albarran RN  Outcome: Progressing  11/27/2024 0003 by Marcelle Rivera RN  Outcome: Progressing     Problem: Pain  Goal: Verbalizes/displays adequate comfort level or baseline comfort level  11/27/2024 1032 by Valeri Albarran RN  Outcome: Progressing  11/27/2024 0003 by Marcelle Rivera RN  Outcome: Progressing     Problem: Skin/Tissue Integrity  Goal: Absence of new skin breakdown  Description: 1.  Monitor for areas of redness and/or skin breakdown  2.  Assess vascular access sites hourly  3.  Every 4-6 hours minimum:  Change oxygen saturation probe site  4.  Every 4-6 hours:  If on nasal continuous positive airway pressure, respiratory therapy assess nares and determine need for appliance change or resting period.  11/27/2024 1032 by Valeri Albarran RN  Outcome: Progressing  11/27/2024 0003 by Marcelle Rivera RN  Outcome: Progressing     Problem: Musculoskeletal - Adult  Goal: Return mobility to safest level of function  11/27/2024 1032 by Valeri Albarran RN  Outcome: Progressing  11/27/2024 0003 by Marcelle Rivera RN  Outcome: Progressing  Goal: Maintain proper alignment of affected body part  11/27/2024 1032 by Valeri Albarran RN  Outcome: Progressing  11/27/2024 0003 by Marcelle Rivera RN  Outcome: Progressing  Goal: Return ADL status to a safe level of function  11/27/2024 1032 by Valeri Albarran RN  Outcome: Progressing  11/27/2024 0003 by Marcelle Rivera RN  Outcome: Progressing     
  Problem: Safety - Adult  Goal: Free from fall injury  11/28/2024 1328 by Trish Su RN  Outcome: Progressing  11/28/2024 0210 by Violeta Coates RN  Outcome: Progressing     Problem: Pain  Goal: Verbalizes/displays adequate comfort level or baseline comfort level  11/28/2024 1328 by Trish Su RN  Outcome: Progressing  11/28/2024 0210 by Violeta Coates RN  Outcome: Progressing     Problem: Skin/Tissue Integrity  Goal: Absence of new skin breakdown  Description: 1.  Monitor for areas of redness and/or skin breakdown  2.  Assess vascular access sites hourly  3.  Every 4-6 hours minimum:  Change oxygen saturation probe site  4.  Every 4-6 hours:  If on nasal continuous positive airway pressure, respiratory therapy assess nares and determine need for appliance change or resting period.  11/28/2024 1328 by Trish Su RN  Outcome: Progressing  11/28/2024 0210 by Violeta Coates RN  Outcome: Progressing     Problem: Musculoskeletal - Adult  Goal: Return mobility to safest level of function  11/28/2024 1328 by Trish Su RN  Outcome: Progressing  11/28/2024 0210 by Violeta Coates RN  Outcome: Progressing  Goal: Maintain proper alignment of affected body part  11/28/2024 1328 by Trish Su RN  Outcome: Progressing  11/28/2024 0210 by Violeta Coates RN  Outcome: Progressing  Goal: Return ADL status to a safe level of function  11/28/2024 1328 by Trish Su RN  Outcome: Progressing  11/28/2024 0210 by Violeta Coates RN  Outcome: Progressing     Problem: Discharge Planning  Goal: Discharge to home or other facility with appropriate resources  Outcome: Progressing     
  Problem: Safety - Adult  Goal: Free from fall injury  11/29/2024 2251 by Sofi Puckett RN  Outcome: Progressing  11/29/2024 2251 by Sofi Puckett RN  Outcome: Progressing  11/29/2024 0903 by Trish Su RN  Outcome: Progressing     Problem: Pain  Goal: Verbalizes/displays adequate comfort level or baseline comfort level  11/29/2024 2251 by Sofi Puckett RN  Outcome: Progressing  11/29/2024 2251 by Sofi Puckett RN  Outcome: Progressing  11/29/2024 0903 by Trish Su RN  Outcome: Progressing  Flowsheets (Taken 11/29/2024 0815)  Verbalizes/displays adequate comfort level or baseline comfort level: Encourage patient to monitor pain and request assistance     Problem: Skin/Tissue Integrity  Goal: Absence of new skin breakdown  Description: 1.  Monitor for areas of redness and/or skin breakdown  2.  Assess vascular access sites hourly  3.  Every 4-6 hours minimum:  Change oxygen saturation probe site  4.  Every 4-6 hours:  If on nasal continuous positive airway pressure, respiratory therapy assess nares and determine need for appliance change or resting period.  11/29/2024 2251 by Sofi Puckett RN  Outcome: Progressing  11/29/2024 0903 by Trish Su RN  Outcome: Progressing     Problem: Musculoskeletal - Adult  Goal: Return mobility to safest level of function  11/29/2024 0903 by Trish Su RN  Outcome: Progressing  Goal: Maintain proper alignment of affected body part  11/29/2024 0903 by Trish Su RN  Outcome: Progressing  Goal: Return ADL status to a safe level of function  11/29/2024 0903 by Trish Su RN  Outcome: Progressing     Problem: Discharge Planning  Goal: Discharge to home or other facility with appropriate resources  11/29/2024 0903 by Trish Su RN  Outcome: Progressing     
  Problem: Safety - Adult  Goal: Free from fall injury  11/30/2024 1211 by Trish Su RN  Outcome: Progressing  11/29/2024 2251 by Sofi Puckett RN  Outcome: Progressing  11/29/2024 2251 by Sofi Puckett RN  Outcome: Progressing     Problem: Pain  Goal: Verbalizes/displays adequate comfort level or baseline comfort level  11/30/2024 1211 by Trish Su RN  Outcome: Progressing  11/29/2024 2251 by Sofi Puckett RN  Outcome: Progressing  11/29/2024 2251 by Sofi Puckett RN  Outcome: Progressing     Problem: Skin/Tissue Integrity  Goal: Absence of new skin breakdown  Description: 1.  Monitor for areas of redness and/or skin breakdown  2.  Assess vascular access sites hourly  3.  Every 4-6 hours minimum:  Change oxygen saturation probe site  4.  Every 4-6 hours:  If on nasal continuous positive airway pressure, respiratory therapy assess nares and determine need for appliance change or resting period.  11/30/2024 1211 by Trish Su RN  Outcome: Progressing  11/29/2024 2251 by Sofi Puckett RN  Outcome: Progressing     Problem: Musculoskeletal - Adult  Goal: Return mobility to safest level of function  Outcome: Progressing  Goal: Maintain proper alignment of affected body part  Outcome: Progressing  Goal: Return ADL status to a safe level of function  Outcome: Progressing     Problem: Discharge Planning  Goal: Discharge to home or other facility with appropriate resources  Outcome: Progressing     
  Problem: Safety - Adult  Goal: Free from fall injury  11/30/2024 2237 by Karishma Candelario, RN  Outcome: Progressing  11/30/2024 1211 by Trish Su RN  Outcome: Progressing     Problem: Pain  Goal: Verbalizes/displays adequate comfort level or baseline comfort level  11/30/2024 2237 by Karishma Candelario, RN  Outcome: Progressing  11/30/2024 1211 by Trish Su RN  Outcome: Progressing     Problem: Skin/Tissue Integrity  Goal: Absence of new skin breakdown  Description: 1.  Monitor for areas of redness and/or skin breakdown  2.  Assess vascular access sites hourly  3.  Every 4-6 hours minimum:  Change oxygen saturation probe site  4.  Every 4-6 hours:  If on nasal continuous positive airway pressure, respiratory therapy assess nares and determine need for appliance change or resting period.  11/30/2024 2237 by Karishma Candelario RN  Outcome: Progressing  11/30/2024 1211 by Trish Su RN  Outcome: Progressing     Problem: Musculoskeletal - Adult  Goal: Return mobility to safest level of function  11/30/2024 1211 by Trish Su RN  Outcome: Progressing  Goal: Maintain proper alignment of affected body part  11/30/2024 1211 by Trish Su RN  Outcome: Progressing  Goal: Return ADL status to a safe level of function  11/30/2024 1211 by Trish Su RN  Outcome: Progressing     Problem: Discharge Planning  Goal: Discharge to home or other facility with appropriate resources  11/30/2024 1211 by Trish Su RN  Outcome: Progressing     
  Problem: Safety - Adult  Goal: Free from fall injury  12/1/2024 2225 by Silvia Johns RN  Outcome: Progressing  12/1/2024 1328 by Rebecca Chow RN  Outcome: Progressing     Problem: Pain  Goal: Verbalizes/displays adequate comfort level or baseline comfort level  12/1/2024 2225 by Silvia Johns RN  Outcome: Progressing  12/1/2024 1328 by Rebecca Chow RN  Outcome: Progressing     Problem: Skin/Tissue Integrity  Goal: Absence of new skin breakdown  Description: 1.  Monitor for areas of redness and/or skin breakdown  2.  Assess vascular access sites hourly  3.  Every 4-6 hours minimum:  Change oxygen saturation probe site  4.  Every 4-6 hours:  If on nasal continuous positive airway pressure, respiratory therapy assess nares and determine need for appliance change or resting period.  12/1/2024 2225 by Silvia Johns RN  Outcome: Progressing  12/1/2024 1328 by Rebecca Chow RN  Outcome: Progressing     Problem: Musculoskeletal - Adult  Goal: Return mobility to safest level of function  12/1/2024 2225 by Silvia Johns RN  Outcome: Progressing  12/1/2024 1328 by Rebecca Chow RN  Outcome: Progressing  Goal: Maintain proper alignment of affected body part  12/1/2024 2225 by Silvia Johns RN  Outcome: Progressing  12/1/2024 1328 by Rebecca Chow RN  Outcome: Progressing  Goal: Return ADL status to a safe level of function  12/1/2024 2225 by Silvia Johns RN  Outcome: Progressing  12/1/2024 1328 by Rebecca Chow RN  Outcome: Progressing     Problem: Discharge Planning  Goal: Discharge to home or other facility with appropriate resources  12/1/2024 2225 by Silvia Johns RN  Outcome: Progressing  12/1/2024 1328 by Rebecca Chow RN  Outcome: Progressing     
  Problem: Safety - Adult  Goal: Free from fall injury  12/10/2024 2128 by Silvia Johns RN  Outcome: Progressing     Problem: Pain  Goal: Verbalizes/displays adequate comfort level or baseline comfort level  12/10/2024 2128 by Silvia Johns RN  Outcome: Progressing     Problem: Skin/Tissue Integrity  Goal: Absence of new skin breakdown  Description: 1.  Monitor for areas of redness and/or skin breakdown  2.  Assess vascular access sites hourly  3.  Every 4-6 hours minimum:  Change oxygen saturation probe site  4.  Every 4-6 hours:  If on nasal continuous positive airway pressure, respiratory therapy assess nares and determine need for appliance change or resting period.  12/10/2024 2128 by Silvia Johns RN  Outcome: Progressing     Problem: Musculoskeletal - Adult  Goal: Return mobility to safest level of function  12/10/2024 2128 by Silvia Johns RN  Outcome: Progressing     Problem: Musculoskeletal - Adult  Goal: Maintain proper alignment of affected body part  12/10/2024 2128 by Silvia Johns RN  Outcome: Progressing     Problem: Musculoskeletal - Adult  Goal: Return ADL status to a safe level of function  12/10/2024 2128 by Silvia Johns RN  Outcome: Progressing     Problem: Discharge Planning  Goal: Discharge to home or other facility with appropriate resources  12/10/2024 2128 by Silvia Johns RN  Outcome: Progressing     Problem: ABCDS Injury Assessment  Goal: Absence of physical injury  12/10/2024 2128 by Silvia Johns RN  Outcome: Progressing     Problem: Nutrition Deficit:  Goal: Optimize nutritional status  12/10/2024 2128 by Silvia Johns RN  Outcome: Progressing     
  Problem: Safety - Adult  Goal: Free from fall injury  12/11/2024 0942 by Valeri Albarran RN  Outcome: Progressing  12/10/2024 2128 by Silvia Johns RN  Outcome: Progressing     Problem: Pain  Goal: Verbalizes/displays adequate comfort level or baseline comfort level  12/11/2024 0942 by Valeri Albarran RN  Outcome: Progressing  12/10/2024 2128 by Silvia Johns RN  Outcome: Progressing     Problem: Skin/Tissue Integrity  Goal: Absence of new skin breakdown  Description: 1.  Monitor for areas of redness and/or skin breakdown  2.  Assess vascular access sites hourly  3.  Every 4-6 hours minimum:  Change oxygen saturation probe site  4.  Every 4-6 hours:  If on nasal continuous positive airway pressure, respiratory therapy assess nares and determine need for appliance change or resting period.  12/11/2024 0942 by Valeri Albarran RN  Outcome: Progressing  12/10/2024 2128 by Silvia Johns RN  Outcome: Progressing     Problem: Musculoskeletal - Adult  Goal: Return mobility to safest level of function  12/11/2024 0942 by Valeri Albarran RN  Outcome: Progressing  12/10/2024 2128 by Silvia Johns RN  Outcome: Progressing  Goal: Maintain proper alignment of affected body part  12/11/2024 0942 by Valeri Albarran RN  Outcome: Progressing  12/10/2024 2128 by Silvia Johns RN  Outcome: Progressing  Goal: Return ADL status to a safe level of function  12/11/2024 0942 by Valeri Albarran RN  Outcome: Progressing  12/10/2024 2128 by Silvia Johns RN  Outcome: Progressing     Problem: Discharge Planning  Goal: Discharge to home or other facility with appropriate resources  12/11/2024 0942 by Valeri Albarran RN  Outcome: Progressing  12/10/2024 2128 by Silvia Johns RN  Outcome: Progressing     Problem: ABCDS Injury Assessment  Goal: Absence of physical injury  12/11/2024 0942 by Valeri Albarran RN  Outcome: Progressing  12/10/2024 2128 by Silvia Johns RN  Outcome: Progressing     Problem: Nutrition 
  Problem: Safety - Adult  Goal: Free from fall injury  12/4/2024 1044 by Josette Falcon RN  Outcome: Progressing  12/4/2024 0115 by Marcelle Rivera RN  Outcome: Progressing  12/3/2024 2224 by Dajuan Sen RN  Outcome: Progressing     Problem: Pain  Goal: Verbalizes/displays adequate comfort level or baseline comfort level  12/4/2024 1044 by Josette Falcon RN  Outcome: Progressing  12/4/2024 0115 by Marcelle Rivera RN  Outcome: Progressing  12/3/2024 2224 by Dajuan Sen RN  Outcome: Progressing     Problem: Skin/Tissue Integrity  Goal: Absence of new skin breakdown  Description: 1.  Monitor for areas of redness and/or skin breakdown  2.  Assess vascular access sites hourly  3.  Every 4-6 hours minimum:  Change oxygen saturation probe site  4.  Every 4-6 hours:  If on nasal continuous positive airway pressure, respiratory therapy assess nares and determine need for appliance change or resting period.  12/4/2024 1044 by Josette Falcon RN  Outcome: Progressing  12/4/2024 0115 by Marcelle Rivera RN  Outcome: Progressing  12/3/2024 2224 by Dajuan Sen RN  Outcome: Progressing     Problem: Musculoskeletal - Adult  Goal: Return mobility to safest level of function  12/4/2024 1044 by Josette Falcon RN  Outcome: Progressing  12/4/2024 0115 by Marcelle Rivera RN  Outcome: Progressing  12/3/2024 2224 by Dajuan Sen RN  Outcome: Progressing  Goal: Maintain proper alignment of affected body part  12/4/2024 1044 by Josette Falcon RN  Outcome: Progressing  12/4/2024 0115 by Marcelle Rivear RN  Outcome: Progressing  12/3/2024 2224 by Dajuan Sen RN  Outcome: Progressing  Goal: Return ADL status to a safe level of function  12/4/2024 1044 by Josette Falcon RN  Outcome: Progressing  12/4/2024 0115 by Marcelle Rivera RN  Outcome: Progressing  12/3/2024 2224 by Dajuan Sen RN  Outcome: Progressing     Problem: Discharge Planning  Goal: Discharge to home or other facility 
  Problem: Safety - Adult  Goal: Free from fall injury  12/6/2024 0754 by Josette Falcon RN  Outcome: Progressing  12/6/2024 0201 by Licha Damon RN  Outcome: Progressing  Flowsheets (Taken 12/5/2024 2115)  Free From Fall Injury: Instruct family/caregiver on patient safety     Problem: Pain  Goal: Verbalizes/displays adequate comfort level or baseline comfort level  12/6/2024 0754 by Josette Falcon RN  Outcome: Progressing  12/6/2024 0201 by Licha Damon RN  Outcome: Progressing     Problem: Skin/Tissue Integrity  Goal: Absence of new skin breakdown  Description: 1.  Monitor for areas of redness and/or skin breakdown  2.  Assess vascular access sites hourly  3.  Every 4-6 hours minimum:  Change oxygen saturation probe site  4.  Every 4-6 hours:  If on nasal continuous positive airway pressure, respiratory therapy assess nares and determine need for appliance change or resting period.  12/6/2024 0754 by Josette Falcon RN  Outcome: Progressing  12/6/2024 0201 by Licha Damon RN  Outcome: Progressing     Problem: Musculoskeletal - Adult  Goal: Return mobility to safest level of function  12/6/2024 0754 by Josette Falcon RN  Outcome: Progressing  12/6/2024 0201 by Licha Damon RN  Outcome: Progressing  Goal: Maintain proper alignment of affected body part  12/6/2024 0754 by Josette Falcon RN  Outcome: Progressing  12/6/2024 0201 by Licha Damon RN  Outcome: Progressing  Goal: Return ADL status to a safe level of function  12/6/2024 0754 by Josette Falcon RN  Outcome: Progressing  12/6/2024 0201 by Licha Damon RN  Outcome: Progressing     Problem: Discharge Planning  Goal: Discharge to home or other facility with appropriate resources  12/6/2024 0754 by Josette Falcon RN  Outcome: Progressing  12/6/2024 0201 by Licha Damon RN  Outcome: Progressing  Flowsheets (Taken 12/5/2024 2115)  Discharge to home or other facility with appropriate resources: Identify barriers to discharge 
  Problem: Safety - Adult  Goal: Free from fall injury  12/6/2024 1842 by Josette Falcon RN  Outcome: Progressing  12/6/2024 0754 by Josette Falcon RN  Outcome: Progressing     Problem: Pain  Goal: Verbalizes/displays adequate comfort level or baseline comfort level  12/6/2024 1842 by Josette Falcon RN  Outcome: Progressing  12/6/2024 0754 by Josette Falcon RN  Outcome: Progressing     Problem: Skin/Tissue Integrity  Goal: Absence of new skin breakdown  Description: 1.  Monitor for areas of redness and/or skin breakdown  2.  Assess vascular access sites hourly  3.  Every 4-6 hours minimum:  Change oxygen saturation probe site  4.  Every 4-6 hours:  If on nasal continuous positive airway pressure, respiratory therapy assess nares and determine need for appliance change or resting period.  12/6/2024 1842 by Josette Falcon RN  Outcome: Progressing  12/6/2024 0754 by Josette Falcon RN  Outcome: Progressing     Problem: Musculoskeletal - Adult  Goal: Return mobility to safest level of function  12/6/2024 1842 by Josette Falcon RN  Outcome: Progressing  12/6/2024 0754 by Josette Falcon RN  Outcome: Progressing  Goal: Maintain proper alignment of affected body part  12/6/2024 1842 by Josette Falcon RN  Outcome: Progressing  12/6/2024 0754 by Josette Falcon RN  Outcome: Progressing  Goal: Return ADL status to a safe level of function  12/6/2024 1842 by Josette Falcon RN  Outcome: Progressing  12/6/2024 0754 by Josette Falcon RN  Outcome: Progressing     Problem: Discharge Planning  Goal: Discharge to home or other facility with appropriate resources  12/6/2024 1842 by Josette Falcon RN  Outcome: Progressing  12/6/2024 0754 by Josette Falcon RN  Outcome: Progressing     Problem: ABCDS Injury Assessment  Goal: Absence of physical injury  12/6/2024 1842 by Josette Faclon RN  Outcome: Progressing  12/6/2024 0754 by Josette Falcon RN  Outcome: Progressing     Problem: Nutrition Deficit:  Goal: Optimize 
  Problem: Safety - Adult  Goal: Free from fall injury  12/6/2024 2331 by Cosmo Cazares RN  Outcome: Progressing  12/6/2024 1842 by Josette Falcon RN  Outcome: Progressing     Problem: Pain  Goal: Verbalizes/displays adequate comfort level or baseline comfort level  12/6/2024 2331 by Cosmo Cazares RN  Outcome: Progressing  12/6/2024 1842 by Josette Falcon RN  Outcome: Progressing     Problem: Skin/Tissue Integrity  Goal: Absence of new skin breakdown  Description: 1.  Monitor for areas of redness and/or skin breakdown  2.  Assess vascular access sites hourly  3.  Every 4-6 hours minimum:  Change oxygen saturation probe site  4.  Every 4-6 hours:  If on nasal continuous positive airway pressure, respiratory therapy assess nares and determine need for appliance change or resting period.  12/6/2024 2331 by Cosmo Cazares RN  Outcome: Progressing  12/6/2024 1842 by Josette Falcon RN  Outcome: Progressing     Problem: Musculoskeletal - Adult  Goal: Return mobility to safest level of function  12/6/2024 2331 by Cosmo Cazares RN  Outcome: Progressing  12/6/2024 1842 by Josette Falcon RN  Outcome: Progressing  Goal: Maintain proper alignment of affected body part  12/6/2024 2331 by Cosmo Cazares RN  Outcome: Progressing  12/6/2024 1842 by Josette Falcon RN  Outcome: Progressing  Goal: Return ADL status to a safe level of function  12/6/2024 2331 by Cosmo Cazares RN  Outcome: Progressing  12/6/2024 1842 by Josette Falcon RN  Outcome: Progressing     Problem: Discharge Planning  Goal: Discharge to home or other facility with appropriate resources  12/6/2024 2331 by Cosmo Cazares RN  Outcome: Progressing  12/6/2024 1842 by Josette Falcon RN  Outcome: Progressing     Problem: ABCDS Injury Assessment  Goal: Absence of physical injury  12/6/2024 2331 by Cosmo Cazares RN  Outcome: Progressing  12/6/2024 1842 by Josette Falcon RN  Outcome: Progressing     Problem: Nutrition Deficit:  Goal: Optimize 
  Problem: Safety - Adult  Goal: Free from fall injury  12/7/2024 1233 by Kierra Castrejon RN  Outcome: Progressing  12/6/2024 2331 by Cosmo Cazares RN  Outcome: Progressing     Problem: Pain  Goal: Verbalizes/displays adequate comfort level or baseline comfort level  12/7/2024 1233 by Kierra Castrejon RN  Outcome: Progressing  12/6/2024 2331 by Cosmo Cazares RN  Outcome: Progressing     
  Problem: Safety - Adult  Goal: Free from fall injury  12/7/2024 2200 by Michaela Lebron RN  Outcome: Progressing  12/7/2024 1233 by Kierra Castrejon RN  Outcome: Progressing     Problem: Pain  Goal: Verbalizes/displays adequate comfort level or baseline comfort level  12/7/2024 2200 by Michaela Lebron RN  Outcome: Progressing  12/7/2024 1233 by Kierra Castrejon RN  Outcome: Progressing     Problem: Skin/Tissue Integrity  Goal: Absence of new skin breakdown  Description: 1.  Monitor for areas of redness and/or skin breakdown  2.  Assess vascular access sites hourly  3.  Every 4-6 hours minimum:  Change oxygen saturation probe site  4.  Every 4-6 hours:  If on nasal continuous positive airway pressure, respiratory therapy assess nares and determine need for appliance change or resting period.  Outcome: Progressing     Problem: Musculoskeletal - Adult  Goal: Return mobility to safest level of function  Outcome: Progressing  Goal: Maintain proper alignment of affected body part  Outcome: Progressing  Goal: Return ADL status to a safe level of function  Outcome: Progressing     Problem: Discharge Planning  Goal: Discharge to home or other facility with appropriate resources  Outcome: Progressing     Problem: ABCDS Injury Assessment  Goal: Absence of physical injury  Outcome: Progressing     Problem: Nutrition Deficit:  Goal: Optimize nutritional status  Outcome: Progressing     
  Problem: Safety - Adult  Goal: Free from fall injury  12/9/2024 1959 by Silvia Johns, RN  Outcome: Progressing     Problem: Pain  Goal: Verbalizes/displays adequate comfort level or baseline comfort level  12/9/2024 1959 by Silvia Johns, RN  Outcome: Progressing     Problem: Skin/Tissue Integrity  Goal: Absence of new skin breakdown  Description: 1.  Monitor for areas of redness and/or skin breakdown  2.  Assess vascular access sites hourly  3.  Every 4-6 hours minimum:  Change oxygen saturation probe site  4.  Every 4-6 hours:  If on nasal continuous positive airway pressure, respiratory therapy assess nares and determine need for appliance change or resting period.  12/9/2024 1959 by Silvia Johns RN  Outcome: Progressing     Problem: Musculoskeletal - Adult  Goal: Return mobility to safest level of function  12/9/2024 1959 by Silvia Johns RN  Outcome: Progressing     Problem: Musculoskeletal - Adult  Goal: Maintain proper alignment of affected body part  12/9/2024 1959 by Silvia Johns RN  Outcome: Progressing    Problem: Musculoskeletal - Adult  Goal: Return ADL status to a safe level of function  12/9/2024 1959 by Silvia Johns RN  Outcome: Progressing     Problem: Discharge Planning  Goal: Discharge to home or other facility with appropriate resources  12/9/2024 1959 by Silvia Johns, RN  Outcome: Progressing     Problem: ABCDS Injury Assessment  Goal: Absence of physical injury  12/9/2024 1959 by Silvia Johns, RN  Outcome: Progressing     Problem: Nutrition Deficit:  Goal: Optimize nutritional status  12/9/2024 1959 by Silvia Johns, RN  Outcome: Progressing        
  Problem: Safety - Adult  Goal: Free from fall injury  Outcome: Progressing     Problem: Pain  Goal: Verbalizes/displays adequate comfort level or baseline comfort level  Outcome: Progressing     Problem: Skin/Tissue Integrity  Goal: Absence of new skin breakdown  Description: 1.  Monitor for areas of redness and/or skin breakdown  2.  Assess vascular access sites hourly  3.  Every 4-6 hours minimum:  Change oxygen saturation probe site  4.  Every 4-6 hours:  If on nasal continuous positive airway pressure, respiratory therapy assess nares and determine need for appliance change or resting period.  Outcome: Progressing     Problem: Musculoskeletal - Adult  Goal: Return mobility to safest level of function  Outcome: Progressing  Goal: Maintain proper alignment of affected body part  Outcome: Progressing  Goal: Return ADL status to a safe level of function  Outcome: Progressing     
  Problem: Safety - Adult  Goal: Free from fall injury  Outcome: Progressing     Problem: Pain  Goal: Verbalizes/displays adequate comfort level or baseline comfort level  Outcome: Progressing     Problem: Skin/Tissue Integrity  Goal: Absence of new skin breakdown  Description: 1.  Monitor for areas of redness and/or skin breakdown  2.  Assess vascular access sites hourly  3.  Every 4-6 hours minimum:  Change oxygen saturation probe site  4.  Every 4-6 hours:  If on nasal continuous positive airway pressure, respiratory therapy assess nares and determine need for appliance change or resting period.  Outcome: Progressing     Problem: Musculoskeletal - Adult  Goal: Return mobility to safest level of function  Outcome: Progressing  Goal: Maintain proper alignment of affected body part  Outcome: Progressing  Goal: Return ADL status to a safe level of function  Outcome: Progressing     Problem: Discharge Planning  Goal: Discharge to home or other facility with appropriate resources  Outcome: Progressing     
  Problem: Safety - Adult  Goal: Free from fall injury  Outcome: Progressing     Problem: Pain  Goal: Verbalizes/displays adequate comfort level or baseline comfort level  Outcome: Progressing     Problem: Skin/Tissue Integrity  Goal: Absence of new skin breakdown  Description: 1.  Monitor for areas of redness and/or skin breakdown  2.  Assess vascular access sites hourly  3.  Every 4-6 hours minimum:  Change oxygen saturation probe site  4.  Every 4-6 hours:  If on nasal continuous positive airway pressure, respiratory therapy assess nares and determine need for appliance change or resting period.  Outcome: Progressing     Problem: Musculoskeletal - Adult  Goal: Return mobility to safest level of function  Outcome: Progressing  Goal: Maintain proper alignment of affected body part  Outcome: Progressing  Goal: Return ADL status to a safe level of function  Outcome: Progressing     Problem: Discharge Planning  Goal: Discharge to home or other facility with appropriate resources  Outcome: Progressing     Problem: ABCDS Injury Assessment  Goal: Absence of physical injury  Outcome: Progressing     
  Problem: Safety - Adult  Goal: Free from fall injury  Outcome: Progressing     Problem: Pain  Goal: Verbalizes/displays adequate comfort level or baseline comfort level  Outcome: Progressing     Problem: Skin/Tissue Integrity  Goal: Absence of new skin breakdown  Description: 1.  Monitor for areas of redness and/or skin breakdown  2.  Assess vascular access sites hourly  3.  Every 4-6 hours minimum:  Change oxygen saturation probe site  4.  Every 4-6 hours:  If on nasal continuous positive airway pressure, respiratory therapy assess nares and determine need for appliance change or resting period.  Outcome: Progressing     Problem: Musculoskeletal - Adult  Goal: Return mobility to safest level of function  Outcome: Progressing  Goal: Maintain proper alignment of affected body part  Outcome: Progressing  Goal: Return ADL status to a safe level of function  Outcome: Progressing     Problem: Discharge Planning  Goal: Discharge to home or other facility with appropriate resources  Outcome: Progressing     Problem: ABCDS Injury Assessment  Goal: Absence of physical injury  Outcome: Progressing     
  Problem: Safety - Adult  Goal: Free from fall injury  Outcome: Progressing     Problem: Pain  Goal: Verbalizes/displays adequate comfort level or baseline comfort level  Outcome: Progressing     Problem: Skin/Tissue Integrity  Goal: Absence of new skin breakdown  Description: 1.  Monitor for areas of redness and/or skin breakdown  2.  Assess vascular access sites hourly  3.  Every 4-6 hours minimum:  Change oxygen saturation probe site  4.  Every 4-6 hours:  If on nasal continuous positive airway pressure, respiratory therapy assess nares and determine need for appliance change or resting period.  Outcome: Progressing     Problem: Musculoskeletal - Adult  Goal: Return mobility to safest level of function  Outcome: Progressing  Goal: Maintain proper alignment of affected body part  Outcome: Progressing  Goal: Return ADL status to a safe level of function  Outcome: Progressing     Problem: Discharge Planning  Goal: Discharge to home or other facility with appropriate resources  Outcome: Progressing     Problem: ABCDS Injury Assessment  Goal: Absence of physical injury  Outcome: Progressing     Problem: Nutrition Deficit:  Goal: Optimize nutritional status  Outcome: Progressing     
  Problem: Safety - Adult  Goal: Free from fall injury  Outcome: Progressing     Problem: Pain  Goal: Verbalizes/displays adequate comfort level or baseline comfort level  Outcome: Progressing     Problem: Skin/Tissue Integrity  Goal: Absence of new skin breakdown  Description: 1.  Monitor for areas of redness and/or skin breakdown  2.  Assess vascular access sites hourly  3.  Every 4-6 hours minimum:  Change oxygen saturation probe site  4.  Every 4-6 hours:  If on nasal continuous positive airway pressure, respiratory therapy assess nares and determine need for appliance change or resting period.  Outcome: Progressing     Problem: Musculoskeletal - Adult  Goal: Return mobility to safest level of function  Outcome: Progressing  Goal: Maintain proper alignment of affected body part  Outcome: Progressing  Goal: Return ADL status to a safe level of function  Outcome: Progressing     Problem: Discharge Planning  Goal: Discharge to home or other facility with appropriate resources  Outcome: Progressing  Flowsheets (Taken 12/5/2024 2115)  Discharge to home or other facility with appropriate resources: Identify barriers to discharge with patient and caregiver     Problem: ABCDS Injury Assessment  Goal: Absence of physical injury  Outcome: Progressing     Problem: Nutrition Deficit:  Goal: Optimize nutritional status  Outcome: Progressing     
  Problem: Safety - Adult  Goal: Free from fall injury  Outcome: Progressing     Problem: Pain  Goal: Verbalizes/displays adequate comfort level or baseline comfort level  Outcome: Progressing     Problem: Skin/Tissue Integrity  Goal: Absence of new skin breakdown  Description: 1.  Monitor for areas of redness and/or skin breakdown  2.  Assess vascular access sites hourly  3.  Every 4-6 hours minimum:  Change oxygen saturation probe site  4.  Every 4-6 hours:  If on nasal continuous positive airway pressure, respiratory therapy assess nares and determine need for appliance change or resting period.  Outcome: Progressing     Problem: Musculoskeletal - Adult  Goal: Return mobility to safest level of function  Outcome: Progressing  Goal: Maintain proper alignment of affected body part  Outcome: Progressing  Goal: Return ADL status to a safe level of function  Outcome: Progressing     Problem: Discharge Planning  Goal: Discharge to home or other facility with appropriate resources  Outcome: Progressing  Flowsheets (Taken 12/8/2024 [id number])  Discharge to home or other facility with appropriate resources: Identify barriers to discharge with patient and caregiver     
  Problem: Nutrition Deficit:  Goal: Optimize nutritional status  12/9/2024 1025 by Nia Marinelli, RN  Outcome: Progressing  12/8/2024 2309 by Silvia Johns, RN  Outcome: Progressing     
Nutrition Deficit:  Goal: Optimize nutritional status  12/5/2024 0833 by Josette Falcon, RN  Outcome: Progressing  12/4/2024 2328 by Marcelle Rivera RN  Outcome: Progressing     
RN  Outcome: Progressing

## 2024-12-11 NOTE — PROGRESS NOTES
CC: Fall from ladder, polytrauma    Assessment:    Fall from ladder  Hospital Problems             Last Modified POA    * (Principal) Open fracture of distal end of ulna (alone) 11/26/2024 Yes    Accidental fall from ladder 11/26/2024 Yes    Multiple trauma 11/26/2024 Yes    Closed displaced fracture of left acetabulum (HCC) 11/26/2024 Yes   Acute blood loss anemia hemoglobin 10.6-stable    Plan:  Multimodal analgesia  ORIF left acetabulum on Monday with Ortho  Monitor CBC  Start Lovenox  Regular diet  Stop IV fluid  Nonweightbearing left lower extremity and left upper extremity    Subjective:  No new complaint    Objective:  General -no acute  Neuro -awake alert attentive  Lungs -nonlabored, room air  Abdomen -mild left lower quadrant tenderness  Ext -left upper extremity splinted.  Fingers pink and warm  Skin -no rash          
 [facility]   Attending Physician Statement:    I personally saw, examined and provided care for the patient. Radiographs, labs and medication list were reviewed by me independently.  The case was discussed in detail and plans for care were established. Review of Residents documentation was conducted and revisions were made as appropriate. I agree with the above documented exam, problem list and plan of care.    CC: Fall from ladder    Subjective:  Patient states that his left thumb numbness has improved.    Objective:  Awake alert x3, GCS 15  No apparent distress  Heart regular rate rhythm  Lungs are clear bilaterally  Abdomen soft nontender nondistended  Extremities-left upper extremity dressed, left lower extremity dressed  A/P:       -I have reviewed the following  labs:  CBC with Differential:    Lab Results   Component Value Date/Time    WBC 8.2 12/09/2024 04:21 AM    RBC 3.24 12/09/2024 04:21 AM    HGB 9.5 12/09/2024 04:21 AM    HCT 28.9 12/09/2024 04:21 AM     12/09/2024 04:21 AM    MCV 89.2 12/09/2024 04:21 AM    MCH 29.3 12/09/2024 04:21 AM    MCHC 32.9 12/09/2024 04:21 AM    RDW 13.0 12/09/2024 04:21 AM    METASPCT 2 12/07/2024 04:21 AM    LYMPHOPCT 17 12/09/2024 04:21 AM    PROMYELOPCT 1 12/08/2024 04:24 AM    MONOPCT 5 12/09/2024 04:21 AM    MYELOPCT 1 12/09/2024 04:21 AM    EOSPCT 3 12/09/2024 04:21 AM    BASOPCT 1 12/09/2024 04:21 AM    MONOSABS 0.43 12/09/2024 04:21 AM    LYMPHSABS 1.35 12/09/2024 04:21 AM    EOSABS 0.21 12/09/2024 04:21 AM    BASOSABS 0.07 12/09/2024 04:21 AM     CMP:    Lab Results   Component Value Date/Time     12/09/2024 04:21 AM    K 4.0 12/09/2024 04:21 AM     12/09/2024 04:21 AM    CO2 26 12/09/2024 04:21 AM    BUN 25 12/09/2024 04:21 AM    CREATININE 0.9 12/09/2024 04:21 AM    LABGLOM >90 12/09/2024 04:21 AM    GLUCOSE 105 12/09/2024 04:21 AM    CALCIUM 9.1 12/09/2024 04:21 AM    BILITOT 0.6 11/26/2024 11:44 AM    ALKPHOS 84 11/26/2024 
 [facility] Surgery   Attending Physician Statement:    I personally saw, examined and provided care for the patient. Radiographs, labs and medication list were reviewed by me independently.  The case was discussed in detail and plans for care were established. Review of Residents documentation was conducted and revisions were made as appropriate. I agree with the above documented exam, problem list and plan of care.    CC: Fall from ladder    Subjective:  Had bowel function yesterday    Objective:  Awake alert x3, GCS 15  No apparent distress  Heart regular rate rhythm  Lungs are clear bilaterally  Abdomen soft nontender nondistended  Extremities-left upper extremity dressed, left lower extremity dressed  A/P:       -I have reviewed the following  labs:  CBC with Differential:    Lab Results   Component Value Date/Time    WBC 6.7 12/11/2024 04:41 AM    RBC 3.47 12/11/2024 04:41 AM    HGB 10.1 12/11/2024 04:41 AM    HCT 31.2 12/11/2024 04:41 AM     12/11/2024 04:41 AM    MCV 89.9 12/11/2024 04:41 AM    MCH 29.1 12/11/2024 04:41 AM    MCHC 32.4 12/11/2024 04:41 AM    RDW 13.5 12/11/2024 04:41 AM    METASPCT 2 12/07/2024 04:21 AM    LYMPHOPCT 13 12/11/2024 04:41 AM    PROMYELOPCT 1 12/08/2024 04:24 AM    MONOPCT 8 12/11/2024 04:41 AM    MYELOPCT 1 12/09/2024 04:21 AM    EOSPCT 1 12/11/2024 04:41 AM    BASOPCT 1 12/11/2024 04:41 AM    MONOSABS 0.55 12/11/2024 04:41 AM    LYMPHSABS 0.89 12/11/2024 04:41 AM    EOSABS 0.05 12/11/2024 04:41 AM    BASOSABS 0.04 12/11/2024 04:41 AM     CMP:    Lab Results   Component Value Date/Time     12/11/2024 04:41 AM    K 3.8 12/11/2024 04:41 AM    CL 99 12/11/2024 04:41 AM    CO2 23 12/11/2024 04:41 AM    BUN 27 12/11/2024 04:41 AM    CREATININE 0.8 12/11/2024 04:41 AM    LABGLOM >90 12/11/2024 04:41 AM    GLUCOSE 95 12/11/2024 04:41 AM    CALCIUM 8.6 12/11/2024 04:41 AM    BILITOT 0.6 11/26/2024 11:44 AM    ALKPHOS 84 11/26/2024 11:44 AM    AST 37 11/26/2024 
 [facility] Surgery   Attending Physician Statement:    I personally saw, examined and provided care for the patient. Radiographs, labs and medication list were reviewed by me independently.  The case was discussed in detail and plans for care were established. Review of Residents documentation was conducted and revisions were made as appropriate. I agree with the above documented exam, problem list and plan of care.    CC: Fall from ladder    Subjective:  Patient is sitting in chair.  He is passing gas    Objective:  Awake alert x3, GCS 15  No apparent distress  Heart regular rate rhythm  Lungs are clear bilaterally  Abdomen soft nontender nondistended  Extremities-left upper extremity dressed, left lower extremity dressed  A/P:       -I have reviewed the following  labs:  CBC with Differential:    Lab Results   Component Value Date/Time    WBC 15.2 12/10/2024 04:32 AM    RBC 3.68 12/10/2024 04:32 AM    HGB 10.8 12/10/2024 04:32 AM    HCT 33.0 12/10/2024 04:32 AM     12/10/2024 04:32 AM    MCV 89.7 12/10/2024 04:32 AM    MCH 29.3 12/10/2024 04:32 AM    MCHC 32.7 12/10/2024 04:32 AM    RDW 13.2 12/10/2024 04:32 AM    METASPCT 2 12/07/2024 04:21 AM    LYMPHOPCT 2 12/10/2024 04:32 AM    PROMYELOPCT 1 12/08/2024 04:24 AM    MONOPCT 3 12/10/2024 04:32 AM    MYELOPCT 1 12/09/2024 04:21 AM    EOSPCT 0 12/10/2024 04:32 AM    BASOPCT 0 12/10/2024 04:32 AM    MONOSABS 0.40 12/10/2024 04:32 AM    LYMPHSABS 0.33 12/10/2024 04:32 AM    EOSABS 0.06 12/10/2024 04:32 AM    BASOSABS 0.02 12/10/2024 04:32 AM     CMP:    Lab Results   Component Value Date/Time     12/10/2024 04:32 AM    K 4.4 12/10/2024 04:32 AM     12/10/2024 04:32 AM    CO2 22 12/10/2024 04:32 AM    BUN 34 12/10/2024 04:32 AM    CREATININE 0.9 12/10/2024 04:32 AM    LABGLOM >90 12/10/2024 04:32 AM    GLUCOSE 118 12/10/2024 04:32 AM    CALCIUM 8.9 12/10/2024 04:32 AM    BILITOT 0.6 11/26/2024 11:44 AM    ALKPHOS 84 11/26/2024 11:44 AM    
4 Eyes Skin Assessment     NAME:  Issac Ramirez  YOB: 1965  MEDICAL RECORD NUMBER:  [id number]    The patient is being assessed for  Admission    I agree that at least one RN has performed a thorough Head to Toe Skin Assessment on the patient. ALL assessment sites listed below have been assessed.      Areas assessed by both nurses:    Head, Face, Ears, Shoulders, Back, Chest, Arms, Elbows, Hands, Sacrum. Buttock, Coccyx, Ischium, Legs. Feet and Heels, and Under Medical Devices         Does the Patient have a Wound? No noted wound(s)       -Injury/trauma left arm, ace wrap    Tray Prevention initiated by RN: Yes  Wound Care Orders initiated by RN: No    Pressure Injury (Stage 3,4, Unstageable, DTI, NWPT, and Complex wounds) if present, place Wound referral order by RN under : No    New Ostomies, if present place, Ostomy referral order under : No     Nurse 1 eSignature: Electronically signed by Julia Larkin RN on 11/26/24 at 4:09 PM EST    **SHARE this note so that the co-signing nurse can place an eSignature**    Nurse 2 eSignature: Electronically signed by Stacey Hassan RN on 11/26/24 at 4:31 PM EST  
Cervical Spine C Collar Clearance -  Patient CT Spine Imaging normal.  Patient does not complain of Cervical Spine tenderness upon palpation.  Patients C-Spine ranged.  C-spine clear, no need for C-Collar.      Kimberlyn Rivas MD  Surgery Resident PGY1  
Comprehensive Nutrition Assessment    Type and Reason for Visit:  Initial, Consult, Wound, LOS    Nutrition Recommendations/Plan:   Continue current diet. Recommend and start ONS: Ensure + BID, monica BID to promote nutrient/PO intake and post op healing.       Nutrition Assessment:    Pt. admitted w/ multiple fx d/t fall from ladder. S/P open reduction total fixation of left distal radius and ulnar fracture on 11/27, ORIF of left acetabular fracture on 12/2, xenograft application to left volar wrist wound on 12/2, lateral collateral ligament repair of left elbow on 12/2, Wound Vac in place. Pt. tolerating diet w/ noted 50-75% intakes at most meals. Will start ONS to promote nutrient/PO intake and post op healing.    Nutrition Related Findings:    A&Ox4, -I/O(11L), flat/soft abd, active BS, constipation, +2 edema, hyperglycemia, Wound Type: Multiple, Surgical Incision, Wound Vac (s/p multiple ORIFs)       Current Nutrition Intake & Therapies:    Average Meal Intake: 51-75%  Average Supplements Intake: None Ordered  ADULT DIET; Regular  ADULT ORAL NUTRITION SUPPLEMENT; Breakfast, Dinner; Wound Healing Oral Supplement  ADULT ORAL NUTRITION SUPPLEMENT; Breakfast, Lunch; Standard High Calorie/High Protein Oral Supplement    Anthropometric Measures:  Height: 170.2 cm (5' 7.01\")  Ideal Body Weight (IBW): 148 lbs (67 kg)       Current Body Weight: 78 kg (171 lb 15.3 oz) (11/26 no method), 116.2 % IBW. Weight Source: Not specified  Current BMI (kg/m2): 26.9  Usual Body Weight:  (UTO d/t lack of wt hx on file)        Weight Adjustment For: No Adjustment                 BMI Categories: Overweight (BMI 25.0-29.9)    Estimated Daily Nutrient Needs:  Energy Requirements Based On: Formula  Weight Used for Energy Requirements: Current  Energy (kcal/day): 1800-2000kcal (MSJx1.2SF)  Weight Used for Protein Requirements: Ideal  Protein (g/day): 95-110g (1.4-1.6g/kgIBW)  Method Used for Fluid Requirements: 1 ml/kcal  Fluid (ml/day): 
Comprehensive Nutrition Assessment    Type and Reason for Visit:  Reassess    Nutrition Recommendations/Plan:   Continue current diet w/ ONS to optimize nutrient/PO intake and post op healing.       Nutrition Assessment:    Pt. admitted w/ multiple fx d/t fall from ladder. S/P open reduction total fixation of left distal radius and ulnar fracture on 11/27, ORIF of left acetabular fracture on 12/2, xenograft application to left volar wrist wound on 12/2, lateral collateral ligament repair of left elbow on 12/2, Wound Vac in place. Pt. tolerating diet w/ noted 50-75% intakes at most meals this admit. Will continue ONS to promote nutrient/PO intake and post op healing.    Nutrition Related Findings:    A&Ox4, -I/O, soft abd, active BS,  +2 edema, elevated BUN Wound Type: Multiple, Surgical Incision, Wound Vac (s/p multiple ORIFs)       Current Nutrition Intake & Therapies:    Average Meal Intake: %, 51-75% (50-75% at most meals; % most recent intake)  Average Supplements Intake: Unable to assess  ADULT ORAL NUTRITION SUPPLEMENT; Breakfast, Dinner; Wound Healing Oral Supplement  ADULT ORAL NUTRITION SUPPLEMENT; Breakfast, Lunch; Standard High Calorie/High Protein Oral Supplement  ADULT DIET; Regular; High Fiber    Anthropometric Measures:  Height: 170.2 cm (5' 7.01\")  Ideal Body Weight (IBW): 148 lbs (67 kg)       Current Body Weight: 78 kg (171 lb 15.3 oz) (11/26 no method), 116.2 % IBW. Weight Source: Not specified  Current BMI (kg/m2): 26.9  Usual Body Weight:  (UTO d/t lack of wt hx on file)        Weight Adjustment For: No Adjustment                 BMI Categories: Overweight (BMI 25.0-29.9)    Estimated Daily Nutrient Needs:  Energy Requirements Based On: Formula  Weight Used for Energy Requirements: Current  Energy (kcal/day): 1800-2000kcal (MSJx1.2SF)  Weight Used for Protein Requirements: Ideal  Protein (g/day): 95-110g (1.4-1.6g/kgIBW)  Method Used for Fluid Requirements: 1 ml/kcal  Fluid (ml/day): 
Department of Orthopedic Surgery  Resident Progress Note    Patient seen and examined at bedside this morning.  He has some worsening pain related to the surgery but overall his pain is well-controlled.  No new numbness or tingling although he continues to have some numbness in the left hand.  No chest pain or difficulty breathing.  No nausea or vomiting.  All questions pertaining to his surgery were sought out and answered at bedside this morning.    VITALS:  /73   Pulse 77   Temp (!) 95 °F (35 °C) (Temporal)   Resp 16   Ht 1.702 m (5' 7\")   Wt 78 kg (172 lb)   SpO2 96%   BMI 26.94 kg/m²     General: alert and oriented to person, place and time    MUSCULOSKELETAL:   left lower extremity:  Surgical dressing in place over the left abdomen  Compartments soft and compressible  +PF/DF/EHL, patient demonstrates activation of quad  +2/4 DP & PT pulses, Brisk Cap refill, Toes warm and perfused  Distal sensation grossly intact to Peroneals, Sural, Saphenous, and tibial nerve distributions    Left upper extremity  -Wound VAC in place and functioning  -Hinged ROM brace in place about the elbow, soft dressing underneath  -Sensation altered primarily over the dorsal and ulnar aspect of the thumb, improved sensation in the ulnar digits  -Flexion and extension demonstrated to all digits, limited secondary to swelling  -Capillary refill intact to all digits, fingers are warm and perfused    CBC:   Lab Results   Component Value Date/Time    WBC 11.3 12/03/2024 05:02 AM    HGB 9.4 12/03/2024 05:02 AM    HCT 28.2 12/03/2024 05:02 AM     12/03/2024 05:02 AM     PT/INR:    Lab Results   Component Value Date/Time    PROTIME 10.2 11/26/2024 11:44 AM    INR 0.9 11/26/2024 11:44 AM           ASSESSMENT  S/P open reduction internal fixation of left acetabular fracture on 12/2/2024, xenograft application to left volar wrist wound on 12/2/2024, lateral collateral ligament repair of left elbow on 12/2/2024, open reduction 
Department of Orthopedic Surgery  Resident Progress Note    Patient seen and examined at bedside this morning.  Pain is well-controlled on current regimen.  He has no new complaints at this time.  Denies any shortness of breath or chest pain.  All questions regarding his left upper extremity surgery were sought and answered at this time.    VITALS:  /60   Pulse 73   Temp 98.5 °F (36.9 °C) (Temporal)   Resp 16   Ht 1.702 m (5' 7\")   Wt 78 kg (172 lb)   SpO2 100%   BMI 26.94 kg/m²     General: alert and oriented to person, place and time, well-developed and well-nourished, in no acute distress    MUSCULOSKELETAL:   left upper extremity:  Splint in place, clean dry and intact  Mild output in wound VAC  Compartments are soft and compressible  Patient able to wiggle fingers through end of the splint  Patient does endorse mild paresthesias within the median nerve distribution of the left hand.  Normal sensation in the radial/ulnar nerve distributions.  +Radial pulse, Brisk Cap refill, hand warm and perfused    left lower extremity:  Compartments are soft and compressible  Patient able to actively plantarflex dorsiflex left ankle, able to extend the great toe  +2/4 DP & PT pulses, Brisk Cap refill, Toes warm and perfused  Distal sensation grossly intact to Peroneals, Sural, Saphenous, and tibial nrs    CBC:   Lab Results   Component Value Date/Time    WBC 7.0 11/29/2024 04:30 AM    HGB 9.4 11/29/2024 04:30 AM    HCT 28.4 11/29/2024 04:30 AM     11/29/2024 04:30 AM     PT/INR:    Lab Results   Component Value Date/Time    PROTIME 10.2 11/26/2024 11:44 AM    INR 0.9 11/26/2024 11:44 AM       ASSESSMENT  S/P irrigation and debridement with ORIF of the left distal radius and ulnar shaft fractures on 11/27  Left anterior, posterior hemitransverse acetabular fracture    PLAN      Continue physical therapy and protocol: Nonweightbearing to left upper extremity, nonweightbearing to left lower extremity  Plan 
Department of Orthopedic Surgery  Resident Progress Note    Patient seen and examined at bedside this morning.  Pain is well-controlled.  No new complaints at this time.  Patient denies any chest pain or shortness of breath.  Patient does endorse continued pain within his pelvis during transfers and movement.  All questions regarding his left upper extremity surgery were sought and answered at this time.    VITALS:  BP (!) 126/56   Pulse 86   Temp 97.6 °F (36.4 °C) (Temporal)   Resp 18   Ht 1.702 m (5' 7\")   Wt 78 kg (172 lb)   SpO2 98%   BMI 26.94 kg/m²     General: alert and oriented to person, place and time, well-developed and well-nourished, in no acute distress    MUSCULOSKELETAL:   left upper extremity:  Splint in place, clean dry and intact  Compartments are soft and compressible  Patient able to wiggle fingers through end of the splint  Patient does endorse mild paresthesias within the median nerve distribution of the left hand.  Normal sensation in the radial/ulnar nerve distributions.  +Radial pulse, Brisk Cap refill, hand warm and perfused    left lower extremity:  Compartments are soft and compressible  Patient able to actively plantarflex dorsiflex left ankle, able to extend the great toe  +2/4 DP & PT pulses, Brisk Cap refill, Toes warm and perfused  Distal sensation grossly intact to Peroneals, Sural, Saphenous, and tibial nrs    CBC:   Lab Results   Component Value Date/Time    WBC 11.0 11/28/2024 04:23 AM    HGB 10.6 11/28/2024 04:23 AM    HCT 32.3 11/28/2024 04:23 AM     11/28/2024 04:23 AM     PT/INR:    Lab Results   Component Value Date/Time    PROTIME 10.2 11/26/2024 11:44 AM    INR 0.9 11/26/2024 11:44 AM       ASSESSMENT  S/P irrigation and debridement with ORIF of the left distal radius and ulnar shaft fractures on 11/27  Left anterior, posterior hemitransverse acetabular fracture    PLAN      Continue physical therapy and protocol: Nonweightbearing to left upper extremity, 
Department of Orthopedic Surgery  Resident Progress Note    Patient seen and examined at bedside this morning.  Patient resting comfortably, pain is well-controlled.  No new numbness or tingling although he does continue to have some numbness in his left thumb.  Denies any chest pain or shortness of breath.  All questions were answered this morning at bedside.      VITALS:  BP (!) 101/56   Pulse 62   Temp 97.3 °F (36.3 °C) (Temporal)   Resp 16   Ht 1.702 m (5' 7\")   Wt 78 kg (172 lb)   SpO2 95%   BMI 26.94 kg/m²     General: alert and oriented to person, place and time    MUSCULOSKELETAL:   left lower extremity:  Surgical dressing in place over the left abdomen  Compartments soft and compressible  +PF/DF/EHL, patient demonstrates activation of quad  +2/4 DP & PT pulses, Brisk Cap refill, Toes warm and perfused  Distal sensation grossly intact to Peroneals, Sural, Saphenous, and tibial nerve distributions    Left upper extremity  -Wound VAC is in place and functioning, no noted drainage was seen back  -Hinged ROM brace in place about the elbow, soft dressing underneath  -Sensation altered primarily over the dorsal and ulnar aspect of the thumb, improved sensation in the ulnar digits  -Flexion and extension demonstrated to all digits, limited secondary to swelling  -Capillary refill intact to all digits, fingers are warm and perfused    CBC:   Lab Results   Component Value Date/Time    WBC 8.7 12/04/2024 04:45 AM    HGB 9.5 12/04/2024 04:45 AM    HCT 27.6 12/04/2024 04:45 AM     12/04/2024 04:45 AM     PT/INR:    Lab Results   Component Value Date/Time    PROTIME 10.2 11/26/2024 11:44 AM    INR 0.9 11/26/2024 11:44 AM           ASSESSMENT  S/P open reduction internal fixation of left acetabular fracture on 12/2/2024, xenograft application to left volar wrist wound on 12/2/2024, lateral collateral ligament repair of left elbow on 12/2/2024, open reduction total fixation of left distal radius and ulnar 
MRI screening form done  
MRI screening form needs completed, thank you.  
Message Dr. Gramajo regarding questions patient has for ortho.  
Messaged Dr. Rivas per patient request about decreasing robaxin dosage.  
Nursing instructor assigned patient to student and accessed chart  
OR called for or nurse to correct LDA Avatar. Wound vac and wound documented on the wrong limb  
Occupational Therapy    PT asked for assistance to attempt functional transitional movements with platform walker. Assisted pt with bed mobility (supine<>sit) and STS with platform walker. Education provided to pt in positioning of LUE to reduce edema and weight bearing status.     HALLE Valdez, OTR/L; [id number]    
Occupational Therapy  OT BEDSIDE TREATMENT NOTE   [facility]  [address], [place]        Date:12/3/2024  Patient Name: Issac Ramirez  MRN: [id number]  : 1965  Room: [address] [place] [address]A     Per OT Eval:    Evaluating OT: Chris Smith OTR/L #[id number]      Referring Provider: Kimberlyn Rivas MD   Specific Provider Orders/Date: OT eval and treat 24     Diagnosis: Open fracture of distal end of ulna (alone) [S52.609B]   Pt admitted to hospital following fall from ladder. L acetabular fx; L distal radius / ulnar fx;   Plan for ORIF of left acetabulum later this week versus early next week.      Surgery / Procedure:  I&D with ORIF of the left distal radius and ulnar shaft fractures on 24      Pertinent Medical History:  has no past medical history on file.      Precautions:  Fall Risk, NWB LUE, NWB LLE (L acetabular fx), L UE wound vac     Assessment of current deficits    [x] Functional mobility          [x]ADLs           [x] Strength                  []Cognition    [x] Functional transfers        [x] IADLs         [x] Safety Awareness   [x]Endurance    [] Fine Coordination                        [x] Balance      [] Vision/perception   []Sensation      []Gross Motor Coordination            [] ROM           [] Delirium                   [] Motor Control      OT PLAN OF CARE   OT POC based on physician orders, patient diagnosis and results of clinical assessment     Frequency/Duration 1-3 days/wk for 2 weeks PRN   Specific OT Treatment Interventions to include:   * Instruction/training on adapted ADL techniques and AE recommendations to increase functional independence within precautions       * Training on energy conservation strategies, correct breathing pattern and techniques to improve independence/tolerance for self-care routine  * Functional transfer/mobility training/DME recommendations for increased independence, safety, and fall 
Occupational Therapy  OT BEDSIDE TREATMENT NOTE   [facility]  [address] [place], [place]        Date:12/10/2024  Patient Name: Issac Ramirez  MRN: [id number]  : 1965  Room: [address] [place] [address]A     Per OT Eval:    Evaluating OT: Chris Smith OTR/L #[id number]      Referring Provider: Kimberlyn Rivas MD   Specific Provider Orders/Date: OT eval and treat 24     Diagnosis: Open fracture of distal end of ulna (alone) [S52.609B]   Pt admitted to hospital following fall from ladder. L acetabular fx; L distal radius / ulnar fx;   Plan for ORIF of left acetabulum later this week versus early next week.      Surgery / Procedure:  I&D with ORIF of the left distal radius and ulnar shaft fractures on 24      Pertinent Medical History:  has no past medical history on file.      Precautions:  Fall Risk, NWB LUE, (L acetabular fx), L UE wound vac ORTHOSTATIC BP  *Per Dr. Daigle : Patient will be toe-touch weightbearing to the left lower extremity. Patient will be nonweightbearing to the left upper extremity through the wrist, can use platform walker for ambulatory restrictions for the lower extremities.      Assessment of current deficits    [x] Functional mobility          [x]ADLs           [x] Strength                  []Cognition    [x] Functional transfers        [x] IADLs         [] Safety Awareness   [x]Endurance    [] Fine Coordination                        [x] Balance      [] Vision/perception   []Sensation      []Gross Motor Coordination            [] ROM           [] Delirium                   [] Motor Control      OT PLAN OF CARE   OT POC based on physician orders, patient diagnosis and results of clinical assessment     Frequency/Duration 1-3 days/wk for 2 weeks PRN   Specific OT Treatment Interventions to include:   * Instruction/training on adapted ADL techniques and AE recommendations to increase functional independence within precautions  
Occupational Therapy  OT BEDSIDE TREATMENT NOTE   [facility]  [address], [place]        Date:2024  Patient Name: Issac Ramirez  MRN: [id number]  : 1965  Room: [address] [place] [address]-A     Per OT Eval:    Evaluating OT: Chris Smith OTR/L #[id number]      Referring Provider: Kimberlyn Rivas MD   Specific Provider Orders/Date: OT eval and treat 24     Diagnosis: Open fracture of distal end of ulna (alone) [S52.609B]   Pt admitted to hospital following fall from ladder. L acetabular fx; L distal radius / ulnar fx;   Plan for ORIF of left acetabulum later this week versus early next week.      Surgery / Procedure:  I&D with ORIF of the left distal radius and ulnar shaft fractures on 24      Pertinent Medical History:  has no past medical history on file.      Precautions:  Fall Risk, NWB LUE, (L acetabular fx), L UE wound vac ORTHOSTATIC BP  *Per Dr. Daigle : Patient will be toe-touch weightbearing to the left lower extremity. Patient will be nonweightbearing to the left upper extremity through the wrist, can use platform walker for ambulatory restrictions for the lower extremities.      Assessment of current deficits    [x] Functional mobility          [x]ADLs           [x] Strength                  []Cognition    [x] Functional transfers        [x] IADLs         [] Safety Awareness   [x]Endurance    [] Fine Coordination                        [x] Balance      [] Vision/perception   []Sensation      []Gross Motor Coordination            [] ROM           [] Delirium                   [] Motor Control      OT PLAN OF CARE   OT POC based on physician orders, patient diagnosis and results of clinical assessment     Frequency/Duration 1-3 days/wk for 2 weeks PRN   Specific OT Treatment Interventions to include:   * Instruction/training on adapted ADL techniques and AE recommendations to increase functional independence within precautions   
Occupational Therapy  Occupational Therapy    Date:2024  Patient Name: Issac Ramirez  MRN: [id number]  : 1965  Room: [address] [place] [address]-A     OT orders received and chart reviewed. OT eval on hold at this time, planned for OR today with orthopedics.   OT will follow and re-attempt eval as appropriate, pending ortho POC, at a later time/date.     Monika Lilly, HALLE, OTR/L [id number]    
Pacu  called Consult for brace and faxed   
Patient seen and examined in preoperative holding.  He is status post irrigation debridement with open reduction internal fixation of the left distal radius and ulna fractures on 11/27.  Patient also has a left anterior posterior hemitransverse acetabular fracture.  Plan is for operative fixation by Ortho trauma for the acetabular fractures on 12/2.  Due to availability was asked by the Ortho trauma team to do a repeat I&D of the left upper extremity.  Patient currently states the pain is controlled.  Endorses mild paresthesias within the median nerve distribution on the left hand otherwise normal sensation in the radial and ulnar nerve distributions.  He is able to flex and extend the fingers through the end of the splint.  Plan is for an irrigation and debridement of the left forearm with exploration of wound and likely wound VAC application.  We did discuss with the patient that pending what his wound looks like he may potentially require some form of grafting, potentially on 12/2 with the trauma team.    Electronically signed by Jamie Maher DO on 12/1/2024 at 7:33 AM   
Patient voided 100mL of clear yellow urine  
Patient wants to wait until after his surgery tomorrow to begin bowel regimen. He denies discomfort at this time.  
Patient was given bath wipes and oral care supplies doesn't want his bed changed and will only let his wife wash him up.  Also doesn't want rolled or on turn schedule  
Physical Therapy    PT order received and medical chart reviewed 12/11. Pt off unit for tests. Will re-attempt at a later time/date. Thank you.    Ravindra Reed, PT, DPT  [id number]       
Physical Therapy    PT order received and medical chart reviewed 12/2. Pt off unit for surgery. Will re-attempt as able. Thank you.    Ravindra Reed, PT, DPT  [id number]       
Physical Therapy  Treatment Note    Name: Issac Ramirez  : 1965  MRN: [id number]      Date of Service: 12/10/2024    Evaluating PT: Ravindra Reed, PT, DPT [id number]      Room #:  8414/8414-A  Diagnosis:  Open fracture of distal end of ulna (alone) [S52.609B]  PMHx/PSHx:   has no past medical history on file.  Procedure/Surgery:    24: I&D with ORIF of the left distal radius and ulnar shaft fractures  24:  LEFT FOREARM IRRIGATION AND DEBRIDEMENT, WOUND VAC EXCHANGE  24: L acetabulum ORIF; I&D L forearm with grafting sanjiv  Precautions:  Fall risk, alarms, zarate, TTWB LLE, L acetabular precautions, NWB LUE through wrist (OK to use platform walker for ambulatory restrictions for the lower extremities - per Dr. Melgar op note on 24), LUE in hinged ROM brace ( degrees), LUE Wound vac    SUBJECTIVE:    Pt lives with wife (currently in [place] but is coming home) in a 2 story house with 3 stair(s) and no rail(s) to enter. Pt ambulated without AD prior to admission.    OBJECTIVE:   Initial Evaluation  Date: 24 Treatment Date: 12/10/24 Short Term/ Long Term   Goals   AM-PAC 6 Clicks     Was pt agreeable to Eval/treatment? Yes Yes    Does pt have pain? L UE and LE pain L UE and LE pain    Bed Mobility  Rolling: NT  Supine to sit: MaxA x2 (partial)  Sit to supine: MaxA x2  Scooting: Dependent x2 to HOB Rolling: NT  Supine to sit: Suzan  Sit to supine: NT  Scooting: Suzan to EOB Rolling: Suzan  Supine to sit: Suzan  Sit to supine: Suzan  Scooting: Suzan   Transfers Sit to stand: NT  Stand to sit: NT  Stand pivot: NT Sit to stand: ModA  Stand to sit: ModA  Stand pivot: Suzan with L platform WW Sit to stand: ModA  Stand to sit: ModA  Stand pivot: ModA with AAD   Ambulation   NT 2 feet to chair with L platform WW with Suzan NA   Stair negotiation: ascended and descended NT NT NA   ROM BUE: Refer to OT note  BLE: WFL NT    Strength BUE: Refer to OT note  BLE: NT NT    Balance Sitting 
Physical Therapy  Treatment Note    Name: Issac Ramirez  : 1965  MRN: [id number]      Date of Service: 2024    Evaluating PT: Ravindra Reed, PT, DPT [id number]      Room #:  8414/8414-A  Diagnosis:  Open fracture of distal end of ulna (alone) [S52.609B]  PMHx/PSHx:   has no past medical history on file.  Procedure/Surgery:    24: I&D with ORIF of the left distal radius and ulnar shaft fractures  24:  LEFT FOREARM IRRIGATION AND DEBRIDEMENT, WOUND VAC EXCHANGE  24: L acetabulum ORIF; I&D L forearm with grafting sanjiv  Precautions:  Fall risk, alarms, zarate, TTWB LLE, L acetabular precautions, NWB LUE through wrist (OK to use platform walker for ambulatory restrictions for the lower extremities - per Dr. Melgar op note on 24), LUE in hinged ROM brace ( degrees), LUE Wound vac    SUBJECTIVE:    Pt lives with wife (currently in [place] but is coming home) in a 2 story house with 3 stair(s) and no rail(s) to enter. Pt ambulated without AD prior to admission.    OBJECTIVE:   Initial Evaluation  Date: 24 Treatment Date: 24 Short Term/ Long Term   Goals   AM-PAC 6 Clicks 7/24 10/24    Was pt agreeable to Eval/treatment? Yes Yes    Does pt have pain? L UE and LE pain L UE and LE pain    Bed Mobility  Rolling: NT  Supine to sit: MaxA x2 (partial)  Sit to supine: MaxA x2  Scooting: Dependent x2 to HOB Rolling: NT  Supine to sit: ModA  Sit to supine: ModA  Scooting: ModA to EOB Rolling: Suzan  Supine to sit: Suzan  Sit to supine: Suzan  Scooting: Suzan   Transfers Sit to stand: NT  Stand to sit: NT  Stand pivot: NT Sit to stand: mod A of 1+min A of 1    Stand to sit: ModA  Stand pivot: ModA with L platform WW Sit to stand: ModA  Stand to sit: ModA  Stand pivot: ModA with AAD   Ambulation   NT NT NA   Stair negotiation: ascended and descended NT NT NA   ROM BUE: Refer to OT note  BLE: WFL     Strength BUE: Refer to OT note  BLE: NT     Balance Sitting EOB: NT  Dynamic Standing: 
Physical Therapy  Treatment Note    Name: Issac Ramirez  : 1965  MRN: [id number]      Date of Service: 2024    Evaluating PT: Ravindra Reed, PT, DPT [id number]      Room #:  8414/8414-A  Diagnosis:  Open fracture of distal end of ulna (alone) [S52.609B]  PMHx/PSHx:   has no past medical history on file.  Procedure/Surgery:    24: I&D with ORIF of the left distal radius and ulnar shaft fractures  24:  LEFT FOREARM IRRIGATION AND DEBRIDEMENT, WOUND VAC EXCHANGE  24: L acetabulum ORIF; I&D L forearm with grafting sanjiv  Precautions:  Fall risk, alarms, zarate, TTWB LLE, L acetabular precautions, NWB LUE through wrist (OK to use platform walker for ambulatory restrictions for the lower extremities - per Dr. Melgar op note on 24), LUE in hinged ROM brace ( degrees), LUE Wound vac    SUBJECTIVE:    Pt lives with wife (currently in [place] but is coming home) in a 2 story house with 3 stair(s) and no rail(s) to enter. Pt ambulated without AD prior to admission.    OBJECTIVE:   Initial Evaluation  Date: 24 Treatment Date: 25 Short Term/ Long Term   Goals   AM-PAC 6 Clicks 7/24 10/24    Was pt agreeable to Eval/treatment? Yes Yes    Does pt have pain? L UE and LE pain L UE and LE pain    Bed Mobility  Rolling: NT  Supine to sit: MaxA x2 (partial)  Sit to supine: MaxA x2  Scooting: Dependent x2 to HOB Rolling: NT  Supine to sit: ModA  Sit to supine: ModA  Scooting: ModA to EOB Rolling: Suzan  Supine to sit: Suzan  Sit to supine: Suzan  Scooting: Suzan   Transfers Sit to stand: NT  Stand to sit: NT  Stand pivot: NT Sit to stand: MaxA  Stand to sit: ModA  Stand pivot: ModA with L platform WW Sit to stand: ModA  Stand to sit: ModA  Stand pivot: ModA with AAD   Ambulation   NT 2 feet to/from chair with L platform WW with ModA NA   Stair negotiation: ascended and descended NT NT NA   ROM BUE: Refer to OT note  BLE: WFL     Strength BUE: Refer to OT note  BLE: NT     Balance Sitting 
Physical Therapy  Treatment Note    Name: Issac Ramirez  : 1965  MRN: [id number]      Date of Service: 12/3/2024    Evaluating PT: Ravindra Reed, PT, DPT [id number]      Room #:  8414/8414-A  Diagnosis:  Open fracture of distal end of ulna (alone) [S52.609B]  PMHx/PSHx:   has no past medical history on file.  Procedure/Surgery:    24: I&D with ORIF of the left distal radius and ulnar shaft fractures  24:  LEFT FOREARM IRRIGATION AND DEBRIDEMENT, WOUND VAC EXCHANGE  24: L acetabulum ORIF; I&D L forearm with grafting sanjiv  Precautions:  Fall risk, alarms, zarate, TTWB LLE, L acetabular precautions, NWB LUE through wrist (OK to use platform walker for ambulatory restrictions for the lower extremities - per Dr. Melgar op note on 24), LUE in hinged ROM brace ( degrees), LUE Wound vac    SUBJECTIVE:    Pt lives with wife (currently in [place] but is coming home) in a 2 story house with 3 stair(s) and no rail(s) to enter. Pt ambulated without AD prior to admission.    OBJECTIVE:   Initial Evaluation  Date: 24 Treatment Date: 12/3/24 Short Term/ Long Term   Goals   AM-PAC 6 Clicks     Was pt agreeable to Eval/treatment? Yes Yes    Does pt have pain? L UE and LE pain LUE \"burning\"    Bed Mobility  Rolling: NT  Supine to sit: MaxA x2 (partial)  Sit to supine: MaxA x2  Scooting: Dependent x2 to HOB Rolling: MaxA to R  Supine to sit: MaxA x2  Sit to supine: NT  Scooting: ModA to EOB Rolling: Suzan  Supine to sit: Suzan  Sit to supine: Suzan  Scooting: Suzan   Transfers Sit to stand: NT  Stand to sit: NT  Stand pivot: NT Sit to stand: ModA to platform walker  Stand to sit: ModA  Stand pivot: NT Sit to stand: ModA  Stand to sit: ModA  Stand pivot: ModA with AAD   Ambulation   NT NT NA   Stair negotiation: ascended and descended NT NT NA   ROM BUE: Refer to OT note  BLE: WFL     Strength BUE: Refer to OT note  BLE: NT     Balance Sitting EOB: NT  Dynamic Standing: NT Sitting EOB: 
Post op x rays completed bedside in PACU   
Procedure consent updated per verbal order by Dr. Daigle.      
Procedure consent was signed by daughterRee with patient's approval.  
Pt verified using 2 Identifiers and ID band with OR staff prior to acceptance to PACU/Phase II care.   
RN offered wedges to assist pt with turning and educated pt on importance of turning to prevent skin breakdown and pressure injuries. Pt stated he understood but refused wedges or assistance with turns.   
SROC aware of critical lactic acid.   
Spiritual Health History and Assessment/Progress Note  [facility] Jillian Refugio    (P) Initial Encounter, Spiritual/Emotional Needs,  ,  ,      Name: Issac Ramirez MRN: [id number]    Age: 59 y.o.     Sex: male   Language: English   Jainism: Unknown   Open fracture of distal end of ulna (alone)     Date: 12/5/2024                           Spiritual Assessment began in [facility]        Referral/Consult From: (P) Rounding   Encounter Overview/Reason: (P) Initial Encounter, Spiritual/Emotional Needs  Service Provided For: (P) Patient and family together    Mari, Belief, Meaning:   Patient identifies as spiritual  Family/Friends identify as spiritual      Importance and Influence:  Patient has spiritual/personal beliefs that influence decisions regarding their health  Family/Friends have spiritual/personal beliefs that influence decisions regarding the patient's health    Community:  Patient is connected with a spiritual community  Family/Friends are connected with a spiritual community:    Assessment and Plan of Care:     Patient Interventions include: Facilitated expression of thoughts and feelings  Family/Friends Interventions include: Facilitated expression of thoughts and feelings    Patient Plan of Care: No spiritual needs identified for follow-up  Family/Friends Plan of Care: No spiritual needs identified for follow-up    Electronically signed by Chaplain RITIKA on 12/5/2024 at 7:50 PM    
TRAUMA SURGERY  DAILY PROGRESS NOTE  11/29/2024    CHIEF COMPLAINT:  Chief Complaint   Patient presents with    Trauma     12 ft from ladder open arm fx no pulse in arm        SUBJECTIVE:  No issues overnight. Pain well controlled. Tolerating diet. No BM.    OBJECTIVE:  /60   Pulse 73   Temp 98.5 °F (36.9 °C) (Temporal)   Resp 16   Ht 1.702 m (5' 7\")   Wt 78 kg (172 lb)   SpO2 100%   BMI 26.94 kg/m²     GENERAL:  NAD. A&Ox3.  LUNGS:  No increased work of breathing.  CARDIOVASCULAR: RR  ABDOMEN:  Soft, non-distended, non-tender. No guarding, rigidity, rebound.  Extremities: LUE with surgical dressing in place    ASSESSMENT/PLAN:  59 y.o. male with left open distal ulnar/radius fracture and L acetabulum/iliac wing fracture after fall from ladder    Plan:  Multimodal analgesia - tylenol, oxy, dilaudid, robaxin  Ortho following, planning for ORIF of acetabulum on 12/2  NWB BLLE, NWB LUE  Regular diet  DVT ppx: Lovenox, PCDs  PT/OT as able  Dispo planning    Kimberlyn Rivas MD  Surgery Resident PGY-1  11/29/2024  6:03 AM   
TRAUMA SURGERY  DAILY PROGRESS NOTE  11/30/2024    CHIEF COMPLAINT:  Chief Complaint   Patient presents with    Trauma     12 ft from ladder open arm fx no pulse in arm        SUBJECTIVE:  Pain is well-controlled.  Tolerating diet.  He had 1 BM. Good UOP.     OBJECTIVE:  /70   Pulse 70   Temp 97.1 °F (36.2 °C) (Temporal)   Resp 16   Ht 1.702 m (5' 7\")   Wt 78 kg (172 lb)   SpO2 95%   BMI 26.94 kg/m²     GENERAL:  NAD. A&Ox3.  LUNGS:  No increased work of breathing.  CARDIOVASCULAR: RR  ABDOMEN:  Soft, non-distended, non-tender. No guarding, rigidity, rebound.  Extremities: LUE with surgical dressing in place    ASSESSMENT/PLAN:  59 y.o. male with left open distal ulnar/radius fracture and L acetabulum/iliac wing fracture after fall from ladder    Plan:  Multimodal analgesia - tylenol, oxy, dilaudid, robaxin  Ortho following, planning for possible wound closure of left forearm on 12/1/2024 and ORIF of acetabulum on 12/2 a  NWB BLLE, NWB LUE  Regular diet  Will increase bowel regimen  DVT ppx: Lovenox, PCDs  PT/OT 7/24  Dispo: [facility] Cunningham MD  Surgery Resident PGY-1  11/30/2024  8:03 AM   
TRAUMA SURGERY  DAILY PROGRESS NOTE  12/1/2024    CHIEF COMPLAINT:  Chief Complaint   Patient presents with    Trauma     12 ft from ladder open arm fx no pulse in arm        SUBJECTIVE:  Reports needs to have a bowel movement. Pain well controlled.     OBJECTIVE:  /64   Pulse 76   Temp 97.5 °F (36.4 °C) (Temporal)   Resp 18   Ht 1.702 m (5' 7\")   Wt 78 kg (172 lb)   SpO2 98%   BMI 26.94 kg/m²     GENERAL:  NAD. A&Ox3.  LUNGS:  No increased work of breathing.  CARDIOVASCULAR: RR  ABDOMEN:  Soft, non-distended, non-tender. No guarding, rigidity, rebound.  Extremities: LUE wrapped.     ASSESSMENT/PLAN:  59 y.o. male with left open distal ulnar/radius fracture and L acetabulum/iliac wing fracture after fall from ladder    Plan:  Multimodal analgesia - tylenol, oxy, dilaudid, robaxin  Ortho following, planning for possible wound closure of left forearm on today, 12/1/2024 and ORIF of acetabulum on 12/2.  NWB SANCHO, NWB XANDER  NPO for procedure today, ok for diet afterwards.  Will increase bowel regimen  DVT ppx: Lovenox, PCDs  PT/OT 7/24  Dispo: [facility] Mullins MD  Surgery Resident PGY-4  12/1/2024  7:21 AM   
TRAUMA SURGERY  DAILY PROGRESS NOTE  12/10/2024    CHIEF COMPLAINT:  Chief Complaint   Patient presents with    Trauma     12 ft from ladder open arm fx no pulse in arm        SUBJECTIVE:  Patient seen and evaluated at bedside stating he had difficulty sleeping last night and felt as though he had night sweats, chills, increased thirst and decreased urine output overnight. He remains afebrile and hemodynamically stable. Labs have been within normal limits.     OBJECTIVE:  /67   Pulse 72   Temp 97.4 °F (36.3 °C) (Temporal)   Resp 18   Ht 1.702 m (5' 7.01\")   Wt 78 kg (172 lb)   SpO2 97%   BMI 26.93 kg/m²     GENERAL:  NAD. A&Ox3.  LUNGS:  No increased work of breathing. Lungs clear bilaterally without wheezing, rhonchi or crackles.   CARDIOVASCULAR: RR  ABDOMEN:  Soft, non-distended, non-tender. No guarding, rigidity, rebound. Surgical dressing in place over left abdomen  Extremities: LUE bandaged with hinged elbow ROM brace wound vac to LUE in place and functioning. Decreased sensation in left thumb with limited ROM.     ASSESSMENT/PLAN:  59 y.o. male with left open distal ulnar/radius fracture s/p ORIF and debridement and L acetabulum/iliac wing fracture after fall from ladder.     Plan:  - Multimodal analgesia - tylenol, oxy, dilaudid, robaxin  - Ortho following, has had ORIF of left distal radius and ulna 11/27, lateral collateral ligament repair of left elbow 12/2, xenograft application to left volar wrist 12/2, and ORIF of left acetabulum 12/2. Recommending toe touch weightbearing LLE, NWB LLE, platform walker, hinged elbow ROM brace, lovenox, 24 h ancef post op, follow up in 1 week for repeat xrays and suture removal   -Orthopedic surgery following  - Continue regular diet, high-fiber  - Continue bowel regimen, gylcolax, senna, lactulose and suppositories prn  - Encourage incentive spirometry use   - DVT ppx: Lovenox, PCDs  - PT/OT 10/24  - Dispo: [facility], pending [facility]     Maddie LYNN 
TRAUMA SURGERY  DAILY PROGRESS NOTE  12/11/2024    CHIEF COMPLAINT:  Chief Complaint   Patient presents with    Trauma     12 ft from ladder open arm fx no pulse in arm        SUBJECTIVE:  Patient seen and evaluated at bedside stating he feels better today and has no complaints. His wife initially wanted him to go to [facility] and precert started but now she has decided to pursue [employer] and [employer], which will likely delay his discharge. Yesterday WBC increased to 15.2 and bilateral lower extremity doppler ultrasound was ordered and still pending. Today WBC 6.7.     OBJECTIVE:  /60   Pulse 87   Temp 97.5 °F (36.4 °C) (Temporal)   Resp 16   Ht 1.702 m (5' 7.01\")   Wt 78 kg (172 lb)   SpO2 95%   BMI 26.93 kg/m²     GENERAL:  NAD. A&Ox3.  LUNGS:  No increased work of breathing. Lungs clear bilaterally without wheezing, rhonchi or crackles.   CARDIOVASCULAR: RR  ABDOMEN:  Soft, non-distended, non-tender. No guarding, rigidity, rebound. Surgical dressing in place over left abdomen  Extremities: LUE bandaged with hinged elbow ROM brace wound vac to LUE in place and functioning. Decreased sensation in left thumb with limited ROM.     ASSESSMENT/PLAN:  59 y.o. male with left open distal ulnar/radius fracture s/p ORIF and debridement and L acetabulum/iliac wing fracture after fall from ladder.     Plan:  - Multimodal analgesia - tylenol, oxy, dilaudid, robaxin  - Ortho following, has had ORIF of left distal radius and ulna 11/27, lateral collateral ligament repair of left elbow 12/2, xenograft application to left volar wrist 12/2, and ORIF of left acetabulum 12/2. Recommending toe touch weightbearing LLE, NWB LLE, platform walker, hinged elbow ROM brace, lovenox, 24 h ancef post op, follow up in 1 week for repeat xrays and suture removal   -Orthopedic surgery following  - Continue regular diet, high-fiber  - Continue bowel regimen, gylcolax, senna, lactulose and suppositories prn  - Encourage 
TRAUMA SURGERY  DAILY PROGRESS NOTE  12/2/2024    CHIEF COMPLAINT:  Chief Complaint   Patient presents with    Trauma     12 ft from ladder open arm fx no pulse in arm        SUBJECTIVE:  Went to OR yesterday for repeat debridement of L wrist. No issues post-operatively. Pain controlled. No BM yet.    OBJECTIVE:  /88   Pulse 86   Temp 97.1 °F (36.2 °C) (Temporal)   Resp 16   Ht 1.702 m (5' 7\")   Wt 78 kg (172 lb)   SpO2 95%   BMI 26.94 kg/m²     GENERAL:  NAD. A&Ox3.  LUNGS:  No increased work of breathing.  CARDIOVASCULAR: RR  ABDOMEN:  Soft, non-distended, non-tender. No guarding, rigidity, rebound.  Extremities: LUE wrapped.     ASSESSMENT/PLAN:  59 y.o. male with left open distal ulnar/radius fracture s/p ORIF and debridement and L acetabulum/iliac wing fracture after fall from ladder    Plan:  Multimodal analgesia - tylenol, oxy, dilaudid, robaxin  Ortho following, planning for ORIF of acetabulum today 12/2  NWB BLLE, NWB LUE  NPO for procedure today, ok for diet afterwards.  Continue bowel regimen  DVT ppx: Lovenox, PCDs  PT/OT 7/24  Dispo: [facility] Rivas MD  Surgery Resident PGY-1  12/2/2024  6:06 AM     Attending Attestation     I have seen and examined this patient.  I have personally reviewed and interpreted all relevant labs and imaging.  I agree with the resident documentation.    60 yo man with left open distal ulnar/radius fracture and L acetabulum/iliac wing fracture after fall from ladder     /68   Pulse 73   Temp 97.1 °F (36.2 °C) (Temporal)   Resp 16   Ht 1.702 m (5' 7\")   Wt 78 kg (172 lb)   SpO2 94%   BMI 26.94 kg/m²     Injuries/Active problems   ++Left open distal ulnar/radius fracture   - Possible washout today   ++Left acetabulum/iliac wing fracture  - OR today with Ortho     Nutrition- NPO for surgery     I am managing IV/Enteral/PO Pain medications - yes     Bowel regimen: Glycolax and senna   DVT Ppx: SCD and Lovenox   GI Ppx: Not indicated     CBC: 
TRAUMA SURGERY  DAILY PROGRESS NOTE  12/3/2024    CHIEF COMPLAINT:  Chief Complaint   Patient presents with    Trauma     12 ft from ladder open arm fx no pulse in arm        SUBJECTIVE:  Patient seen and evaluated at bedside stating his pain is well controlled. Underwent left acetabular ORIF, irrigation and debridement of the left forearm with skin grafting, and left lateral collateral ligament and capsule reconstruction of the elbow yesterday.     OBJECTIVE:  /73   Pulse 77   Temp (!) 95 °F (35 °C) (Temporal)   Resp 16   Ht 1.702 m (5' 7\")   Wt 78 kg (172 lb)   SpO2 96%   BMI 26.94 kg/m²     GENERAL:  NAD. A&Ox3.  LUNGS:  No increased work of breathing.  CARDIOVASCULAR: RR  ABDOMEN:  Soft, non-distended, non-tender. No guarding, rigidity, rebound. Surgical dressing in place over left abdomen  Extremities: LUE bandaged with hinged elbow ROM brace locked at  degrees, wound vac to LUE in place and functioning     ASSESSMENT/PLAN:  59 y.o. male with left open distal ulnar/radius fracture s/p ORIF and debridement and L acetabulum/iliac wing fracture after fall from ladder    Plan:  - Multimodal analgesia - tylenol, oxy, dilaudid, robaxin  - Ortho following, has had ORIF of left distal radius and ulna 11/27, lateral collateral ligament repair of left elbow 12/2, xenograft application to left volar wrist 12/2, and ORIF of left acetabulum 12/2. Recommending toe touch weightbearing LLE, NWB LLE, platform walker, hinged elbow ROM bace  degrees, lovenox, 24 h ancef post op, follow up in 1 week for repeat xrays and suture removal   - Continue regular diet   - Continue bowel regimen  - DVT ppx: Lovenox, PCDs  - PT/OT 7/24, therapy to re-evaluate pos-op   - Dispo: HORTENSIA Lewis, DO  Surgery/Transitional year Resident PGY-1  12/3/2024  7:23 AM   
TRAUMA SURGERY  DAILY PROGRESS NOTE  12/4/2024    CHIEF COMPLAINT:  Chief Complaint   Patient presents with    Trauma     12 ft from ladder open arm fx no pulse in arm        SUBJECTIVE:  Patient seen and evaluated at bedside in no acute distress. States he is still having left upper extremity numbness and swelling in his hand and fingers. Was given one dose of toradol yesterday as patient trying to avoid narcotics. Discussed risk of bleed if taking NSAIDs. Worked with therapy yesterday and got 9/24. SMI 2000.     OBJECTIVE:  BP (!) 101/56   Pulse 62   Temp 97.3 °F (36.3 °C) (Temporal)   Resp 16   Ht 1.702 m (5' 7\")   Wt 78 kg (172 lb)   SpO2 95%   BMI 26.94 kg/m²     GENERAL:  NAD. A&Ox3.  LUNGS:  No increased work of breathing.  CARDIOVASCULAR: RR  ABDOMEN:  Soft, non-distended, non-tender. No guarding, rigidity, rebound. Surgical dressing in place over left abdomen  Extremities: LUE bandaged with hinged elbow ROM brace locked at  degrees, wound vac to LUE in place and functioning     ASSESSMENT/PLAN:  59 y.o. male with left open distal ulnar/radius fracture s/p ORIF and debridement and L acetabulum/iliac wing fracture after fall from ladder    Plan:  - Multimodal analgesia - tylenol, oxy, dilaudid, robaxin  - Ortho following, has had ORIF of left distal radius and ulna 11/27, lateral collateral ligament repair of left elbow 12/2, xenograft application to left volar wrist 12/2, and ORIF of left acetabulum 12/2. Recommending toe touch weightbearing LLE, NWB LLE, platform walker, hinged elbow ROM bace  degrees, lovenox, 24 h ancef post op, follow up in 1 week for repeat xrays and suture removal   - Ortho to address wound vac on LUE upon discharge   - Continue regular diet   - Continue bowel regimen  - DVT ppx: Lovenox, PCDs  - PT/OT 9/24  - Dispo: HORTENSIA Lewis, DO  Surgery/Transitional year Resident PGY-1  12/4/2024  8:07 AM   
TRAUMA SURGERY  DAILY PROGRESS NOTE  12/5/2024    CHIEF COMPLAINT:  Chief Complaint   Patient presents with    Trauma     12 ft from ladder open arm fx no pulse in arm        SUBJECTIVE:  Patient seen and evaluated at bedside in no acute distress. States he is starting to feel sick with some congestion, runny nose and sore throat, asking for Sambucol and throat lozenges. Overnight zarate cathter removed and patient voided 100 ml of clear yellow urine. Still has not had a bowel movement. SMI 2000.    OBJECTIVE:  /77   Pulse 75   Temp 98.6 °F (37 °C) (Oral)   Resp 16   Ht 1.702 m (5' 7.01\")   Wt 78 kg (172 lb)   SpO2 95%   BMI 26.93 kg/m²     GENERAL:  NAD. A&Ox3.  LUNGS:  No increased work of breathing.  CARDIOVASCULAR: RR  ABDOMEN:  Soft, non-distended, non-tender. No guarding, rigidity, rebound. Surgical dressing in place over left abdomen  Extremities: LUE bandaged with hinged elbow ROM brace locked at  degrees, wound vac to LUE in place and functioning     ASSESSMENT/PLAN:  59 y.o. male with left open distal ulnar/radius fracture s/p ORIF and debridement and L acetabulum/iliac wing fracture after fall from ladder    Plan:  - Multimodal analgesia - tylenol, oxy, dilaudid, robaxin  - Ortho following, has had ORIF of left distal radius and ulna 11/27, lateral collateral ligament repair of left elbow 12/2, xenograft application to left volar wrist 12/2, and ORIF of left acetabulum 12/2. Recommending toe touch weightbearing LLE, NWB LLE, platform walker, hinged elbow ROM bace  degrees, lovenox, 24 h ancef post op, follow up in 1 week for repeat xrays and suture removal   - Ortho to address wound vac on LUE upon discharge   - Continue regular diet, increase fiber   - Continue bowel regimen, added lactulose and glycolax   - Encourage incentive spirometry use   - Throat lozenges    - DVT ppx: Lovenox, PCDs  - PT/OT 9/24  - Dispo: HORTENSIA Lewis, DO  Surgery/Transitional year Resident 
TRAUMA SURGERY  DAILY PROGRESS NOTE  12/6/2024    CHIEF COMPLAINT:  Chief Complaint   Patient presents with    Trauma     12 ft from ladder open arm fx no pulse in arm        SUBJECTIVE:  No issues overnight. Pain well controlled. Still has not had a BM.    OBJECTIVE:  /78   Pulse 69   Temp 98.6 °F (37 °C) (Temporal)   Resp 16   Ht 1.702 m (5' 7.01\")   Wt 78 kg (172 lb)   SpO2 95%   BMI 26.93 kg/m²     GENERAL:  NAD. A&Ox3.  LUNGS:  No increased work of breathing.  CARDIOVASCULAR: RR  ABDOMEN:  Soft, non-distended, non-tender. No guarding, rigidity, rebound. Surgical dressing in place over left abdomen  Extremities: LUE bandaged with hinged elbow ROM brace locked at  degrees, wound vac to LUE in place and functioning     ASSESSMENT/PLAN:  59 y.o. male with left open distal ulnar/radius fracture s/p ORIF and debridement and L acetabulum/iliac wing fracture after fall from ladder    Plan:  - Multimodal analgesia - tylenol, oxy, dilaudid, robaxin  - Ortho following, has had ORIF of left distal radius and ulna 11/27, lateral collateral ligament repair of left elbow 12/2, xenograft application to left volar wrist 12/2, and ORIF of left acetabulum 12/2. Recommending toe touch weightbearing LLE, NWB LLE, platform walker, hinged elbow ROM bace  degrees, lovenox, 24 h ancef post op, follow up in 1 week for repeat xrays and suture removal   - Ortho to address wound vac on LUE upon discharge   - Continue regular diet, increase fiber   - Continue bowel regimen, gylcolax, senna, lactulose, suppositories  - Encourage incentive spirometry use   - Throat lozenges    - DVT ppx: Lovenox, PCDs  - PT/OT 10/24  - Dispo: HORTENSIA Rivas MD  Surgery Resident PGY-1  12/6/2024  7:27 AM     Attending Attestation      I have seen and examined this patient.  I have personally reviewed and interpreted all relevant labs and imaging.  I agree with the resident documentation.     58 yo man with left open distal 
TRAUMA SURGERY  DAILY PROGRESS NOTE  12/8/2024    CHIEF COMPLAINT:  Chief Complaint   Patient presents with    Trauma     12 ft from ladder open arm fx no pulse in arm        SUBJECTIVE:  No issues overnight. Pain well controlled. Having some numbness in his left thumb.     OBJECTIVE:  BP (!) 113/59   Pulse 69   Temp 97.3 °F (36.3 °C) (Temporal)   Resp 18   Ht 1.702 m (5' 7.01\")   Wt 78 kg (172 lb)   SpO2 96%   BMI 26.93 kg/m²     GENERAL:  NAD. A&Ox3.  LUNGS:  No increased work of breathing.  CARDIOVASCULAR: RR  ABDOMEN:  Soft, non-distended, non-tender. No guarding, rigidity, rebound. Surgical dressing in place over left abdomen  Extremities: LUE bandaged with hinged elbow ROM brace wound vac to LUE in place and functioning. Decreased sensation in left thumb with limited ROM.     ASSESSMENT/PLAN:  59 y.o. male with left open distal ulnar/radius fracture s/p ORIF and debridement and L acetabulum/iliac wing fracture after fall from ladder.     Plan:  - Multimodal analgesia - tylenol, oxy, dilaudid, robaxin  - Ortho following, has had ORIF of left distal radius and ulna 11/27, lateral collateral ligament repair of left elbow 12/2, xenograft application to left volar wrist 12/2, and ORIF of left acetabulum 12/2. Recommending toe touch weightbearing LLE, NWB LLE, platform walker, hinged elbow ROM brace, lovenox, 24 h ancef post op, follow up in 1 week for repeat xrays and suture removal   -Orthopedic surgery following  - Continue regular diet, high-fiber  - Continue bowel regimen, gylcolax, senna, lactulose and suppositories prn  - Encourage incentive spirometry use   - Throat lozenges    - DVT ppx: Lovenox, PCDs  - PT/OT 10/24  - Dispo: HORTENSIA Rivas MD  Surgery Resident PGY-1  12/8/2024  7:56 AM     Attending Attestation      I have seen and examined this patient.  I have personally reviewed and interpreted all relevant labs and imaging.  I agree with the resident documentation.     60 yo man with 
TRAUMA SURGERY  DAILY PROGRESS NOTE  12/9/2024    CHIEF COMPLAINT:  Chief Complaint   Patient presents with    Trauma     12 ft from ladder open arm fx no pulse in arm        SUBJECTIVE:  Patient seen and evaluated at bedside stating his pain is controlled and a 4/10. Still has left thumb numbness and also has left lateral thigh numbness. Otherwise doing well and is pending placement. Last bowel movement on Friday (12/6).    OBJECTIVE:  /65   Pulse 63   Temp 98.8 °F (37.1 °C) (Oral)   Resp 17   Ht 1.702 m (5' 7.01\")   Wt 78 kg (172 lb)   SpO2 97%   BMI 26.93 kg/m²     GENERAL:  NAD. A&Ox3.  LUNGS:  No increased work of breathing.  CARDIOVASCULAR: RR  ABDOMEN:  Soft, non-distended, non-tender. No guarding, rigidity, rebound. Surgical dressing in place over left abdomen  Extremities: LUE bandaged with hinged elbow ROM brace wound vac to LUE in place and functioning. Decreased sensation in left thumb with limited ROM.     ASSESSMENT/PLAN:  59 y.o. male with left open distal ulnar/radius fracture s/p ORIF and debridement and L acetabulum/iliac wing fracture after fall from ladder.     Plan:  - Multimodal analgesia - tylenol, oxy, dilaudid, robaxin  - Ortho following, has had ORIF of left distal radius and ulna 11/27, lateral collateral ligament repair of left elbow 12/2, xenograft application to left volar wrist 12/2, and ORIF of left acetabulum 12/2. Recommending toe touch weightbearing LLE, NWB LLE, platform walker, hinged elbow ROM brace, lovenox, 24 h ancef post op, follow up in 1 week for repeat xrays and suture removal   -Orthopedic surgery following  - Continue regular diet, high-fiber  - Continue bowel regimen, gylcolax, senna, lactulose and suppositories prn  - Encourage incentive spirometry use   - Throat lozenges    - DVT ppx: Lovenox, PCDs  - PT/OT 10/24  - Dispo: HORTENSIA Lewis,   Surgery/Transitional Year Resident PGY-1  12/9/2024  5:40 AM   
This was a simple check in.    
Trauma Tertiary Survey    Admit Date: 11/26/2024  Hospital day 0    CC:    Chief Complaint   Patient presents with    Trauma     12 ft from ladder open arm fx no pulse in arm      Alcohol pre-screening:  Men: How many times in the past year have you had 5 or more drinks in a day?  none  How much do you drink on a daily basis? Social drinker    Drug Pre-screening:    How many times in the past year have you used a recreational drug or used a prescription medication for non medical reasons?  none    Mood Prescreening:    During the past two weeks, have you been bothered by little interest or pleasure doing things?  No  During the past two weeks, have you been bothered by feeling down, depressed or hopeless?  No      Scheduled Meds:   sodium chloride flush  10 mL IntraVENous 2 times per day    methocarbamol  1,000 mg Oral 4x Daily    lactated ringers  1,000 mL IntraVENous Once    acetaminophen  650 mg Oral Q6H    melatonin  5 mg Oral Nightly     Continuous Infusions:   sodium chloride      lactated ringers 100 mL/hr at 11/26/24 1549    sodium chloride       PRN Meds:sodium chloride, sodium chloride flush, sodium chloride, ondansetron **OR** ondansetron, polyethylene glycol, oxyCODONE **OR** oxyCODONE, HYDROmorphone **OR** HYDROmorphone    Subjective:     Patient having difficulty urinating so zarate was placed overnight. Patient feeling well this morning. Pain well controlled with medication.     Objective:   Patient Vitals for the past 8 hrs:   BP Temp Temp src Pulse Resp SpO2   11/27/24 0758 109/64 97.5 °F (36.4 °C) Temporal 64 18 95 %       I/O last 3 completed shifts:  In: 430.5 [P.O.:120; I.V.:310.5]  Out: 1200 [Urine:1200]  I/O this shift:  In: 1436 [I.V.:1436]  Out: 300 [Urine:300]    No past medical history on file.    @homemeds@    Radiology:  CT 3D RECONSTRUCTION   Final Result   1. Acute, moderately displaced fracture of the left acetabulum.   2. Large vertical fracture line extending superiorly from the 
Ulrich removed DTV between 1171-1830  
Wound vac taken off patient. Wet to dry dressing applied to site. Wound vac taken to central supply.   
X ray at bedside.  
X ray called.  
Patient/Family education to increase follow through with safety techniques and functional independence  * Recommendation of environmental modifications for increased safety with functional transfers/mobility and ADLs  * Cognitive retraining/development of therapeutic activities to improve problem solving, judgement, memory, and attention for increased safety/participation in ADL/IADL tasks  * Therapeutic exercise to improve motor endurance, ROM, and functional strength for ADLs/functional transfers  * Therapeutic activities to facilitate/challenge dynamic balance, stand tolerance for increased safety and independence with ADLs  * Therapeutic activities to facilitate gross/fine motor skills for increased independence with ADLs  * Neuro-muscular re-education: facilitation of righting/equilibrium reactions, midline orientation, scapular stability/mobility, normalization of muscle tone, and facilitation of volitional active controled movement  * Positioning to improve skin integrity, interaction with environment and functional independence    Recommended Adaptive Equipment:  TBD     Home Living: Pt lives with wife (currently in [place]) in a 2 story home with 3 DESI and no hand rails    Bathroom setup: 1/2 bath on 1st floor;     Equipment owned: none    Prior Level of Function:  Independent  with ADLs , Independent  with IADLs; ambulated without AD   Driving: yes    Pain Level: Pt reports minimal L UE / LE pain this session; Therapist educated pt on NWB status; facilitated repositioning to address pain      Cognition: A&O: 4/4; Follows 2 step directions   Memory:  good   Sequencing:  good   Problem solving:  good   Judgement/safety:  fair     Functional Assessment:  AM-PAC Daily Activity Raw Score: 13/24   Initial Eval Status  Date: 11/29/24 Treatment Status  Date: STGs = LTGs  Time frame: 10-14 days   Feeding Mod I      Grooming Minimal Assist   Modified [place]    UB Dressing Moderate Assist   Modified 
documentation.     60 yo man with left open distal ulnar/radius fracture and L acetabulum/iliac wing fracture after fall from ladder      /63   Pulse 70   Temp 97.6 °F (36.4 °C) (Temporal)   Resp 17   Ht 1.702 m (5' 7.01\")   Wt 78 kg (172 lb)   SpO2 96%   BMI 26.93 kg/m²     Injuries/Active problems   ++Left open distal ulnar/radius fracture   ++Left acetabulum/iliac wing fracture  - S/P open reduction internal fixation of left acetabular fracture on 12/2/2024, xenograft application to left volar wrist wound on 12/2/2024, lateral collateral ligament repair of left elbow on 12/2/2024, open reduction total fixation of left distal radius and ulnar fracture on 11/27/2024  Ortho PLAN - pending am note      Nutrition- regular diet      I am managing IV/Enteral/PO Pain medications - yes      Bowel regimen: Glycolax and senna   DVT Ppx: SCD and Lovenox   GI Ppx: Not indicated      CBC:   Lab Results   Component Value Date/Time    WBC 8.9 12/07/2024 04:21 AM    RBC 3.25 12/07/2024 04:21 AM    HGB 9.5 12/07/2024 04:21 AM    HCT 28.8 12/07/2024 04:21 AM    MCV 88.6 12/07/2024 04:21 AM    MCH 29.2 12/07/2024 04:21 AM    MCHC 33.0 12/07/2024 04:21 AM    RDW 12.6 12/07/2024 04:21 AM     12/07/2024 04:21 AM    MPV 10.7 12/07/2024 04:21 AM     CMP:    Lab Results   Component Value Date/Time     12/07/2024 04:21 AM    K 4.0 12/07/2024 04:21 AM     12/07/2024 04:21 AM    CO2 24 12/07/2024 04:21 AM    BUN 20 12/07/2024 04:21 AM    CREATININE 0.8 12/07/2024 04:21 AM    LABGLOM >90 12/07/2024 04:21 AM    GLUCOSE 102 12/07/2024 04:21 AM    CALCIUM 8.9 12/07/2024 04:21 AM    BILITOT 0.6 11/26/2024 11:44 AM    ALKPHOS 84 11/26/2024 11:44 AM    AST 37 11/26/2024 11:44 AM    ALT 29 11/26/2024 11:44 AM       Imaging: No new imaging to review this morning      Jani Mahmood MD   Trauma/Critical care   
extremity, nonweightbearing to left lower extremity.  No PT OT evaluation or treatment for the left lower extremity this will be nonweightbearing and no range of motion until postoperative  Plan for ORIF of left acetabulum later this week versus early next week.   Tentative plan for repeat I&D of the left upper extremity tomorrow morning.  N.p.o. at midnight, treatment consent, Ancef on-call to or hold anticoagulation   24 hour abx coverage  Deep venous thrombosis prophylaxis -Per trauma, early mobilization  PT/OT  Pain Control: IV and PO  Monitor H&H  D/C Plan: Appreciate physical therapy and social work recommendations.      Electronically signed by Octavio Hayward DO on 11/30/2024 at 7:36 AM  Note: This report was completed using computerize voiced recognition software.  Every effort has been made to ensure accuracy; however, inadvertent computerized transcription errors may be present.     
functional mobility  [x]Decreased balance   [x]Decreased endurance   []Decreased posture  []Decreased sensation  []Decreased coordination   []Decreased vision  [x]Decreased safety awareness   [x]Increased pain       Comments:    Pt was in bed upon room entry; agreeable to PT evaluation. Pt completed all mobility noted above. NWB L UE and LE was reviewed. Plan is to have surgery on Monday for acetabular fx and pt wants to at least attempt to sit up at EOB. Pt was partially assisted to EOB when he started to complain of L hip and UE pain. Pt was assisted back to bed and positioned comfortably. Pt was left in bed with all needs met at conclusion of session.    Treatment:  Patient practiced and was instructed in the following treatment:    Therapeutic activities:  Bed mobility: Pt was cued for technique during bed mobility transfers.  Vitals and symptoms were closely monitored throughout session.  Skilled positioning in bed to protect skin/joint integrity.    Pt's/family goals:  1. To return home.    Prognosis is Fair for reaching above PT goals.    Patient and or family understand(s) diagnosis, prognosis, and plan of care.  Yes    PHYSICAL THERAPY PLAN OF CARE:    PT POC is established based on physician order and patient diagnosis     Referring provider/PT Order:    Start   Ordering Provider    11/26/24 1500  PT evaluation and treat  Start:  11/26/24 1500,   End:  11/26/24 1500,   ONE TIME,   Standing Count:  1 Occurrences,   R         Kimberlyn Rivas MD      Diagnosis:  Open fracture of distal end of ulna (alone) [S52.609B]  Specific instructions for next treatment:  Progress activity    Current Treatment Recommendations:     [x] Strengthening to improve independence with functional mobility   [] ROM to improve independence with functional mobility   [x] Balance Training to improve static/dynamic balance and to reduce fall risk  [x] Endurance Training to improve activity tolerance during functional mobility   [x] 
on energy conservation strategies, correct breathing pattern and techniques to improve independence/tolerance for self-care routine  * Functional transfer/mobility training/DME recommendations for increased independence, safety, and fall prevention  * Patient/Family education to increase follow through with safety techniques and functional independence  * Recommendation of environmental modifications for increased safety with functional transfers/mobility and ADLs  * Cognitive retraining/development of therapeutic activities to improve problem solving, judgement, memory, and attention for increased safety/participation in ADL/IADL tasks  * Therapeutic exercise to improve motor endurance, ROM, and functional strength for ADLs/functional transfers  * Therapeutic activities to facilitate/challenge dynamic balance, stand tolerance for increased safety and independence with ADLs  * Therapeutic activities to facilitate gross/fine motor skills for increased independence with ADLs  * Neuro-muscular re-education: facilitation of righting/equilibrium reactions, midline orientation, scapular stability/mobility, normalization of muscle tone, and facilitation of volitional active controled movement  * Positioning to improve skin integrity, interaction with environment and functional independence     Recommended Adaptive Equipment:  TBD      Home Living: Pt lives with wife (currently in [place]) in a 2 story home with 3 DESI and no hand rails    Bathroom setup: 1/2 bath on 1st floor;     Equipment owned: none     Prior Level of Function:  Independent  with ADLs , Independent  with IADLs; ambulated without AD   Driving: yes     Pain Level: Pt complained of LUE, LLE pain this session     Cognition: A&O: 4/4; Follows 2 step directions             Memory:  good             Sequencing:  good             Problem solving:  good             Judgement/safety:  fair               Functional Assessment:  AM-PAC Daily Activity Raw Score: 
13/24    Initial Eval Status  Date: 11/29/24 Treatment Status  Date: 12/4/24 STGs = LTGs  Time frame: 10-14 days   Feeding Mod I   [place]     Grooming Minimal Assist   [place]  Pt washed face, donned deodorant, assistance to R underarm seated EOB Modified [place]    UB Dressing Moderate Assist  maxA  [facility] gown seated EOB  Modified [place]    LB Dressing Dependent   Dependent  [facility] socks Moderate Assist    Bathing Maximal Assist maxA  Simulated Task    Assistance to wash of UB, total assist to wash of LB, buttocks and lemuel area  Moderate Assist    Toileting Dependent   Dependent  Pt having of zarate cathter, using of bed pan Moderate Assist    Bed Mobility  Partial supine<>sit: Maximal Assist x2 modA  Supine<>EOB    maxA  EOB<>Supine    HOB slightly elevated, use of bed rail with RUE Supine to sit: Moderate Assist   Sit to supine: Moderate Assist    Functional Transfers NT  maxA  Sit to Stand  Stand to Sit  *from EOB and Chair  Heavy max from chair due to being low surface    Stand Pivot Transfer EOB<>Chair  Chair<>EOB  *use of platform walker    Cueing for hand placement of RUE, assistance to maintain NWB LLE Moderate Assist    Functional Mobility NT  DNT      Balance Sitting: Max A  Standing: NT  Sitting EOB:  SBA    Standing:  maxA     Activity Tolerance P     Limited due to pain  Fair-  Motivated to engage in session    Monitoring of BP:  128/46 [id number]  112/71 -after standing  107/72 -in chair F+   Visual/  Perceptual wfl                          Hand Dominance right    Strength ROM Additional Info:    RUE  4/5 wfl good  and wfl FMC/dexterity noted during ADL tasks      LUE NWB splinted Able to wiggle fingers      Hearing: wfl  Sensation: denies numbness and tingling   Tone: wfl  Edema:none noted     Education:  Pt was educated on role of OT, goals to be reached, importance of OOB activity, precautions to follow, safety and hand placement of RUE with transfers,

## 2024-12-12 ENCOUNTER — TELEPHONE (OUTPATIENT)
Dept: FAMILY MEDICINE CLINIC | Age: 59
End: 2024-12-12

## 2024-12-18 DIAGNOSIS — S32.402D CLOSED DISPLACED FRACTURE OF LEFT ACETABULUM WITH ROUTINE HEALING, UNSPECIFIED PORTION OF ACETABULUM, SUBSEQUENT ENCOUNTER: ICD-10-CM

## 2024-12-18 DIAGNOSIS — S52.602E TYPE I OR II OPEN FRACTURE OF DISTAL END OF BOTH RADIUS AND ULNA OF LEFT UPPER EXTREMITY WITH ROUTINE HEALING, SUBSEQUENT ENCOUNTER: ICD-10-CM

## 2024-12-18 DIAGNOSIS — S53.105D DISLOCATION OF LEFT ELBOW, SUBSEQUENT ENCOUNTER: Primary | ICD-10-CM

## 2024-12-18 DIAGNOSIS — S52.502E TYPE I OR II OPEN FRACTURE OF DISTAL END OF BOTH RADIUS AND ULNA OF LEFT UPPER EXTREMITY WITH ROUTINE HEALING, SUBSEQUENT ENCOUNTER: ICD-10-CM

## 2024-12-20 ENCOUNTER — OFFICE VISIT (OUTPATIENT)
Dept: ORTHOPEDIC SURGERY | Age: 59
End: 2024-12-20
Payer: COMMERCIAL

## 2024-12-20 ENCOUNTER — HOSPITAL ENCOUNTER (OUTPATIENT)
Dept: GENERAL RADIOLOGY | Age: 59
Discharge: HOME | End: 2024-12-22
Payer: COMMERCIAL

## 2024-12-20 DIAGNOSIS — S32.462D CLOSED DISPLACED COMBINED TRANSVERSE-POSTERIOR FRACTURE OF LEFT ACETABULUM WITH ROUTINE HEALING, SUBSEQUENT ENCOUNTER: ICD-10-CM

## 2024-12-20 DIAGNOSIS — S53.105D DISLOCATION OF LEFT ELBOW, SUBSEQUENT ENCOUNTER: ICD-10-CM

## 2024-12-20 DIAGNOSIS — S52.602E TYPE I OR II OPEN FRACTURE OF DISTAL END OF BOTH RADIUS AND ULNA OF LEFT UPPER EXTREMITY WITH ROUTINE HEALING, SUBSEQUENT ENCOUNTER: ICD-10-CM

## 2024-12-20 DIAGNOSIS — S52.502E TYPE I OR II OPEN FRACTURE OF DISTAL END OF BOTH RADIUS AND ULNA OF LEFT UPPER EXTREMITY WITH ROUTINE HEALING, SUBSEQUENT ENCOUNTER: Primary | ICD-10-CM

## 2024-12-20 DIAGNOSIS — S32.402D CLOSED DISPLACED FRACTURE OF LEFT ACETABULUM WITH ROUTINE HEALING, UNSPECIFIED PORTION OF ACETABULUM, SUBSEQUENT ENCOUNTER: ICD-10-CM

## 2024-12-20 DIAGNOSIS — S52.602E TYPE I OR II OPEN FRACTURE OF DISTAL END OF BOTH RADIUS AND ULNA OF LEFT UPPER EXTREMITY WITH ROUTINE HEALING, SUBSEQUENT ENCOUNTER: Primary | ICD-10-CM

## 2024-12-20 DIAGNOSIS — S52.502E TYPE I OR II OPEN FRACTURE OF DISTAL END OF BOTH RADIUS AND ULNA OF LEFT UPPER EXTREMITY WITH ROUTINE HEALING, SUBSEQUENT ENCOUNTER: ICD-10-CM

## 2024-12-20 PROCEDURE — 73110 X-RAY EXAM OF WRIST: CPT

## 2024-12-20 PROCEDURE — L3809 WHFO W/O JOINTS PRE OTS: HCPCS | Performed by: STUDENT IN AN ORGANIZED HEALTH CARE EDUCATION/TRAINING PROGRAM

## 2024-12-20 PROCEDURE — 72190 X-RAY EXAM OF PELVIS: CPT

## 2024-12-20 PROCEDURE — 99024 POSTOP FOLLOW-UP VISIT: CPT | Performed by: STUDENT IN AN ORGANIZED HEALTH CARE EDUCATION/TRAINING PROGRAM

## 2024-12-20 PROCEDURE — 73080 X-RAY EXAM OF ELBOW: CPT

## 2024-12-20 PROCEDURE — 99213 OFFICE O/P EST LOW 20 MIN: CPT | Performed by: STUDENT IN AN ORGANIZED HEALTH CARE EDUCATION/TRAINING PROGRAM

## 2024-12-20 RX ORDER — OXYCODONE HYDROCHLORIDE 5 MG/1
5 CAPSULE ORAL EVERY 6 HOURS PRN
COMMUNITY

## 2024-12-20 RX ORDER — ACETAMINOPHEN 500 MG
500 TABLET ORAL EVERY 6 HOURS PRN
COMMUNITY

## 2024-12-20 RX ORDER — IBUPROFEN 600 MG/1
600 TABLET, FILM COATED ORAL EVERY 8 HOURS PRN
COMMUNITY

## 2024-12-20 RX ORDER — METHOCARBAMOL 500 MG/1
500 TABLET, FILM COATED ORAL 4 TIMES DAILY
COMMUNITY

## 2024-12-20 NOTE — PATIENT INSTRUCTIONS
Toe-touch weight-bearing to the left lower extremity  Nonweightbearing to the left upper extremity  Will remain in elbow ROM brace from  degrees  Will remain in left wrist Velcro brace  Adaptic, 4 x 4's, Ace wrap over wound on left wrist for 1 more week.  After 1 week, will transition to nonadherent dry dressings without Adaptic  Follow-up in 4 weeks for repeat radiographs and further evaluation

## 2024-12-20 NOTE — PROGRESS NOTES
with nylon suture, sutures removed at today's visit  Wound edges well-approximated over lateral elbow with nylon suture, sutures removed at today's visit  Patient able to flex/extend fingers and AB/adduct digits  Motor intact to AIN/PIN/median/ulnar/axillary nerve distributions  Elbow and wrist ROM not tested due to pain and recent surgery  Sensation somewhat diminished to median nerve distribution, but states that this is improving since his injury.  Sensation otherwise intact to radial and ulnar nerve distributions to the hand  2+ radial pulse.  Hand warm and well-perfused.  Capillary refill under 3 seconds  Left lower extremity:  Well-approximated incision over groin closed with subcutaneous suture.  No evidence of infection  Small incision over left hip closed with nylon.  Nylon suture removed  Hip ROM not tested due to pain.  Sensation intact to saphenous/sural/peroneal/tibial nerve distributions  Patient able to flex/extend toes and plantar/dorsiflex ankle.  2+ DP/PT pulse.  Foot warm and well-perfused.  There were no vitals taken for this visit.     XR: 12/20/24   Radiographs of the pelvis and left acetabulum demonstrate s/p ORIF left acetabulum associated both column acetabular fracture.  Hardware appears intact with no evidence of failure with appropriate fracture reduction that remains unchanged as compared to postoperative radiographs.  Radiographs of the left elbow demonstrate no fractures or dislocations.  There is a suture anchor in the lateral condyle.  Radiographs of the left wrist demonstrate s/p ORIF of a comminuted intra-articular distal radius fracture as well as a distal ulnar shaft fracture.  Hardware appears intact with no evidence of failure.  No interval changes in fracture alignment as compared to postoperative radiographs.    Reviewed prior testing:  Postoperative radiographs    ASSESSMENT:     Diagnosis Orders   1. Type I or II open fracture of distal end of both radius and ulna of left

## 2025-01-14 DIAGNOSIS — S52.602E TYPE I OR II OPEN FRACTURE OF DISTAL END OF BOTH RADIUS AND ULNA OF LEFT UPPER EXTREMITY WITH ROUTINE HEALING, SUBSEQUENT ENCOUNTER: Primary | ICD-10-CM

## 2025-01-14 DIAGNOSIS — S32.462D CLOSED DISPLACED COMBINED TRANSVERSE-POSTERIOR FRACTURE OF LEFT ACETABULUM WITH ROUTINE HEALING, SUBSEQUENT ENCOUNTER: ICD-10-CM

## 2025-01-14 DIAGNOSIS — T14.8XXA FRACTURE: ICD-10-CM

## 2025-01-14 DIAGNOSIS — S52.502E TYPE I OR II OPEN FRACTURE OF DISTAL END OF BOTH RADIUS AND ULNA OF LEFT UPPER EXTREMITY WITH ROUTINE HEALING, SUBSEQUENT ENCOUNTER: Primary | ICD-10-CM

## 2025-01-20 ENCOUNTER — HOSPITAL ENCOUNTER (OUTPATIENT)
Dept: GENERAL RADIOLOGY | Age: 60
Discharge: HOME | End: 2025-01-22
Payer: COMMERCIAL

## 2025-01-20 ENCOUNTER — OFFICE VISIT (OUTPATIENT)
Dept: ORTHOPEDIC SURGERY | Age: 60
End: 2025-01-20
Payer: COMMERCIAL

## 2025-01-20 VITALS
TEMPERATURE: 98.7 F | OXYGEN SATURATION: 97 % | DIASTOLIC BLOOD PRESSURE: 77 MMHG | SYSTOLIC BLOOD PRESSURE: 130 MMHG | HEART RATE: 73 BPM | RESPIRATION RATE: 16 BRPM

## 2025-01-20 DIAGNOSIS — S32.462D CLOSED DISPLACED COMBINED TRANSVERSE-POSTERIOR FRACTURE OF LEFT ACETABULUM WITH ROUTINE HEALING, SUBSEQUENT ENCOUNTER: ICD-10-CM

## 2025-01-20 DIAGNOSIS — S52.502E TYPE I OR II OPEN FRACTURE OF DISTAL END OF BOTH RADIUS AND ULNA OF LEFT UPPER EXTREMITY WITH ROUTINE HEALING, SUBSEQUENT ENCOUNTER: ICD-10-CM

## 2025-01-20 DIAGNOSIS — S53.105D DISLOCATION OF LEFT ELBOW, SUBSEQUENT ENCOUNTER: ICD-10-CM

## 2025-01-20 DIAGNOSIS — S52.602E TYPE I OR II OPEN FRACTURE OF DISTAL END OF BOTH RADIUS AND ULNA OF LEFT UPPER EXTREMITY WITH ROUTINE HEALING, SUBSEQUENT ENCOUNTER: ICD-10-CM

## 2025-01-20 DIAGNOSIS — S52.602E TYPE I OR II OPEN FRACTURE OF DISTAL END OF BOTH RADIUS AND ULNA OF LEFT UPPER EXTREMITY WITH ROUTINE HEALING, SUBSEQUENT ENCOUNTER: Primary | ICD-10-CM

## 2025-01-20 DIAGNOSIS — T14.8XXA FRACTURE: ICD-10-CM

## 2025-01-20 DIAGNOSIS — S52.502E TYPE I OR II OPEN FRACTURE OF DISTAL END OF BOTH RADIUS AND ULNA OF LEFT UPPER EXTREMITY WITH ROUTINE HEALING, SUBSEQUENT ENCOUNTER: Primary | ICD-10-CM

## 2025-01-20 PROCEDURE — 72190 X-RAY EXAM OF PELVIS: CPT

## 2025-01-20 PROCEDURE — 99213 OFFICE O/P EST LOW 20 MIN: CPT | Performed by: PHYSICIAN ASSISTANT

## 2025-01-20 PROCEDURE — 73070 X-RAY EXAM OF ELBOW: CPT

## 2025-01-20 PROCEDURE — 73110 X-RAY EXAM OF WRIST: CPT

## 2025-01-20 PROCEDURE — 99024 POSTOP FOLLOW-UP VISIT: CPT | Performed by: PHYSICIAN ASSISTANT

## 2025-01-20 RX ORDER — ENOXAPARIN SODIUM 300 MG/3ML
40 INJECTION INTRAVENOUS; SUBCUTANEOUS DAILY
COMMUNITY
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
## 2025-01-20 NOTE — PATIENT INSTRUCTIONS
INSTRUCTIONS FOR FACILITY      Weight Bearing: Non-weight bearing left upper and left lower extremity. Use assistive devices when needed.    Therapy: Continue PT/OT. Zhen for light strengthening L KITTY Fontaine to remove hinged elbow brace and wrist brace during therapy and for independent exercises. Start to advance hinged elbow brace  approximately 10 degrees per week, alternating between flexion and extension until the brace is fully opened.     Pain control: Per facility physician. Frequent ice, elevation, compression if able.    Incisional Care: 1 steri-strip removed today in office. Dry no-adherent dressings to volar R wrist wound changed daily. Continue to monitor for signs or symptoms of infection until skin has completely healed.     DVT Prophylaxis: Continue with Lovenox as prescribed     Follow up in 4 weeks    Call with any questions or concerns.   [phone]

## 2025-01-28 ENCOUNTER — TELEPHONE (OUTPATIENT)
Dept: FAMILY MEDICINE CLINIC | Age: 60
End: 2025-01-28

## 2025-01-28 NOTE — TELEPHONE ENCOUNTER
Akanksha from [facility] called asking if Dr Jorge will follow for home care ? PT and OT    Last seen 10/28/2024  Next appt Visit  10/31/2025    Akanksha [phone]

## 2025-01-31 ENCOUNTER — TELEPHONE (OUTPATIENT)
Dept: ORTHOPEDIC SURGERY | Age: 60
End: 2025-01-31

## 2025-01-31 RX ORDER — ASPIRIN 325 MG
325 TABLET ORAL 2 TIMES DAILY
Qty: 60 TABLET | Refills: 3 | Status: SHIPPED | OUTPATIENT
Start: 2025-01-31

## 2025-01-31 NOTE — TELEPHONE ENCOUNTER
[facility] called to verify order for DVT Prophylaxis on patient. Pt was d/c from facility without script. Spoke with Florian ZEPEDA and  BID sent to [facility] in [place] to provide DVT coverage.

## 2025-02-19 ENCOUNTER — OFFICE VISIT (OUTPATIENT)
Dept: ORTHOPEDIC SURGERY | Age: 60
End: 2025-02-19
Payer: COMMERCIAL

## 2025-02-19 ENCOUNTER — HOSPITAL ENCOUNTER (OUTPATIENT)
Dept: GENERAL RADIOLOGY | Age: 60
Discharge: HOME | End: 2025-02-21
Payer: COMMERCIAL

## 2025-02-19 VITALS
RESPIRATION RATE: 14 BRPM | HEART RATE: 71 BPM | OXYGEN SATURATION: 98 % | DIASTOLIC BLOOD PRESSURE: 86 MMHG | SYSTOLIC BLOOD PRESSURE: 139 MMHG

## 2025-02-19 DIAGNOSIS — S53.105D DISLOCATION OF LEFT ELBOW, SUBSEQUENT ENCOUNTER: ICD-10-CM

## 2025-02-19 DIAGNOSIS — S52.602E TYPE I OR II OPEN FRACTURE OF DISTAL END OF BOTH RADIUS AND ULNA OF LEFT UPPER EXTREMITY WITH ROUTINE HEALING, SUBSEQUENT ENCOUNTER: ICD-10-CM

## 2025-02-19 DIAGNOSIS — S52.502E TYPE I OR II OPEN FRACTURE OF DISTAL END OF BOTH RADIUS AND ULNA OF LEFT UPPER EXTREMITY WITH ROUTINE HEALING, SUBSEQUENT ENCOUNTER: Primary | ICD-10-CM

## 2025-02-19 DIAGNOSIS — S32.462D CLOSED DISPLACED COMBINED TRANSVERSE-POSTERIOR FRACTURE OF LEFT ACETABULUM WITH ROUTINE HEALING, SUBSEQUENT ENCOUNTER: ICD-10-CM

## 2025-02-19 DIAGNOSIS — M25.622 ELBOW STIFFNESS, LEFT: ICD-10-CM

## 2025-02-19 DIAGNOSIS — S52.502E TYPE I OR II OPEN FRACTURE OF DISTAL END OF BOTH RADIUS AND ULNA OF LEFT UPPER EXTREMITY WITH ROUTINE HEALING, SUBSEQUENT ENCOUNTER: ICD-10-CM

## 2025-02-19 DIAGNOSIS — S52.602E TYPE I OR II OPEN FRACTURE OF DISTAL END OF BOTH RADIUS AND ULNA OF LEFT UPPER EXTREMITY WITH ROUTINE HEALING, SUBSEQUENT ENCOUNTER: Primary | ICD-10-CM

## 2025-02-19 PROCEDURE — 73080 X-RAY EXAM OF ELBOW: CPT

## 2025-02-19 PROCEDURE — 99212 OFFICE O/P EST SF 10 MIN: CPT | Performed by: PHYSICIAN ASSISTANT

## 2025-02-19 PROCEDURE — 73110 X-RAY EXAM OF WRIST: CPT

## 2025-02-19 PROCEDURE — 72190 X-RAY EXAM OF PELVIS: CPT

## 2025-02-19 PROCEDURE — 99024 POSTOP FOLLOW-UP VISIT: CPT | Performed by: PHYSICIAN ASSISTANT

## 2025-02-19 NOTE — PROGRESS NOTES
Chief Complaint   Patient presents with    Post-Op Check     10wk PO L Acetabulum fx ORIF and Left radius and distal radius ORIF. Pt ambulating in wheelchair wit hinged elbow ROM. Pt states no pain.       Surgeon: Dr. Daigle  Date of procedure: 11/27/2024  Procedure: 1.  Irrigation and debridement including skin, subcutaneous tissue, muscle, and bone left grade 3A open intra-articular distal radius fracture     2.  Irrigation and debridement including skin, subtenons tissue, muscle, bone left grade 3 open distal ulnar shaft fracture     3.  Open reduction internal fixation intra-articular open distal radius fracture greater than 3 fragments     4.  Open reduction internal fixation left distal ulnar shaft fracture     5.  Fluoroscopic evaluation left elbow joint under anesthesia and treatment of left elbow dislocation closed     OP:SURGEON: Dr. Arthur Daigle MD  DATE OF PROCEDURE: 12-2-24  PROCEDURE: LEFT ACETABULUM OPEN REDUCTION INTERNAL FIXATION  Irrigation and debridement left forearm with skin grafting, aproximally 4.5 x 6 cm of skin, subcutaneous and myofascial tissue-preparation for skin allografting as well as placement of skin allograft  Left lateral collateral ligament and capsule reconstruction of the elbow-open treatment left elbow dislocation  Fluoroscopic examination of left elbow    OP:SURGEON: Dr Gunnar DO  DATE OF PROCEDURE: 12/10/24  PROCEDURE:Irrigation debridement including skin, subcutaneous tissue, muscle, fascia of 6 cm x 5 cm left volar distal forearm wound     POD: 11+ weeks    Subjective:  Issac Ramirez is following up from the above surgery. He is NWB on left upper and left lower extremity. He ambulates with assistive device, walker.  Pain to extremity is mild and is not taking prescribed pain medication. They denies numbness or tingling to the left upper and left lower extremity. Denies calf pain, chest pain, or shortness of breath.  Patient continues to use DVT prophylaxis, ASA

## 2025-02-19 NOTE — PATIENT INSTRUCTIONS
Start progressive weightbearing on the left lower extremity. Start with 25% of body weight for 7-10 days and if tolerating well, no significant increased pain or swelling ok to progress to 50% of body weight for 7-10 days. Then to 75% of body weight for 7-10 days, then to 100% on left lower extremity.      Partial weightbearing left upper extremity 2-3 pounds.  Okay to use left arm for guidance using assistive devices to ambulate as needed    Continue aspirin for DVT prophylaxis until fully weightbearing.    Continue to wear left hinged ROM elbow brace unlocked and left removable wrist brace when out for protection for 1-2 more weeks then can stop wearing both braces    Continue home therapy.    Patient will be set up for a left elbow Dynasplint    Calcium and vitamin D will be sent to your pharmacy    Follow-up in 6-8 weeks for reevaluation and x-rays.    Call if any questions or concerns

## 2025-02-24 ENCOUNTER — TELEPHONE (OUTPATIENT)
Dept: ORTHOPEDIC SURGERY | Age: 60
End: 2025-02-24

## 2025-02-24 NOTE — TELEPHONE ENCOUNTER
Letter printed  
Pt works as an  will be working from home for a time.  No Lifting or ambulating long distances.          Pt called stated that he needs a Return to Work letter dated for Thursday the 27th of this month.   
Patient/Caregiver provided printed discharge information.

## 2025-04-11 DIAGNOSIS — S53.105D DISLOCATION OF LEFT ELBOW, SUBSEQUENT ENCOUNTER: ICD-10-CM

## 2025-04-11 DIAGNOSIS — S52.602E TYPE I OR II OPEN FRACTURE OF DISTAL END OF BOTH RADIUS AND ULNA OF LEFT UPPER EXTREMITY WITH ROUTINE HEALING, SUBSEQUENT ENCOUNTER: Primary | ICD-10-CM

## 2025-04-11 DIAGNOSIS — S52.502E TYPE I OR II OPEN FRACTURE OF DISTAL END OF BOTH RADIUS AND ULNA OF LEFT UPPER EXTREMITY WITH ROUTINE HEALING, SUBSEQUENT ENCOUNTER: Primary | ICD-10-CM

## 2025-04-11 DIAGNOSIS — S32.462D CLOSED DISPLACED COMBINED TRANSVERSE-POSTERIOR FRACTURE OF LEFT ACETABULUM WITH ROUTINE HEALING, SUBSEQUENT ENCOUNTER: ICD-10-CM

## 2025-04-16 ENCOUNTER — OFFICE VISIT (OUTPATIENT)
Dept: ORTHOPEDIC SURGERY | Age: 60
End: 2025-04-16
Payer: COMMERCIAL

## 2025-04-16 ENCOUNTER — HOSPITAL ENCOUNTER (OUTPATIENT)
Dept: GENERAL RADIOLOGY | Age: 60
Discharge: HOME | End: 2025-04-18
Payer: COMMERCIAL

## 2025-04-16 VITALS
OXYGEN SATURATION: 97 % | RESPIRATION RATE: 20 BRPM | HEART RATE: 60 BPM | SYSTOLIC BLOOD PRESSURE: 143 MMHG | DIASTOLIC BLOOD PRESSURE: 85 MMHG | TEMPERATURE: 97.8 F

## 2025-04-16 DIAGNOSIS — S52.602E TYPE I OR II OPEN FRACTURE OF DISTAL END OF BOTH RADIUS AND ULNA OF LEFT UPPER EXTREMITY WITH ROUTINE HEALING, SUBSEQUENT ENCOUNTER: ICD-10-CM

## 2025-04-16 DIAGNOSIS — S53.105D DISLOCATION OF LEFT ELBOW, SUBSEQUENT ENCOUNTER: ICD-10-CM

## 2025-04-16 DIAGNOSIS — S32.462D CLOSED DISPLACED COMBINED TRANSVERSE-POSTERIOR FRACTURE OF LEFT ACETABULUM WITH ROUTINE HEALING, SUBSEQUENT ENCOUNTER: Primary | ICD-10-CM

## 2025-04-16 DIAGNOSIS — S52.502E TYPE I OR II OPEN FRACTURE OF DISTAL END OF BOTH RADIUS AND ULNA OF LEFT UPPER EXTREMITY WITH ROUTINE HEALING, SUBSEQUENT ENCOUNTER: ICD-10-CM

## 2025-04-16 DIAGNOSIS — S32.462D CLOSED DISPLACED COMBINED TRANSVERSE-POSTERIOR FRACTURE OF LEFT ACETABULUM WITH ROUTINE HEALING, SUBSEQUENT ENCOUNTER: ICD-10-CM

## 2025-04-16 PROCEDURE — 99212 OFFICE O/P EST SF 10 MIN: CPT | Performed by: ORTHOPAEDIC SURGERY

## 2025-04-16 PROCEDURE — 73070 X-RAY EXAM OF ELBOW: CPT

## 2025-04-16 PROCEDURE — 72190 X-RAY EXAM OF PELVIS: CPT

## 2025-04-16 PROCEDURE — 73110 X-RAY EXAM OF WRIST: CPT

## 2025-04-16 PROCEDURE — 99213 OFFICE O/P EST LOW 20 MIN: CPT | Performed by: ORTHOPAEDIC SURGERY
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
## 2025-04-16 NOTE — PATIENT INSTRUCTIONS
You were ordered CT of left wrist without contrast  by your Orthopedic Provider.  If you do not hear from that department please contact: CT- [phone]    Please make sure your follow up appointment in office is scheduled shortly after the above testing is complete.  If your testing is completed significantly sooner than planned follow up contact office for appointment adjustment.

## 2025-05-07 ENCOUNTER — TELEPHONE (OUTPATIENT)
Dept: ORTHOPEDIC SURGERY | Age: 60
End: 2025-05-07

## 2025-05-07 DIAGNOSIS — S52.602E TYPE I OR II OPEN FRACTURE OF DISTAL END OF BOTH RADIUS AND ULNA OF LEFT UPPER EXTREMITY WITH ROUTINE HEALING, SUBSEQUENT ENCOUNTER: Primary | ICD-10-CM

## 2025-05-07 DIAGNOSIS — S52.502E TYPE I OR II OPEN FRACTURE OF DISTAL END OF BOTH RADIUS AND ULNA OF LEFT UPPER EXTREMITY WITH ROUTINE HEALING, SUBSEQUENT ENCOUNTER: Primary | ICD-10-CM
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
## 2025-05-08 NOTE — TELEPHONE ENCOUNTER
CT ordered.    You were ordered CT of left wrist  by your Orthopedic Provider.  If you do not hear from that department please contact: CT- [phone]    Please make sure your follow up appointment in office is scheduled shortly after the above testing is complete.  If your testing is completed significantly sooner than planned follow up contact office for appointment adjustment.

## 2025-05-08 NOTE — TELEPHONE ENCOUNTER
Call placed to patient at this time. Left voicemail and provided patient with CT scheduling phone number. Instructed in message for patient to call our office once this is scheduled so that follow up appointment in office can be made.     Electronically signed by Jennifer Capps ATC on 5/8/2025 at 9:21 AM

## 2025-06-13 ENCOUNTER — TELEPHONE (OUTPATIENT)
Age: 60
End: 2025-06-13

## 2025-06-18 ENCOUNTER — OFFICE VISIT (OUTPATIENT)
Age: 60
End: 2025-06-18
Payer: COMMERCIAL

## 2025-06-18 VITALS
OXYGEN SATURATION: 97 % | DIASTOLIC BLOOD PRESSURE: 87 MMHG | RESPIRATION RATE: 20 BRPM | TEMPERATURE: 98.2 F | HEART RATE: 61 BPM | SYSTOLIC BLOOD PRESSURE: 149 MMHG

## 2025-06-18 DIAGNOSIS — S52.602E TYPE I OR II OPEN FRACTURE OF DISTAL END OF BOTH RADIUS AND ULNA OF LEFT UPPER EXTREMITY WITH ROUTINE HEALING, SUBSEQUENT ENCOUNTER: ICD-10-CM

## 2025-06-18 DIAGNOSIS — S52.502E TYPE I OR II OPEN FRACTURE OF DISTAL END OF BOTH RADIUS AND ULNA OF LEFT UPPER EXTREMITY WITH ROUTINE HEALING, SUBSEQUENT ENCOUNTER: ICD-10-CM

## 2025-06-18 DIAGNOSIS — S53.105D DISLOCATION OF LEFT ELBOW, SUBSEQUENT ENCOUNTER: ICD-10-CM

## 2025-06-18 DIAGNOSIS — M25.622 ELBOW STIFFNESS, LEFT: Primary | ICD-10-CM

## 2025-06-18 DIAGNOSIS — S32.462D CLOSED DISPLACED COMBINED TRANSVERSE-POSTERIOR FRACTURE OF LEFT ACETABULUM WITH ROUTINE HEALING, SUBSEQUENT ENCOUNTER: ICD-10-CM

## 2025-06-18 PROCEDURE — 99212 OFFICE O/P EST SF 10 MIN: CPT | Performed by: ORTHOPAEDIC SURGERY
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
## 2025-08-06 RX ORDER — ERGOCALCIFEROL 1.25 MG/1
50000 CAPSULE, LIQUID FILLED ORAL WEEKLY
Qty: 12 CAPSULE | Refills: 0 | Status: SHIPPED | OUTPATIENT
Start: 2025-08-06

## (undated) DEVICE — GOWN,AURORA,BRTHSLV,2XL,18/CS: Brand: MEDLINE

## (undated) DEVICE — DRESSING NEG PRSS SM 10X7.5X3.3CM POLYUR FOR WND THER VAC

## (undated) DEVICE — SHEARS ENDOSCP L9CM CRV HARM FOCS +

## (undated) DEVICE — BIT DRL L100MM DIA2MM ST QUIK CPL NONRADIOPAQUE W/O STP

## (undated) DEVICE — CANISTER NEG PRSS 1000ML W/ GEL INFOVAC

## (undated) DEVICE — SYRINGE,CONTROL,LL,FINGER,GRIP: Brand: MEDLINE INDUSTRIES, INC.

## (undated) DEVICE — HOOK RETRACT STERIL 12MM S STL BLNT E STAY LONE STAR

## (undated) DEVICE — BANDAGE,GAUZE,CONFORMING,4"X75",STRL,LF: Brand: MEDLINE

## (undated) DEVICE — SYRINGE MED 10ML TRNSLUC BRL PLUNG BLK MRK POLYPR CTRL

## (undated) DEVICE — ELECTRODE PT RET AD L9FT HI MOIST COND ADH HYDRGEL CORDED

## (undated) DEVICE — HEWSON SUTURE RETRIEVER: Brand: HEWSON SUTURE RETRIEVER

## (undated) DEVICE — NEPTUNE E-SEP SMOKE EVACUATION PENCIL, COATED, 70MM BLADE, PUSH BUTTON SWITCH: Brand: NEPTUNE E-SEP

## (undated) DEVICE — SET SURG BASIN MAYO REUSABLE

## (undated) DEVICE — COTTON STRIP: Brand: DEROYAL

## (undated) DEVICE — SELF-DRILLING HALF PIN: Brand: APEX

## (undated) DEVICE — HANDPIECE SET WITH BONE CLEANING TIP AND SUCTION TUBE: Brand: INTERPULSE

## (undated) DEVICE — DRILL BIT AO FITTING

## (undated) DEVICE — MAGNETIC INSTR DRAPE 20X16: Brand: MEDLINE INDUSTRIES, INC.

## (undated) DEVICE — KIT NEG PRSS SM W2.95XH1.26XL3.94IN BLK HYDROPHOBIC FOAM W/

## (undated) DEVICE — BIT DRL L110MM DIA1.8MM QUIK CPL CALIB W/O STP REUSE

## (undated) DEVICE — SUPPORT FACE L FOR 27IN EAR CHEEK CHIN E FOR FACIOPLASTY

## (undated) DEVICE — NEEDLE HYPO 25GA L1.5IN BLU POLYPR HUB S STL REG BVL STR

## (undated) DEVICE — Device

## (undated) DEVICE — BLADE,STAINLESS-STEEL,15,STRL,DISPOSABLE: Brand: MEDLINE

## (undated) DEVICE — PACK PROCEDURE SURG GEN CUST

## (undated) DEVICE — DRAPE EQUIP CARM 72X42 IN RUBBER BND CLP

## (undated) DEVICE — PADDING,UNDERCAST,COTTON, 4"X4YD STERILE: Brand: MEDLINE

## (undated) DEVICE — TOWEL,OR,DSP,ST,BLUE,DLX,10/PK,8PK/CS: Brand: MEDLINE

## (undated) DEVICE — CANISTER KIT W/ GEL VAC

## (undated) DEVICE — 4-PORT MANIFOLD: Brand: NEPTUNE 2

## (undated) DEVICE — KIT EVAC 400CC DIA1/8IN H PAT 12.5IN 3 SPR RND SHP PVC DRN

## (undated) DEVICE — PADDING,UNDERCAST,COTTON, 3X4YD STERILE: Brand: MEDLINE

## (undated) DEVICE — DRAPE,HAND,STERILE: Brand: MEDLINE

## (undated) DEVICE — GAUZE,SPONGE,4"X4",8PLY,STRL,LF,10/TRAY: Brand: MEDLINE

## (undated) DEVICE — GLOVE ORANGE PI 8 1/2   MSG9085

## (undated) DEVICE — LOWER EXT HIP DRAPE: Brand: MEDLINE INDUSTRIES, INC.

## (undated) DEVICE — STRAP POS MP 30X3 IN HK LOOP CLOSURE FOAM DISP

## (undated) DEVICE — DOUBLE BASIN SET: Brand: MEDLINE INDUSTRIES, INC.

## (undated) DEVICE — SPONGE,PEANUT,XRAY,ST,SM,3/8",5/CARD: Brand: MEDLINE INDUSTRIES, INC.

## (undated) DEVICE — ELECTRODE ELECSURG NDL 2.8 INX7.2 CM COAT INSUL EDGE

## (undated) DEVICE — TOWEL,OR,DSP,ST,BLUE,STD,6/PK,12PK/CS: Brand: MEDLINE

## (undated) DEVICE — ZIMMER® STERILE DISPOSABLE TOURNIQUET CUFF WITH PROTECTIVE SLEEVE AND PLC, DUAL PORT, SINGLE BLADDER, 18 IN. (46 CM)

## (undated) DEVICE — PADDING CAST W6INXL4YD COT LO LINTING WYTEX

## (undated) DEVICE — TRAP,MUCUS SPECIMEN,40CC: Brand: MEDLINE

## (undated) DEVICE — BNDG,ELSTC,MATRIX,STRL,3"X5YD,LF,HOOK&LP: Brand: MEDLINE

## (undated) DEVICE — 1000 S-DRAPE TOWEL DRAPE 10/BX: Brand: STERI-DRAPE™

## (undated) DEVICE — BANDAGE COMPR W4INXL10YD WHITE/BEIGE E MTRX HK LOOP CLSR

## (undated) DEVICE — SURGICAL PROCEDURE PACK EENT CUST

## (undated) DEVICE — CORD,CAUTERY,BIPOLAR,STERILE: Brand: MEDLINE

## (undated) DEVICE — UPPER EXTREMITY: Brand: MEDLINE INDUSTRIES, INC.

## (undated) DEVICE — 3M™ STERI-DRAPE™ INCISE DRAPE 1040 (35CM X 35CM): Brand: STERI-DRAPE™